# Patient Record
Sex: FEMALE | URBAN - METROPOLITAN AREA
[De-identification: names, ages, dates, MRNs, and addresses within clinical notes are randomized per-mention and may not be internally consistent; named-entity substitution may affect disease eponyms.]

---

## 2017-01-09 ENCOUNTER — HISTORICAL (OUTPATIENT)
Dept: ENDOSCOPY | Facility: HOSPITAL | Age: 62
End: 2017-01-09

## 2018-08-03 ENCOUNTER — HISTORICAL (OUTPATIENT)
Dept: CARDIOLOGY | Facility: HOSPITAL | Age: 63
End: 2018-08-03

## 2024-05-05 ENCOUNTER — HOSPITAL ENCOUNTER (INPATIENT)
Facility: HOSPITAL | Age: 69
LOS: 9 days | Discharge: HOME HEALTH | DRG: 236 | End: 2024-05-14
Attending: EMERGENCY MEDICINE | Admitting: INTERNAL MEDICINE
Payer: MEDICARE

## 2024-05-05 DIAGNOSIS — I25.10 ATHEROSCLEROSIS OF NATIVE CORONARY ARTERY OF NATIVE HEART WITHOUT ANGINA PECTORIS: Primary | ICD-10-CM

## 2024-05-05 DIAGNOSIS — I25.10 CAD (CORONARY ARTERY DISEASE): ICD-10-CM

## 2024-05-05 DIAGNOSIS — I20.0 UNSTABLE ANGINA: ICD-10-CM

## 2024-05-05 DIAGNOSIS — R07.9 CHEST PAIN: ICD-10-CM

## 2024-05-05 LAB
ALBUMIN SERPL-MCNC: 3.9 G/DL (ref 3.4–4.8)
ALBUMIN/GLOB SERPL: 1.3 RATIO (ref 1.1–2)
ALP SERPL-CCNC: 89 UNIT/L (ref 40–150)
ALT SERPL-CCNC: 22 UNIT/L (ref 0–55)
AST SERPL-CCNC: 28 UNIT/L (ref 5–34)
BASOPHILS # BLD AUTO: 0.03 X10(3)/MCL
BASOPHILS NFR BLD AUTO: 0.8 %
BILIRUB SERPL-MCNC: 0.7 MG/DL
BNP BLD-MCNC: 12.7 PG/ML
BUN SERPL-MCNC: 10.3 MG/DL (ref 9.8–20.1)
CALCIUM SERPL-MCNC: 10.1 MG/DL (ref 8.4–10.2)
CHLORIDE SERPL-SCNC: 110 MMOL/L (ref 98–107)
CHOLEST SERPL-MCNC: 120 MG/DL
CHOLEST/HDLC SERPL: 2 {RATIO} (ref 0–5)
CO2 SERPL-SCNC: 23 MMOL/L (ref 23–31)
CREAT SERPL-MCNC: 0.78 MG/DL (ref 0.55–1.02)
EOSINOPHIL # BLD AUTO: 0.07 X10(3)/MCL (ref 0–0.9)
EOSINOPHIL NFR BLD AUTO: 1.8 %
ERYTHROCYTE [DISTWIDTH] IN BLOOD BY AUTOMATED COUNT: 13 % (ref 11.5–17)
GFR SERPLBLD CREATININE-BSD FMLA CKD-EPI: >60 MLS/MIN/1.73/M2
GLOBULIN SER-MCNC: 3 GM/DL (ref 2.4–3.5)
GLUCOSE SERPL-MCNC: 93 MG/DL (ref 82–115)
HCT VFR BLD AUTO: 39.9 % (ref 37–47)
HDLC SERPL-MCNC: 49 MG/DL (ref 35–60)
HGB BLD-MCNC: 13.4 G/DL (ref 12–16)
IMM GRANULOCYTES # BLD AUTO: 0.01 X10(3)/MCL (ref 0–0.04)
IMM GRANULOCYTES NFR BLD AUTO: 0.3 %
LDLC SERPL CALC-MCNC: 60 MG/DL (ref 50–140)
LYMPHOCYTES # BLD AUTO: 1.47 X10(3)/MCL (ref 0.6–4.6)
LYMPHOCYTES NFR BLD AUTO: 37.1 %
MCH RBC QN AUTO: 29.8 PG (ref 27–31)
MCHC RBC AUTO-ENTMCNC: 33.6 G/DL (ref 33–36)
MCV RBC AUTO: 88.9 FL (ref 80–94)
MONOCYTES # BLD AUTO: 0.38 X10(3)/MCL (ref 0.1–1.3)
MONOCYTES NFR BLD AUTO: 9.6 %
NEUTROPHILS # BLD AUTO: 2 X10(3)/MCL (ref 2.1–9.2)
NEUTROPHILS NFR BLD AUTO: 50.4 %
NRBC BLD AUTO-RTO: 0 %
PLATELET # BLD AUTO: 165 X10(3)/MCL (ref 130–400)
PMV BLD AUTO: 10 FL (ref 7.4–10.4)
POTASSIUM SERPL-SCNC: 4.1 MMOL/L (ref 3.5–5.1)
PROT SERPL-MCNC: 6.9 GM/DL (ref 5.8–7.6)
RBC # BLD AUTO: 4.49 X10(6)/MCL (ref 4.2–5.4)
SODIUM SERPL-SCNC: 145 MMOL/L (ref 136–145)
TRIGL SERPL-MCNC: 54 MG/DL (ref 37–140)
TROPONIN I SERPL-MCNC: <0.01 NG/ML (ref 0–0.04)
TROPONIN I SERPL-MCNC: <0.01 NG/ML (ref 0–0.04)
VLDLC SERPL CALC-MCNC: 11 MG/DL
WBC # SPEC AUTO: 3.96 X10(3)/MCL (ref 4.5–11.5)

## 2024-05-05 PROCEDURE — P9045 ALBUMIN (HUMAN), 5%, 250 ML: HCPCS | Mod: JG

## 2024-05-05 PROCEDURE — 99285 EMERGENCY DEPT VISIT HI MDM: CPT | Mod: 25

## 2024-05-05 PROCEDURE — 11000001 HC ACUTE MED/SURG PRIVATE ROOM

## 2024-05-05 PROCEDURE — 21400001 HC TELEMETRY ROOM

## 2024-05-05 PROCEDURE — 80061 LIPID PANEL: CPT | Performed by: EMERGENCY MEDICINE

## 2024-05-05 PROCEDURE — 93010 ELECTROCARDIOGRAM REPORT: CPT | Mod: ,,, | Performed by: INTERNAL MEDICINE

## 2024-05-05 PROCEDURE — 25000003 PHARM REV CODE 250

## 2024-05-05 PROCEDURE — 85025 COMPLETE CBC W/AUTO DIFF WBC: CPT | Performed by: NURSE PRACTITIONER

## 2024-05-05 PROCEDURE — 80053 COMPREHEN METABOLIC PANEL: CPT | Performed by: NURSE PRACTITIONER

## 2024-05-05 PROCEDURE — 84484 ASSAY OF TROPONIN QUANT: CPT | Performed by: NURSE PRACTITIONER

## 2024-05-05 PROCEDURE — 93005 ELECTROCARDIOGRAM TRACING: CPT

## 2024-05-05 PROCEDURE — 84484 ASSAY OF TROPONIN QUANT: CPT | Performed by: EMERGENCY MEDICINE

## 2024-05-05 PROCEDURE — 63600175 PHARM REV CODE 636 W HCPCS: Mod: JG

## 2024-05-05 PROCEDURE — P9047 ALBUMIN (HUMAN), 25%, 50ML: HCPCS | Mod: JG

## 2024-05-05 PROCEDURE — 83880 ASSAY OF NATRIURETIC PEPTIDE: CPT | Performed by: NURSE PRACTITIONER

## 2024-05-05 RX ORDER — CLOPIDOGREL BISULFATE 75 MG/1
75 TABLET ORAL DAILY
COMMUNITY
Start: 2024-04-30

## 2024-05-05 RX ORDER — ASPIRIN 325 MG
TABLET ORAL
Status: COMPLETED
Start: 2024-05-05 | End: 2024-05-05

## 2024-05-05 RX ORDER — ROSUVASTATIN CALCIUM 40 MG/1
40 TABLET, COATED ORAL DAILY
COMMUNITY
Start: 2024-03-12

## 2024-05-05 RX ORDER — ASPIRIN 325 MG
325 TABLET, DELAYED RELEASE (ENTERIC COATED) ORAL ONCE
Status: DISCONTINUED | OUTPATIENT
Start: 2024-05-05 | End: 2024-05-10

## 2024-05-05 RX ORDER — ASPIRIN 325 MG
325 TABLET ORAL DAILY
Status: DISCONTINUED | OUTPATIENT
Start: 2024-05-05 | End: 2024-05-09

## 2024-05-05 RX ORDER — LISINOPRIL 10 MG/1
10 TABLET ORAL DAILY
Status: ON HOLD | COMMUNITY
Start: 2024-03-06 | End: 2024-05-14 | Stop reason: HOSPADM

## 2024-05-05 RX ORDER — NITROGLYCERIN 0.4 MG/1
0.4 TABLET SUBLINGUAL EVERY 5 MIN PRN
Status: DISCONTINUED | OUTPATIENT
Start: 2024-05-05 | End: 2024-05-14 | Stop reason: HOSPADM

## 2024-05-05 RX ORDER — SODIUM CHLORIDE 0.9 % (FLUSH) 0.9 %
10 SYRINGE (ML) INJECTION
Status: DISCONTINUED | OUTPATIENT
Start: 2024-05-05 | End: 2024-05-14 | Stop reason: HOSPADM

## 2024-05-05 RX ADMIN — ASPIRIN 325 MG ORAL TABLET 325 MG: 325 PILL ORAL at 01:05

## 2024-05-05 NOTE — ED PROVIDER NOTES
Encounter Date: 5/5/2024    SCRIBE #1 NOTE: I, Mio Holland, am scribing for, and in the presence of,  Dr. Davis. I have scribed the following portions of the note - the EKG reading. Other sections scribed: HPI, ROS, Physical Exam, MDM, Attending.       History     Chief Complaint   Patient presents with    Pleurisy     Pt. C/o left sided chest pain worse with standing and unable to stand up for long periods of time.. reports had echo done last week with abnormal results. Reports occasional sob      69 y/o female with history of HTN and HLD presents to ED for dull L-sided chest pain worse with exertion onset 5-7 days ago.  Pt states she had ECHO done with Dr. Doe last week and was told to return to his office after results came back abnormal.  She states she has not spoken with his office since then, but her symptoms continued, so she came here.  She also notes some SOB and fatigue with exertion and says she has to lie down after standing up for short periods of time.  She states the pain occasionally radiates down her L arm as well.  She had angiogram a few years ago and was told that she had 50% blockage.  Pt does not use EtOH or tobacco.  She denies rhinorrhea or congestion.    The history is provided by the patient.   Chest Pain  The current episode started several days ago. Chest pain occurs intermittently. The chest pain is currently at 0/10. The quality of the pain is described as dull. The pain radiates to the left arm. Chest pain is worsened by exertion. Primary symptoms include fatigue and shortness of breath.   Her past medical history is significant for hyperlipidemia and hypertension.   Procedure history is positive for echocardiogram.     Review of patient's allergies indicates:  No Known Allergies  No past medical history on file.  No past surgical history on file.  No family history on file.     Review of Systems   Constitutional:  Positive for fatigue.   HENT:  Negative for congestion and  rhinorrhea.    Respiratory:  Positive for shortness of breath.    Cardiovascular:  Positive for chest pain.       Physical Exam     Initial Vitals [05/05/24 1139]   BP Pulse Resp Temp SpO2   (!) 150/84 66 20 98.9 °F (37.2 °C) 100 %      MAP       --         Physical Exam    Nursing note and vitals reviewed.  Constitutional: No distress.   HENT:   Head: Normocephalic and atraumatic.   Neck: Trachea normal.   Cardiovascular:  Normal rate and regular rhythm.           No murmur heard.  Pulmonary/Chest: Breath sounds normal. No respiratory distress.   Abdominal: Abdomen is soft. Bowel sounds are normal. She exhibits no distension. There is no abdominal tenderness.   Musculoskeletal:         General: Normal range of motion.      Lumbar back: Normal range of motion.     Neurological: She is alert and oriented to person, place, and time. She has normal strength. No cranial nerve deficit.   Skin: Skin is warm and dry. No rash noted.   Psychiatric: She has a normal mood and affect. Judgment normal.         ED Course   Procedures  Labs Reviewed   COMPREHENSIVE METABOLIC PANEL - Abnormal; Notable for the following components:       Result Value    Chloride 110 (*)     All other components within normal limits   CBC WITH DIFFERENTIAL - Abnormal; Notable for the following components:    WBC 3.96 (*)     Neut # 2.00 (*)     All other components within normal limits   TROPONIN I - Normal   B-TYPE NATRIURETIC PEPTIDE - Normal   CBC W/ AUTO DIFFERENTIAL    Narrative:     The following orders were created for panel order CBC Auto Differential.  Procedure                               Abnormality         Status                     ---------                               -----------         ------                     CBC with Differential[[id number]]       Abnormal            Final result                 Please view results for these tests on the individual orders.   LIPID PANEL   TROPONIN I     EKG Readings: (Independently  Interpreted)   Initial Reading: No STEMI. Rhythm: Normal Sinus Rhythm. Heart Rate: 63. Ectopy: No Ectopy. Conduction: Normal. ST Segments: Normal ST Segments. T Waves: Normal. Axis: Normal.   1134       Imaging Results              X-Ray Chest PA And Lateral (In process)                      Medications   aspirin 325 MG tablet (has no administration in time range)   aspirin EC tablet 325 mg (has no administration in time range)   sodium chloride 0.9% flush 10 mL (has no administration in time range)   aspirin tablet 325 mg (has no administration in time range)   nitroGLYCERIN SL tablet 0.4 mg (has no administration in time range)     Medical Decision Making  Differential diagnoses include, but are not limited to: acute coronary syndrome, unstable angina    EKG without abnormality enzymes normal discussed case with Brandon with [facility] patient had a PET scan on Thursday which was abnormal instructing to admit and trend out    Problems Addressed:  Chest pain: complicated acute illness or injury    Amount and/or Complexity of Data Reviewed  Labs: ordered.  Radiology: ordered.  ECG/medicine tests: ordered.    Risk  OTC drugs.  Prescription drug management.  Decision regarding hospitalization.            Scribe Attestation:   Scribe #1: I performed the above scribed service and the documentation accurately describes the services I performed. I attest to the accuracy of the note.    Attending Attestation:           Physician Attestation for Scribe:  Physician Attestation Statement for Scribe #1: I, reviewed documentation, as scribed by Mio Holland in my presence, and it is both accurate and complete.             ED Course as of 05/05/24 1244   Sun May 05, 2024   1205 Paged [facility] [CY]      ED Course User Index  [CY] Mio Holland                           Clinical Impression:  Final diagnoses:  [R07.9] Chest pain  [I20.0] Unstable angina (Primary)          ED Disposition Condition    Admit Stable                Aubrey Davis  III, MD  05/05/24 1242

## 2024-05-05 NOTE — FIRST PROVIDER EVALUATION
Medical screening examination initiated.  I have conducted a focused provider triage encounter, findings are as follows:    Brief history of present illness:  67y/o F presents to the clinic with chest pain.  Recent echo done on Thursday.  Reports SOB    There were no vitals filed for this visit.    Pertinent physical exam:  AAA x 3    Brief workup plan:  Labs/EKG     Preliminary workup initiated; this workup will be continued and followed by the physician or advanced practice provider that is assigned to the patient when roomed.

## 2024-05-06 LAB
OHS QRS DURATION: 70 MS
OHS QTC CALCULATION: 360 MS

## 2024-05-06 PROCEDURE — 25500020 PHARM REV CODE 255: Performed by: STUDENT IN AN ORGANIZED HEALTH CARE EDUCATION/TRAINING PROGRAM

## 2024-05-06 PROCEDURE — 25000003 PHARM REV CODE 250: Performed by: NURSE PRACTITIONER

## 2024-05-06 PROCEDURE — 4A023N7 MEASUREMENT OF CARDIAC SAMPLING AND PRESSURE, LEFT HEART, PERCUTANEOUS APPROACH: ICD-10-PCS | Performed by: STUDENT IN AN ORGANIZED HEALTH CARE EDUCATION/TRAINING PROGRAM

## 2024-05-06 PROCEDURE — 25000003 PHARM REV CODE 250: Performed by: STUDENT IN AN ORGANIZED HEALTH CARE EDUCATION/TRAINING PROGRAM

## 2024-05-06 PROCEDURE — C1887 CATHETER, GUIDING: HCPCS | Performed by: STUDENT IN AN ORGANIZED HEALTH CARE EDUCATION/TRAINING PROGRAM

## 2024-05-06 PROCEDURE — B2111ZZ FLUOROSCOPY OF MULTIPLE CORONARY ARTERIES USING LOW OSMOLAR CONTRAST: ICD-10-PCS | Performed by: STUDENT IN AN ORGANIZED HEALTH CARE EDUCATION/TRAINING PROGRAM

## 2024-05-06 PROCEDURE — 93458 L HRT ARTERY/VENTRICLE ANGIO: CPT | Performed by: STUDENT IN AN ORGANIZED HEALTH CARE EDUCATION/TRAINING PROGRAM

## 2024-05-06 PROCEDURE — 99152 MOD SED SAME PHYS/QHP 5/>YRS: CPT | Performed by: STUDENT IN AN ORGANIZED HEALTH CARE EDUCATION/TRAINING PROGRAM

## 2024-05-06 PROCEDURE — 99153 MOD SED SAME PHYS/QHP EA: CPT | Performed by: STUDENT IN AN ORGANIZED HEALTH CARE EDUCATION/TRAINING PROGRAM

## 2024-05-06 PROCEDURE — 21400001 HC TELEMETRY ROOM

## 2024-05-06 PROCEDURE — 27201423 OPTIME MED/SURG SUP & DEVICES STERILE SUPPLY: Performed by: STUDENT IN AN ORGANIZED HEALTH CARE EDUCATION/TRAINING PROGRAM

## 2024-05-06 PROCEDURE — C1894 INTRO/SHEATH, NON-LASER: HCPCS | Performed by: STUDENT IN AN ORGANIZED HEALTH CARE EDUCATION/TRAINING PROGRAM

## 2024-05-06 PROCEDURE — 93799 UNLISTED CV SVC/PROCEDURE: CPT | Performed by: STUDENT IN AN ORGANIZED HEALTH CARE EDUCATION/TRAINING PROGRAM

## 2024-05-06 PROCEDURE — 27000221 HC OXYGEN, UP TO 24 HOURS

## 2024-05-06 PROCEDURE — 63600175 PHARM REV CODE 636 W HCPCS: Performed by: STUDENT IN AN ORGANIZED HEALTH CARE EDUCATION/TRAINING PROGRAM

## 2024-05-06 PROCEDURE — 25000003 PHARM REV CODE 250: Performed by: EMERGENCY MEDICINE

## 2024-05-06 PROCEDURE — C1769 GUIDE WIRE: HCPCS | Performed by: STUDENT IN AN ORGANIZED HEALTH CARE EDUCATION/TRAINING PROGRAM

## 2024-05-06 RX ORDER — CLOPIDOGREL BISULFATE 75 MG/1
75 TABLET ORAL DAILY
Status: DISCONTINUED | OUTPATIENT
Start: 2024-05-06 | End: 2024-05-06

## 2024-05-06 RX ORDER — FENTANYL CITRATE 50 UG/ML
INJECTION, SOLUTION INTRAMUSCULAR; INTRAVENOUS
Status: DISCONTINUED | OUTPATIENT
Start: 2024-05-06 | End: 2024-05-06 | Stop reason: HOSPADM

## 2024-05-06 RX ORDER — MIDAZOLAM HYDROCHLORIDE 1 MG/ML
INJECTION INTRAMUSCULAR; INTRAVENOUS
Status: DISCONTINUED | OUTPATIENT
Start: 2024-05-06 | End: 2024-05-06 | Stop reason: HOSPADM

## 2024-05-06 RX ORDER — SODIUM CHLORIDE 0.9 % (FLUSH) 0.9 %
10 SYRINGE (ML) INJECTION
Status: DISCONTINUED | OUTPATIENT
Start: 2024-05-06 | End: 2024-05-14 | Stop reason: HOSPADM

## 2024-05-06 RX ORDER — LIDOCAINE HYDROCHLORIDE 10 MG/ML
INJECTION INFILTRATION; PERINEURAL
Status: DISCONTINUED | OUTPATIENT
Start: 2024-05-06 | End: 2024-05-06 | Stop reason: HOSPADM

## 2024-05-06 RX ORDER — VERAPAMIL HYDROCHLORIDE 2.5 MG/ML
INJECTION, SOLUTION INTRAVENOUS
Status: DISCONTINUED | OUTPATIENT
Start: 2024-05-06 | End: 2024-05-06 | Stop reason: HOSPADM

## 2024-05-06 RX ORDER — HEPARIN SODIUM 1000 [USP'U]/ML
INJECTION, SOLUTION INTRAVENOUS; SUBCUTANEOUS
Status: DISCONTINUED | OUTPATIENT
Start: 2024-05-06 | End: 2024-05-06 | Stop reason: HOSPADM

## 2024-05-06 RX ORDER — CLOPIDOGREL BISULFATE 300 MG/1
600 TABLET, FILM COATED ORAL ONCE
Status: COMPLETED | OUTPATIENT
Start: 2024-05-06 | End: 2024-05-06

## 2024-05-06 RX ORDER — HYDROCODONE BITARTRATE AND ACETAMINOPHEN 5; 325 MG/1; MG/1
1 TABLET ORAL EVERY 4 HOURS PRN
Status: DISCONTINUED | OUTPATIENT
Start: 2024-05-06 | End: 2024-05-14 | Stop reason: HOSPADM

## 2024-05-06 RX ORDER — MORPHINE SULFATE 4 MG/ML
2 INJECTION, SOLUTION INTRAMUSCULAR; INTRAVENOUS EVERY 4 HOURS PRN
Status: DISCONTINUED | OUTPATIENT
Start: 2024-05-06 | End: 2024-05-14 | Stop reason: HOSPADM

## 2024-05-06 RX ORDER — ONDANSETRON HYDROCHLORIDE 2 MG/ML
4 INJECTION, SOLUTION INTRAVENOUS EVERY 8 HOURS PRN
Status: DISCONTINUED | OUTPATIENT
Start: 2024-05-06 | End: 2024-05-14 | Stop reason: HOSPADM

## 2024-05-06 RX ORDER — ACETAMINOPHEN 325 MG/1
650 TABLET ORAL EVERY 4 HOURS PRN
Status: DISCONTINUED | OUTPATIENT
Start: 2024-05-06 | End: 2024-05-14 | Stop reason: HOSPADM

## 2024-05-06 RX ORDER — HYDRALAZINE HYDROCHLORIDE 20 MG/ML
10 INJECTION INTRAMUSCULAR; INTRAVENOUS EVERY 4 HOURS PRN
Status: DISCONTINUED | OUTPATIENT
Start: 2024-05-06 | End: 2024-05-14 | Stop reason: HOSPADM

## 2024-05-06 RX ORDER — NITROGLYCERIN 20 MG/100ML
INJECTION INTRAVENOUS
Status: DISCONTINUED | OUTPATIENT
Start: 2024-05-06 | End: 2024-05-06 | Stop reason: HOSPADM

## 2024-05-06 RX ORDER — ATORVASTATIN CALCIUM 40 MG/1
80 TABLET, FILM COATED ORAL NIGHTLY
Status: DISCONTINUED | OUTPATIENT
Start: 2024-05-06 | End: 2024-05-14 | Stop reason: HOSPADM

## 2024-05-06 RX ADMIN — ATORVASTATIN CALCIUM 80 MG: 40 TABLET, FILM COATED ORAL at 08:05

## 2024-05-06 RX ADMIN — ASPIRIN 325 MG ORAL TABLET 325 MG: 325 PILL ORAL at 08:05

## 2024-05-06 RX ADMIN — HYDROCODONE BITARTRATE AND ACETAMINOPHEN 1 TABLET: 5; 325 TABLET ORAL at 06:05

## 2024-05-06 RX ADMIN — CLOPIDOGREL BISULFATE 600 MG: 300 TABLET, FILM COATED ORAL at 11:05

## 2024-05-06 NOTE — NURSING
Nurses Note -- 4 Eyes      5/5/2024   10:33 PM      Skin assessed during: Admit      [x] No Altered Skin Integrity Present    []Prevention Measures Documented      [] Yes- Altered Skin Integrity Present or Discovered   [] LDA Added if Not in Epic (Describe Wound)   [] New Altered Skin Integrity was Present on Admit and Documented in LDA   [] Wound Image Taken    Wound Care Consulted? No    Attending Nurse:  Audrey Vital RN/Staff Member:   Rosa Carlos RN

## 2024-05-06 NOTE — H&P
[facility] - 6th Floor Medical Telemetry    Cardiology  History and Physical     Patient Name: Tanvi Miranda  MRN: [id number]  Admission Date: 5/5/2024  Code Status: Full Code   Attending Provider: Quentin Peterson MD   Primary Care Physician: No, Primary Doctor  Principal Problem:<principal problem not specified>    Patient information was obtained from patient and ER records.     Subjective:     Chief Complaint: Chest Pain    HPI: 68-year-old female that is known to Dr. Doe with a PMHx of HLD, HTN, GERD and family Hx of early CAD. She presented to [facility] ED on 5.5.24 with complaints of left sided chest pain. Patient recently had a VAN/PET and ECHO completed by Dr. Doe and was planned to follow up 5.9.24 for results. She reports associated SOB and fatugue with exertion. She also report radiating left arm pain. Her last [facility] was back in 2018. ED Course: HR 66, /84, RR 20, Temp 98.9F SpO2 100% on RA. Lab work showed: WBC 3.98, K 4.1, BUN/Cr 10.3/0.78, Ca 10.1, AST/ALT 28/23, BNP 12.7, Trop: <0.010 X2, EKG shows no acute ST changes. [facility] will admit for chest pain.       PMH: HLD, HTN, GERD and family Hx of early CAD  PSH: Colonoscopy, tonsillectomy, Hysterectomy   Family History: Mother: CAD s/p PCI, Alzheimers, PAD, Brother & Sister: CAD  Social History: Denies Tobacco, illicit drug, or ETOH use.     Previous Cardiac Diagnostics:   Van PET (5.2.24):  The left ventricular cavity is noted to be normal on the stress studies. The stress left ventricular ejection fraction was calculated to be 63% and left ventricular global function is normal. The rest left ventricular cavity is noted to be normal. The rest left ventricular ejection fraction was calculated to be 50% and rest left ventricular global function is normal.   When compared to the resting ejection fraction (50%), the stress ejection fraction (63%) has increased.   There was a rise in myocardial blood flow between rest and stress.  Global  myocardial blood flow reserve was 2.88.  Myocardial blood flow reserve is reduced in the inferior territory which is suggestive of flow limiting stenosis in this territory.  *FINAL READ NOT DONE*    ECHO (5.2.24):  The study quality is average.   The left ventricle is normal in size. Global left ventricular systolic function is normal. The left ventricular ejection fraction is 60%.   Normal appearing valvular structures and function are within study limits.   The pulmonary artery systolic pressure is 10 mmHg. The pulmonary artery appears to be normal.    LHC ( 8.3.18):  LAD: arrises right from the aorta there is no LM. Significant calcification present in LAD. There is a 40-50% stenosis in the midpoint of the LAD. D2 is moderate in size with 20-30% stenosis   RCA: large prominent vessel. It is dominant. 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, Shows a long tubular  50% stenosis proximally with heavy calcification,  No obstructive disease was found   PLAN: Medical management       Review of patient's allergies indicates:  No Known Allergies    Current Facility-Administered Medications   Medication Dose Route Frequency Provider Last Rate Last Admin    aspirin EC tablet 325 mg  325 mg Oral Once Aubrey Davis III, MD        aspirin tablet 325 mg  325 mg Oral Daily Aubrey Davis III, MD   325 mg at 05/06/24 0812    nitroGLYCERIN SL tablet 0.4 mg  0.4 mg Sublingual Q5 Min PRN Aubrey Davis III, MD        sodium chloride 0.9% flush 10 mL  10 mL Intravenous PRN Aubrey Davis III, MD         Family History    None       Tobacco Use    Smoking status: Never    Smokeless tobacco: Never   Substance and Sexual Activity    Alcohol use: Not on file    Drug use: Not on file    Sexual activity: Not on file     Review of Systems   Constitutional: Negative.   HENT: Negative.     Eyes:  Negative.    Cardiovascular:  Positive for chest pain. Negative for palpitations and syncope.   Respiratory:  Negative for cough and shortness of breath.    Skin: Negative.    Musculoskeletal: Negative.    Gastrointestinal:  Negative for abdominal pain, nausea and vomiting.   Genitourinary: Negative.    Neurological: Negative.      Objective:     Vital Signs (Most Recent):  Temp: 98 °F (36.7 °C) (05/06/24 0450)  Pulse: 64 (05/06/24 0450)  Resp: 17 (05/06/24 0450)  BP: 138/79 (05/06/24 0450)  SpO2: 99 % (05/06/24 0450) Vital Signs (24h Range):  Temp:  [97.9 °F (36.6 °C)-98.9 °F (37.2 °C)] 98 °F (36.7 °C)  Pulse:  [53-73] 64  Resp:  [15-25] 17  SpO2:  [98 %-100 %] 99 %  BP: (124-158)/(75-91) 138/79     Weight: 78.7 kg (173 lb 8 oz)  Body mass index is 30.73 kg/m².    SpO2: 99 %       No intake or output data in the 24 hours ending 05/06/24 0844    Lines/Drains/Airways       Peripheral Intravenous Line  Duration                  Peripheral IV - Single Lumen 05/05/24 1201 20 G Anterior;Proximal;Right Forearm <1 day                    Physical Exam  Constitutional:       General: She is not in acute distress.     Appearance: Normal appearance.   HENT:      Head: Normocephalic.      Nose: Nose normal.      Mouth/Throat:      Mouth: Mucous membranes are moist.   Eyes:      Pupils: Pupils are equal, round, and reactive to light.   Cardiovascular:      Rate and Rhythm: Normal rate and regular rhythm.      Pulses: Normal pulses.      Heart sounds: Normal heart sounds. No murmur heard.  Pulmonary:      Effort: Pulmonary effort is normal. No respiratory distress.      Breath sounds: Normal breath sounds.   Abdominal:      General: Abdomen is flat. There is no distension.      Palpations: Abdomen is soft.   Skin:     General: Skin is warm.   Neurological:      General: No focal deficit present.      Mental Status: She is alert and oriented to person, place, and time.   Psychiatric:         Mood and Affect: Mood normal.          Behavior: Behavior normal.         Judgment: Judgment normal.         EKG:       Telemetry: SR    Significant Labs: BMP:   Recent Labs   Lab 05/05/24  1156      K 4.1   CO2 23   BUN 10.3   CREATININE 0.78   CALCIUM 10.1   , CMP   Recent Labs   Lab 05/05/24  1156      K 4.1   CO2 23   BUN 10.3   CREATININE 0.78   CALCIUM 10.1   ALBUMIN 3.9   BILITOT 0.7   ALKPHOS 89   AST 28   ALT 22   , CBC   Recent Labs   Lab 05/05/24  1156   WBC 3.96*   HGB 13.4   HCT 39.9      , Lipid Panel   Recent Labs   Lab 05/05/24  1256   CHOL 120   HDL 49   TRIG 54   , and Troponin   Recent Labs   Lab 05/05/24  1156 05/05/24  1256   TROPONINI <0.010 <0.010       Significant Imaging:  Results for orders placed or performed during the hospital encounter of 05/05/24   X-Ray Chest PA And Lateral    Narrative    EXAMINATION:  XR CHEST PA AND LATERAL    CLINICAL HISTORY:  Chest pain, unspecified    TECHNIQUE:  Two views of the chest    COMPARISON:  05/02/2013    FINDINGS:  LINES AND TUBES: EKG/telemetry leads overlie the chest.    MEDIASTINUM AND CUBA: The cardiac silhouette is normal.    LUNGS: No lobar consolidation. No edema.    PLEURA:No pleural effusion. No pneumothorax.    BONES: No acute osseous abnormality.      Impression    No acute cardiopulmonary abnormality.      Electronically signed by: Julia Schaefer  Date:    05/05/2024  Time:    12:59     Assessment and Plan:   Unstable Angina    - Trop: <0.010 X2   - EKG: shows no acute ST changes  CAD   -  PET (5.2.24): concern for Anterior ischemia ~ Dr. Zapata reviewed   HLD  HTN  GERD  No Hx of GI bleed  No known allergies       PLAN:  EKG reviewed  Recent ECHO reviewed   VAN/PET ~ reviewed by Dr. Zapata  Loaded with ASA  Load was Plavix 600mg    BB noted started due to bradycardia   Restart Home Plavix & Statin  Plan for [facility] today   Keep NPO until after procedure   Risk and benefits discussed and informed consent was obtained ~ placed on chart  AM Labs: CBC,  CMP, Mag        VTE Risk Mitigation (From admission, onward)           Ordered     IP VTE HIGH RISK PATIENT  Once         05/05/24 1244     Place sequential compression device  Until discontinued         05/05/24 1244                    Danuta Cheek NP  Cardiology  [facility] - 6th Floor Medical Telemetry  05/06/2024 8:44 AM    Physician addendum:  I have seen and examined this patient as a split-shared visit with the EMILY d/t complicated medical management of above problems written in assessment and high acuity requiring physician expertise in medical decision-making. I performed the substantive portion of the history and exam. Above medical decision-making is also formulated by me.  Stress test reviewed appears to have significant inferior ischemia    Cardiovascular exam:  S1, S2  Lungs:  Clear to auscultation  Extremities:  No edema, 2 + pulses    Plan:  Risks, benefits, alternatives of left heart catheterization plus or minus intervention discussed in detail with patient.  Specific risks include but are not limited to stroke, death, heart attack, need for emergent surgery, bleeding, renal failure.  Patient voiced understanding and wishes to proceed.  Patient on aspirin  Will load with plavix, suspect single vessel disease in RCA territory by recent stress test results    Daniel Zapata MD  Cardiologist

## 2024-05-07 LAB
ALBUMIN SERPL-MCNC: 3.7 G/DL (ref 3.4–4.8)
ALBUMIN/GLOB SERPL: 1.5 RATIO (ref 1.1–2)
ALP SERPL-CCNC: 82 UNIT/L (ref 40–150)
ALT SERPL-CCNC: 17 UNIT/L (ref 0–55)
AST SERPL-CCNC: 21 UNIT/L (ref 5–34)
BASOPHILS # BLD AUTO: 0.05 X10(3)/MCL
BASOPHILS NFR BLD AUTO: 1.1 %
BILIRUB SERPL-MCNC: 0.9 MG/DL
BUN SERPL-MCNC: 11.8 MG/DL (ref 9.8–20.1)
CALCIUM SERPL-MCNC: 10 MG/DL (ref 8.4–10.2)
CHLORIDE SERPL-SCNC: 109 MMOL/L (ref 98–107)
CO2 SERPL-SCNC: 26 MMOL/L (ref 23–31)
CREAT SERPL-MCNC: 0.78 MG/DL (ref 0.55–1.02)
EOSINOPHIL # BLD AUTO: 0.14 X10(3)/MCL (ref 0–0.9)
EOSINOPHIL NFR BLD AUTO: 3.1 %
ERYTHROCYTE [DISTWIDTH] IN BLOOD BY AUTOMATED COUNT: 13.1 % (ref 11.5–17)
GFR SERPLBLD CREATININE-BSD FMLA CKD-EPI: >60 MLS/MIN/1.73/M2
GLOBULIN SER-MCNC: 2.4 GM/DL (ref 2.4–3.5)
GLUCOSE SERPL-MCNC: 99 MG/DL (ref 82–115)
HCT VFR BLD AUTO: 42 % (ref 37–47)
HGB BLD-MCNC: 13.7 G/DL (ref 12–16)
IMM GRANULOCYTES # BLD AUTO: 0.01 X10(3)/MCL (ref 0–0.04)
IMM GRANULOCYTES NFR BLD AUTO: 0.2 %
LYMPHOCYTES # BLD AUTO: 1.59 X10(3)/MCL (ref 0.6–4.6)
LYMPHOCYTES NFR BLD AUTO: 35 %
MAGNESIUM SERPL-MCNC: 2.1 MG/DL (ref 1.6–2.6)
MCH RBC QN AUTO: 29.9 PG (ref 27–31)
MCHC RBC AUTO-ENTMCNC: 32.6 G/DL (ref 33–36)
MCV RBC AUTO: 91.7 FL (ref 80–94)
MONOCYTES # BLD AUTO: 0.44 X10(3)/MCL (ref 0.1–1.3)
MONOCYTES NFR BLD AUTO: 9.7 %
NEUTROPHILS # BLD AUTO: 2.31 X10(3)/MCL (ref 2.1–9.2)
NEUTROPHILS NFR BLD AUTO: 50.9 %
NRBC BLD AUTO-RTO: 0 %
PLATELET # BLD AUTO: 161 X10(3)/MCL (ref 130–400)
PMV BLD AUTO: 10.1 FL (ref 7.4–10.4)
POTASSIUM SERPL-SCNC: 3.9 MMOL/L (ref 3.5–5.1)
PROT SERPL-MCNC: 6.1 GM/DL (ref 5.8–7.6)
RBC # BLD AUTO: 4.58 X10(6)/MCL (ref 4.2–5.4)
SODIUM SERPL-SCNC: 140 MMOL/L (ref 136–145)
WBC # SPEC AUTO: 4.54 X10(3)/MCL (ref 4.5–11.5)

## 2024-05-07 PROCEDURE — 85025 COMPLETE CBC W/AUTO DIFF WBC: CPT | Performed by: NURSE PRACTITIONER

## 2024-05-07 PROCEDURE — 25000003 PHARM REV CODE 250: Performed by: STUDENT IN AN ORGANIZED HEALTH CARE EDUCATION/TRAINING PROGRAM

## 2024-05-07 PROCEDURE — 25000003 PHARM REV CODE 250: Performed by: EMERGENCY MEDICINE

## 2024-05-07 PROCEDURE — 99223 1ST HOSP IP/OBS HIGH 75: CPT | Mod: ,,,

## 2024-05-07 PROCEDURE — 83735 ASSAY OF MAGNESIUM: CPT | Performed by: NURSE PRACTITIONER

## 2024-05-07 PROCEDURE — 21400001 HC TELEMETRY ROOM

## 2024-05-07 PROCEDURE — 80053 COMPREHEN METABOLIC PANEL: CPT | Performed by: NURSE PRACTITIONER

## 2024-05-07 PROCEDURE — 25000003 PHARM REV CODE 250: Performed by: NURSE PRACTITIONER

## 2024-05-07 PROCEDURE — 36415 COLL VENOUS BLD VENIPUNCTURE: CPT | Performed by: NURSE PRACTITIONER

## 2024-05-07 RX ADMIN — ASPIRIN 325 MG ORAL TABLET 325 MG: 325 PILL ORAL at 09:05

## 2024-05-07 RX ADMIN — HYDROCODONE BITARTRATE AND ACETAMINOPHEN 1 TABLET: 5; 325 TABLET ORAL at 08:05

## 2024-05-07 RX ADMIN — ATORVASTATIN CALCIUM 80 MG: 40 TABLET, FILM COATED ORAL at 07:05

## 2024-05-07 RX ADMIN — ACETAMINOPHEN 650 MG: 325 TABLET, FILM COATED ORAL at 12:05

## 2024-05-07 NOTE — PLAN OF CARE
05/07/24 0851   Discharge Assessment   Assessment Type Discharge Planning Assessment   Confirmed/corrected address, phone number and insurance Yes   Source of Information patient   When was your last doctors appointment?   (PCP is Dr. Veto Rodriguez in [place].)   Reason For Admission Chest Pain   People in Home spouse   Do you expect to return to your current living situation? Yes   Do you have help at home or someone to help you manage your care at home? Yes   Who are your caregiver(s) and their phone number(s)? Vidhya/Sister/[phone]   Current cognitive status: Alert/Oriented   Walking or Climbing Stairs Difficulty no   Dressing/Bathing Difficulty no   Home Accessibility stairs to enter home   Number of Stairs, Main Entrance five   Home Layout Able to live on 1st floor   Equipment Currently Used at Home none   Readmission within 30 days? No   Do you currently have service(s) that help you manage your care at home? No   Do you take prescription medications? Yes   Do you have prescription coverage? Yes   Do you have any problems affording any of your prescribed medications? No   Who is going to help you get home at discharge? Sister   How do you get to doctors appointments? car, drives self   Are you on dialysis? No   Do you take coumadin? No   Discharge Plan A Home with family   Discharge Plan B Home Health   Discharge Plan discussed with: Patient   Housing Stability   In the last 12 months, was there a time when you were not able to pay the mortgage or rent on time? N   At any time in the past 12 months, were you homeless or living in a shelter (including now)? N   Transportation Needs   Has the lack of transportation kept you from medical appointments, meetings, work or from getting things needed for daily living? No   Food Insecurity   Within the past 12 months, you worried that your food would run out before you got the money to buy more. Never true   Within the past 12 months, the food you bought just  didn't last and you didn't have money to get more. Never true

## 2024-05-07 NOTE — PROGRESS NOTES
"[facility] - 6th Floor Medical Telemetry    Cardiology  Progress Note    Patient Name: Tanvi Miranda  MRN: [id number]  Admission Date: 5/5/2024  Hospital Length of Stay: 2 days  Code Status: Full Code   Attending Physician: Quentin Peterson MD   Primary Care Physician: Rupinder, Primary Doctor  Expected Discharge Date:   Principal Problem:<principal problem not specified>    Subjective:     Brief HPI/Hospital Course: 68-year-old female that is known to Dr. Doe with a PMHx of HLD, HTN, GERD and family Hx of early CAD. She presented to [facility] ED on 5.5.24 with complaints of left sided chest pain. Patient recently had a VAN/PET and ECHO completed by Dr. Doe and was planned to follow up 5.9.24 for results. She reports associated SOB and fatugue with exertion. She also report radiating left arm pain. Her last [facility] was back in 2018. ED Course: HR 66, /84, RR 20, Temp 98.9F SpO2 100% on RA. Lab work showed: WBC 3.98, K 4.1, BUN/Cr 10.3/0.78, Ca 10.1, AST/ALT 28/23, BNP 12.7, Trop: <0.010 X2, EKG shows no acute ST changes. [facility] will admit for chest pain.      5.7.24:  NAD. VSS. No complaints of CP/SOB/Palps. "I feel okay"    PMH: HLD, HTN, GERD and family Hx of early CAD  PSH: Colonoscopy, tonsillectomy, Hysterectomy   Family History: Mother: CAD s/p PCI, Alzheimers, PAD, Brother & Sister: CAD  Social History: Denies Tobacco, illicit drug, or ETOH use.      Previous Cardiac Diagnostics:   [facility] (5.6.24):  There is significant coronary artery disease. Mid Left Anterior Descending has a 60-70% stenosis. Instantaneous wave-free ratio (iFR) was performed on the lesion, and found to be non-hemodynamically significant at 0.96. 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 Ostial Left Circumflex has a 95% stenosis. The distal vessel is supplied via left-to-right collaterals from the LAD (septal perforating branches). Prox Right Coronary Artery has a calcified 90% stenosis. The vessel is moderately " tortuous. The distal vessel is supplied via left-to-right collaterals from the LAD (septal perforating branches). LVEDP is normal at 13 mmHg.    [facility] PET (5.2.24):  The left ventricular cavity is noted to be normal on the stress studies. The stress left ventricular ejection fraction was calculated to be 63% and left ventricular global function is normal. The rest left ventricular cavity is noted to be normal. The rest left ventricular ejection fraction was calculated to be 50% and rest left ventricular global function is normal.   When compared to the resting ejection fraction (50%), the stress ejection fraction (63%) has increased.   There was a rise in myocardial blood flow between rest and stress.  Global myocardial blood flow reserve was 2.88.  Myocardial blood flow reserve is reduced in the inferior territory which is suggestive of flow limiting stenosis in this territory.  *FINAL READ NOT DONE*     ECHO (5.2.24):  The study quality is average.   The left ventricle is normal in size. Global left ventricular systolic function is normal. The left ventricular ejection fraction is 60%.   Normal appearing valvular structures and function are within study limits.   The pulmonary artery systolic pressure is 10 mmHg. The pulmonary artery appears to be normal.     LHC ( 8.3.18):  LAD: arrises right from the aorta there is no LM. Significant calcification present in LAD. There is a 40-50% stenosis in the midpoint of the LAD. D2 is moderate in size with 20-30% stenosis   RCA: large prominent vessel. It is dominant. 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, Shows a long tubular  50% stenosis proximally with heavy calcification,  No obstructive disease was found   PLAN: Medical management     Review of Systems   Cardiovascular:  Negative for chest pain, dyspnea on exertion, leg swelling and  palpitations.   Respiratory:  Negative for shortness of breath.    All other systems reviewed and are negative.    Objective:     Vital Signs (Most Recent):  Temp: 98.5 °F (36.9 °C) (05/07/24 0738)  Pulse: 70 (05/07/24 0738)  Resp: 15 (05/07/24 0738)  BP: 135/78 (05/07/24 0738)  SpO2: 99 % (05/07/24 0738) Vital Signs (24h Range):  Temp:  [97.6 °F (36.4 °C)-98.5 °F (36.9 °C)] 98.5 °F (36.9 °C)  Pulse:  [54-70] 70  Resp:  [15-18] 15  SpO2:  [95 %-100 %] 99 %  BP: (121-151)/(62-81) 135/78   Weight: 78.7 kg (173 lb 8 oz)  Body mass index is 30.73 kg/m².  SpO2: 99 %       Intake/Output Summary (Last 24 hours) at 5/7/2024 0901  Last data filed at 5/6/2024 2158  Gross per 24 hour   Intake 702 ml   Output --   Net 702 ml     Lines/Drains/Airways       Peripheral Intravenous Line  Duration                  Peripheral IV - Single Lumen 05/05/24 1201 20 G Anterior;Proximal;Right Forearm 1 day                  Significant Labs:   Recent Results (from the past 72 hour(s))   EKG 12-lead    Collection Time: 05/05/24 11:34 AM   Result Value Ref Range    QRS Duration 70 ms    [facility] QTC Calculation 360 ms   Comprehensive metabolic panel    Collection Time: 05/05/24 11:56 AM   Result Value Ref Range    Sodium Level 145 136 - 145 mmol/L    Potassium Level 4.1 3.5 - 5.1 mmol/L    Chloride 110 (H) 98 - 107 mmol/L    Carbon Dioxide 23 23 - 31 mmol/L    Glucose Level 93 82 - 115 mg/dL    Blood Urea Nitrogen 10.3 9.8 - 20.1 mg/dL    Creatinine 0.78 0.55 - 1.02 mg/dL    Calcium Level Total 10.1 8.4 - 10.2 mg/dL    Protein Total 6.9 5.8 - 7.6 gm/dL    Albumin Level 3.9 3.4 - 4.8 g/dL    Globulin 3.0 2.4 - 3.5 gm/dL    Albumin/Globulin Ratio 1.3 1.1 - 2.0 ratio    Bilirubin Total 0.7 <=1.5 mg/dL    Alkaline Phosphatase 89 40 - 150 unit/L    Alanine Aminotransferase 22 0 - 55 unit/L    Aspartate Aminotransferase 28 5 - 34 unit/L    eGFR >60 mls/min/1.73/m2   Troponin I    Collection Time: 05/05/24 11:56 AM   Result Value Ref Range    Troponin-I  <0.010 0.000 - 0.045 ng/mL   Brain natriuretic peptide    Collection Time: 05/05/24 11:56 AM   Result Value Ref Range    Natriuretic Peptide 12.7 <=100.0 pg/mL   CBC with Differential    Collection Time: 05/05/24 11:56 AM   Result Value Ref Range    WBC 3.96 (L) 4.50 - 11.50 x10(3)/mcL    RBC 4.49 4.20 - 5.40 x10(6)/mcL    Hgb 13.4 12.0 - 16.0 g/dL    Hct 39.9 37.0 - 47.0 %    MCV 88.9 80.0 - 94.0 fL    MCH 29.8 27.0 - 31.0 pg    MCHC 33.6 33.0 - 36.0 g/dL    RDW 13.0 11.5 - 17.0 %    Platelet 165 130 - 400 x10(3)/mcL    MPV 10.0 7.4 - 10.4 fL    Neut % 50.4 %    Lymph % 37.1 %    Mono % 9.6 %    Eos % 1.8 %    Basophil % 0.8 %    Lymph # 1.47 0.6 - 4.6 x10(3)/mcL    Neut # 2.00 (L) 2.1 - 9.2 x10(3)/mcL    Mono # 0.38 0.1 - 1.3 x10(3)/mcL    Eos # 0.07 0 - 0.9 x10(3)/mcL    Baso # 0.03 <=0.2 x10(3)/mcL    IG# 0.01 0 - 0.04 x10(3)/mcL    IG% 0.3 %    NRBC% 0.0 %   Lipid Panel    Collection Time: 05/05/24 12:56 PM   Result Value Ref Range    Cholesterol Total 120 <=200 mg/dL    HDL Cholesterol 49 35 - 60 mg/dL    Triglyceride 54 37 - 140 mg/dL    Cholesterol/HDL Ratio 2 0 - 5    Very Low Density Lipoprotein 11     LDL Cholesterol 60.00 50.00 - 140.00 mg/dL   Troponin I    Collection Time: 05/05/24 12:56 PM   Result Value Ref Range    Troponin-I <0.010 0.000 - 0.045 ng/mL   Magnesium    Collection Time: 05/07/24  5:45 AM   Result Value Ref Range    Magnesium Level 2.10 1.60 - 2.60 mg/dL   Comprehensive Metabolic Panel    Collection Time: 05/07/24  5:45 AM   Result Value Ref Range    Sodium Level 140 136 - 145 mmol/L    Potassium Level 3.9 3.5 - 5.1 mmol/L    Chloride 109 (H) 98 - 107 mmol/L    Carbon Dioxide 26 23 - 31 mmol/L    Glucose Level 99 82 - 115 mg/dL    Blood Urea Nitrogen 11.8 9.8 - 20.1 mg/dL    Creatinine 0.78 0.55 - 1.02 mg/dL    Calcium Level Total 10.0 8.4 - 10.2 mg/dL    Protein Total 6.1 5.8 - 7.6 gm/dL    Albumin Level 3.7 3.4 - 4.8 g/dL    Globulin 2.4 2.4 - 3.5 gm/dL    Albumin/Globulin Ratio 1.5  1.1 - 2.0 ratio    Bilirubin Total 0.9 <=1.5 mg/dL    Alkaline Phosphatase 82 40 - 150 unit/L    Alanine Aminotransferase 17 0 - 55 unit/L    Aspartate Aminotransferase 21 5 - 34 unit/L    eGFR >60 mls/min/1.73/m2   CBC with Differential    Collection Time: 05/07/24  5:45 AM   Result Value Ref Range    WBC 4.54 4.50 - 11.50 x10(3)/mcL    RBC 4.58 4.20 - 5.40 x10(6)/mcL    Hgb 13.7 12.0 - 16.0 g/dL    Hct 42.0 37.0 - 47.0 %    MCV 91.7 80.0 - 94.0 fL    MCH 29.9 27.0 - 31.0 pg    MCHC 32.6 (L) 33.0 - 36.0 g/dL    RDW 13.1 11.5 - 17.0 %    Platelet 161 130 - 400 x10(3)/mcL    MPV 10.1 7.4 - 10.4 fL    Neut % 50.9 %    Lymph % 35.0 %    Mono % 9.7 %    Eos % 3.1 %    Basophil % 1.1 %    Lymph # 1.59 0.6 - 4.6 x10(3)/mcL    Neut # 2.31 2.1 - 9.2 x10(3)/mcL    Mono # 0.44 0.1 - 1.3 x10(3)/mcL    Eos # 0.14 0 - 0.9 x10(3)/mcL    Baso # 0.05 <=0.2 x10(3)/mcL    IG# 0.01 0 - 0.04 x10(3)/mcL    IG% 0.2 %    NRBC% 0.0 %     Telemetry:  SR    Physical Exam  Vitals and nursing note reviewed.   HENT:      Head: Normocephalic.      Nose: Nose normal.      Mouth/Throat:      Mouth: Mucous membranes are moist.   Eyes:      Pupils: Pupils are equal, round, and reactive to light.   Cardiovascular:      Rate and Rhythm: Normal rate and regular rhythm.      Pulses: Normal pulses.   Pulmonary:      Effort: Pulmonary effort is normal.      Breath sounds: Normal breath sounds.   Abdominal:      General: Bowel sounds are normal.      Palpations: Abdomen is soft.   Musculoskeletal:      Cervical back: Normal range of motion.   Skin:     General: Skin is warm.      Comments: R Wrist Soft/Flat, Non-Tender, No Sign of Bleed/Infection. +2 BLE Palpable Radial Pulses     Neurological:      Mental Status: She is alert and oriented to person, place, and time.   Psychiatric:         Mood and Affect: Mood normal.         Behavior: Behavior normal.       Current Inpatient Medications:  Current Facility-Administered Medications:     acetaminophen  tablet 650 mg, 650 mg, Oral, Q4H PRN, Celso Watts Jr., MD    aspirin EC tablet 325 mg, 325 mg, Oral, Once, Aubrey Davis III, MD    aspirin tablet 325 mg, 325 mg, Oral, Daily, Aubrey Davis III, MD, 325 mg at 05/06/24 0812    atorvastatin tablet 80 mg, 80 mg, Oral, QHS, Danuta Cheek NP, 80 mg at 05/06/24 2005    hydrALAZINE injection 10 mg, 10 mg, Intravenous, Q4H PRN, Celso Watts Jr., MD    HYDROcodone-acetaminophen 5-325 mg per tablet 1 tablet, 1 tablet, Oral, Q4H PRN, Celso Watts Jr., MD, 1 tablet at 05/06/24 1817    morphine injection 2 mg, 2 mg, Intravenous, Q4H PRN, Celso Watts Jr., MD    nitroGLYCERIN SL tablet 0.4 mg, 0.4 mg, Sublingual, Q5 Min PRN, Aubrey Davis III, MD    ondansetron injection 4 mg, 4 mg, Intravenous, Q8H PRN, Celso Watts Jr., MD    sodium chloride 0.9% flush 10 mL, 10 mL, Intravenous, PRN, Aubrey Davis III, MD    sodium chloride 0.9% flush 10 mL, 10 mL, Intravenous, PRN, Danuta Cheek NP  VTE Risk Mitigation (From admission, onward)           Ordered     IP VTE HIGH RISK PATIENT  Once         05/05/24 1244     Place sequential compression device  Until discontinued         05/05/24 1244                  Assessment:   MV CAD    - LHC (5.6.24): Severe CAD; Mid LAD 60-70% Stenosis IFR 0.96, Ostial Left Circumflex 95%, Prox RCA 90%    - PET (5.2.24): concern for Anterior ischemia ~ Dr. Zapata reviewed   Unstable Angina - Resolved   HLD  HTN  GERD  No Hx of GI bleed      Plan:   Await CV Surgery Recommendations   Hold Plavix; Last Dose of Plavix 5.6.24  Continue ASA and Statin   No BB Secondary to baseline Bradycardia  Labs in AM: CBC, BMP and Mag     TYLER Beatty  Cardiology  [facility] - 6th Floor Medical Telemetry  05/07/2024    Physician addendum:  I have seen and examined this patient as a split-shared visit with the EMILY d/t complicated medical management of above problems written in assessment and high acuity requiring  physician expertise in medical decision-making. I performed the substantive portion of the history and exam. Above medical decision-making is also formulated by me.    Cardiovascular exam:  S1, S2, RRR  Lungs:  Clear  Extremities:  no edema, right radial access site normal    Plan:  Await CTS recs  Patient willing to proceed with surgery  Hold plavix  Continue aspirin      Daniel Zapata MD  Cardiologist

## 2024-05-07 NOTE — CONSULTS
[facility]   Consult Note  Cardiothoracic Surgery    SUBJECTIVE:   History of Present Illness:  Patient is a 68 y.o. female with a PMH of HLD, HTN, and family history of early CAD.  She presented to [facility] ER on 5/5/24 complaining of left sided chest pain.  She also reports associated SOB and fatigue with activity that had been present for several weeks.  Patient recently had a PET with Dr. Doe with plans to follow up for results later this week.   Work up in the ED was essentially negative, but she was admitted under cardiology services.    5/6/24 LHC done revealing multivessel coronary artery disease.  CT surgery was consulted for CABG evaluation.    5/7/24: patient is resting comfortably in bed.  She denies any chest pain, shortness of breath, or palpitations.     Family History of Heart Disease: Significant for CAD (mother, brother, and sister)    No current facility-administered medications on file prior to encounter.     Current Outpatient Medications on File Prior to Encounter   Medication Sig Dispense Refill    clopidogreL (PLAVIX) 75 mg tablet Take 75 mg by mouth once daily.      lisinopriL 10 MG tablet Take 10 mg by mouth once daily.      rosuvastatin (CRESTOR) 40 MG Tab Take 40 mg by mouth once daily.          Review of patient's allergies indicates:  No Known Allergies     History reviewed. No pertinent past medical history.     Past Surgical History:   Procedure Laterality Date    LEFT HEART CATHETERIZATION Left 5/6/2024    Procedure: Left heart cath;  Surgeon: Celso Watts Jr., MD;  Location: [facility] CATH LAB;  Service: Cardiology;  Laterality: Left;           Review of Systems:  Review of Systems   Constitutional:  Negative for chills, fever and malaise/fatigue.   Respiratory:  Negative for cough, shortness of breath and wheezing.    Cardiovascular:  Negative for chest pain and palpitations.   Gastrointestinal:  Negative for nausea and vomiting.        OBJECTIVE:        Physical  Exam:  Physical Exam  Constitutional:       General: She is not in acute distress.     Appearance: She is obese. She is not ill-appearing.   HENT:      Head: Normocephalic and atraumatic.      Mouth/Throat:      Mouth: Mucous membranes are moist.      Pharynx: Oropharynx is clear.   Cardiovascular:      Rate and Rhythm: Normal rate and regular rhythm.      Pulses: Normal pulses.      Heart sounds: Normal heart sounds. No murmur heard.     No friction rub. No gallop.   Pulmonary:      Effort: Pulmonary effort is normal. No respiratory distress.      Breath sounds: Normal breath sounds. No wheezing, rhonchi or rales.   Abdominal:      General: There is no distension.      Palpations: Abdomen is soft.   Musculoskeletal:      Cervical back: Normal range of motion and neck supple.      Right lower leg: No edema.      Left lower leg: No edema.   Skin:     General: Skin is warm and dry.      Capillary Refill: Capillary refill takes less than 2 seconds.   Neurological:      General: No focal deficit present.      Mental Status: She is alert and oriented to person, place, and time.   Psychiatric:         Mood and Affect: Mood normal.         Behavior: Behavior normal.          Laboratory:  I have reviewed all pertinent lab results within the past 24 hours.  CBC:   Recent Labs   Lab 05/07/24  0545   WBC 4.54   RBC 4.58   HGB 13.7   HCT 42.0      MCV 91.7   MCH 29.9   MCHC 32.6*     CMP:   Recent Labs   Lab 05/07/24  0545   CALCIUM 10.0   ALBUMIN 3.7      K 3.9   CO2 26   BUN 11.8   CREATININE 0.78   ALKPHOS 82   ALT 17   AST 21   BILITOT 0.9     LFTs:   Recent Labs   Lab 05/07/24  0545   ALT 17   AST 21   ALKPHOS 82   BILITOT 0.9   ALBUMIN 3.7       Diagnostic Results:    [facility] 5/6/24  Summary         There is significant coronary artery disease.    Mid Left Anterior Descending has a 60-70% stenosis. Instantaneous wave-free ratio (iFR) was performed on the lesion, and found to be non-hemodynamically significant at  0.96.    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 Ostial Left Circumflex has a 95% stenosis. The distal vessel is supplied via left-to-right collaterals from the LAD (septal perforating branches).    Prox Right Coronary Artery has a calcified 90% stenosis. The vessel is moderately tortuous. The distal vessel is supplied via left-to-right collaterals from the LAD (septal perforating branches).    LVEDP is normal at 13 mmHg.    ASSESSMENT/PLAN:     Multivessel coronary artery disease  HTN  HLD  Significant family history of early CAD    -Continue current treatment plan  -CABG Thursday, 5/9/24 with Dr. Talamantes.  Risks and benefits of the surgery were discussed in detail with the patient.  She agrees to proceed.    Thank you for your consultation.  We will follow up with the patient.      TYLER Powell      The patient was seen and examined.  I agree of the all the above.  Plan surgery Thursday.  She understands the risks of bleeding infection myocardial infarction stroke renal failure and death.  She elected to proceed

## 2024-05-08 ENCOUNTER — ANESTHESIA EVENT (OUTPATIENT)
Dept: SURGERY | Facility: HOSPITAL | Age: 69
DRG: 236 | End: 2024-05-08
Payer: MEDICARE

## 2024-05-08 LAB
ABORH RETYPE: NORMAL
ANION GAP SERPL CALC-SCNC: 5 MEQ/L
APPEARANCE UR: CLEAR
APTT PPP: 67.6 SECONDS (ref 23.2–33.7)
BACTERIA #/AREA URNS AUTO: ABNORMAL /HPF
BASOPHILS # BLD AUTO: 0.04 X10(3)/MCL
BASOPHILS NFR BLD AUTO: 0.9 %
BILIRUB UR QL STRIP.AUTO: NEGATIVE
BUN SERPL-MCNC: 11.3 MG/DL (ref 9.8–20.1)
CALCIUM SERPL-MCNC: 10.2 MG/DL (ref 8.4–10.2)
CHLORIDE SERPL-SCNC: 110 MMOL/L (ref 98–107)
CO2 SERPL-SCNC: 24 MMOL/L (ref 23–31)
COLOR UR AUTO: ABNORMAL
CREAT SERPL-MCNC: 0.82 MG/DL (ref 0.55–1.02)
CREAT/UREA NIT SERPL: 14
EOSINOPHIL # BLD AUTO: 0.18 X10(3)/MCL (ref 0–0.9)
EOSINOPHIL NFR BLD AUTO: 4.1 %
ERYTHROCYTE [DISTWIDTH] IN BLOOD BY AUTOMATED COUNT: 12.9 % (ref 11.5–17)
GFR SERPLBLD CREATININE-BSD FMLA CKD-EPI: >60 ML/MIN/1.73/M2
GLUCOSE SERPL-MCNC: 102 MG/DL (ref 82–115)
GLUCOSE UR QL STRIP.AUTO: NORMAL
GROUP & RH: NORMAL
HCT VFR BLD AUTO: 42.4 % (ref 37–47)
HGB BLD-MCNC: 14 G/DL (ref 12–16)
IMM GRANULOCYTES # BLD AUTO: 0.01 X10(3)/MCL (ref 0–0.04)
IMM GRANULOCYTES NFR BLD AUTO: 0.2 %
INDIRECT COOMBS: NORMAL
INR PPP: 1
KETONES UR QL STRIP.AUTO: NEGATIVE
LEUKOCYTE ESTERASE UR QL STRIP.AUTO: NEGATIVE
LYMPHOCYTES # BLD AUTO: 1.96 X10(3)/MCL (ref 0.6–4.6)
LYMPHOCYTES NFR BLD AUTO: 44.2 %
MAGNESIUM SERPL-MCNC: 2.1 MG/DL (ref 1.6–2.6)
MCH RBC QN AUTO: 29.4 PG (ref 27–31)
MCHC RBC AUTO-ENTMCNC: 33 G/DL (ref 33–36)
MCV RBC AUTO: 88.9 FL (ref 80–94)
MONOCYTES # BLD AUTO: 0.45 X10(3)/MCL (ref 0.1–1.3)
MONOCYTES NFR BLD AUTO: 10.2 %
MRSA PCR SCRN (OHS): NOT DETECTED
MUCOUS THREADS URNS QL MICRO: ABNORMAL /LPF
NEUTROPHILS # BLD AUTO: 1.79 X10(3)/MCL (ref 2.1–9.2)
NEUTROPHILS NFR BLD AUTO: 40.4 %
NITRITE UR QL STRIP.AUTO: NEGATIVE
NRBC BLD AUTO-RTO: 0 %
PH UR STRIP.AUTO: 5.5 [PH]
PLATELET # BLD AUTO: 176 X10(3)/MCL (ref 130–400)
PMV BLD AUTO: 10 FL (ref 7.4–10.4)
POTASSIUM SERPL-SCNC: 4.4 MMOL/L (ref 3.5–5.1)
PROT UR QL STRIP.AUTO: NEGATIVE
PROTHROMBIN TIME: 13.1 SECONDS (ref 12.5–14.5)
RBC # BLD AUTO: 4.77 X10(6)/MCL (ref 4.2–5.4)
RBC #/AREA URNS AUTO: ABNORMAL /HPF
RBC UR QL AUTO: NEGATIVE
SODIUM SERPL-SCNC: 139 MMOL/L (ref 136–145)
SP GR UR STRIP.AUTO: 1.02 (ref 1–1.03)
SPECIMEN OUTDATE: NORMAL
SQUAMOUS #/AREA URNS LPF: ABNORMAL /HPF
UROBILINOGEN UR STRIP-ACNC: NORMAL
WBC # SPEC AUTO: 4.43 X10(3)/MCL (ref 4.5–11.5)
WBC #/AREA URNS AUTO: ABNORMAL /HPF

## 2024-05-08 PROCEDURE — 36415 COLL VENOUS BLD VENIPUNCTURE: CPT

## 2024-05-08 PROCEDURE — 85610 PROTHROMBIN TIME: CPT | Performed by: THORACIC SURGERY (CARDIOTHORACIC VASCULAR SURGERY)

## 2024-05-08 PROCEDURE — 86901 BLOOD TYPING SEROLOGIC RH(D): CPT | Performed by: THORACIC SURGERY (CARDIOTHORACIC VASCULAR SURGERY)

## 2024-05-08 PROCEDURE — 99499 UNLISTED E&M SERVICE: CPT | Mod: ,,,

## 2024-05-08 PROCEDURE — 86900 BLOOD TYPING SEROLOGIC ABO: CPT | Performed by: THORACIC SURGERY (CARDIOTHORACIC VASCULAR SURGERY)

## 2024-05-08 PROCEDURE — 85025 COMPLETE CBC W/AUTO DIFF WBC: CPT

## 2024-05-08 PROCEDURE — 85730 THROMBOPLASTIN TIME PARTIAL: CPT | Performed by: THORACIC SURGERY (CARDIOTHORACIC VASCULAR SURGERY)

## 2024-05-08 PROCEDURE — 25000003 PHARM REV CODE 250: Performed by: EMERGENCY MEDICINE

## 2024-05-08 PROCEDURE — 83735 ASSAY OF MAGNESIUM: CPT

## 2024-05-08 PROCEDURE — 36415 COLL VENOUS BLD VENIPUNCTURE: CPT | Performed by: THORACIC SURGERY (CARDIOTHORACIC VASCULAR SURGERY)

## 2024-05-08 PROCEDURE — 21400001 HC TELEMETRY ROOM

## 2024-05-08 PROCEDURE — 81001 URINALYSIS AUTO W/SCOPE: CPT | Performed by: THORACIC SURGERY (CARDIOTHORACIC VASCULAR SURGERY)

## 2024-05-08 PROCEDURE — 86850 RBC ANTIBODY SCREEN: CPT | Performed by: THORACIC SURGERY (CARDIOTHORACIC VASCULAR SURGERY)

## 2024-05-08 PROCEDURE — 86923 COMPATIBILITY TEST ELECTRIC: CPT | Mod: 91

## 2024-05-08 PROCEDURE — 25000003 PHARM REV CODE 250: Performed by: NURSE PRACTITIONER

## 2024-05-08 PROCEDURE — 87641 MR-STAPH DNA AMP PROBE: CPT | Performed by: THORACIC SURGERY (CARDIOTHORACIC VASCULAR SURGERY)

## 2024-05-08 PROCEDURE — 80048 BASIC METABOLIC PNL TOTAL CA: CPT

## 2024-05-08 RX ORDER — MUPIROCIN 20 MG/G
OINTMENT TOPICAL
Status: DISCONTINUED | OUTPATIENT
Start: 2024-05-08 | End: 2024-05-14 | Stop reason: HOSPADM

## 2024-05-08 RX ADMIN — ASPIRIN 325 MG ORAL TABLET 325 MG: 325 PILL ORAL at 08:05

## 2024-05-08 RX ADMIN — ATORVASTATIN CALCIUM 80 MG: 40 TABLET, FILM COATED ORAL at 07:05

## 2024-05-08 NOTE — PROGRESS NOTES
"[facility] - 6th Floor Medical Telemetry    Cardiology  Progress Note    Patient Name: Tavni Miranda  MRN: [id number]  Admission Date: 5/5/2024  Hospital Length of Stay: 3 days  Code Status: Full Code   Attending Physician: Quentin Peterson MD   Primary Care Physician: Rupinder, Primary Doctor  Expected Discharge Date:   Principal Problem:<principal problem not specified>    Subjective:     Brief HPI/Hospital Course: 68-year-old female that is known to Dr. Doe with a PMHx of HLD, HTN, GERD and family Hx of early CAD. She presented to [facility] ED on 5.5.24 with complaints of left sided chest pain. Patient recently had a VAN/PET and ECHO completed by Dr. Doe and was planned to follow up 5.9.24 for results. She reports associated SOB and fatugue with exertion. She also report radiating left arm pain. Her last [facility] was back in 2018. ED Course: HR 66, /84, RR 20, Temp 98.9F SpO2 100% on RA. Lab work showed: WBC 3.98, K 4.1, BUN/Cr 10.3/0.78, Ca 10.1, AST/ALT 28/23, BNP 12.7, Trop: <0.010 X2, EKG shows no acute ST changes. [facility] will admit for chest pain.      5.7.24:  NAD. VSS. No complaints of CP/SOB/Palps. "I feel okay"  5.8.24: NAD. VSS. No complaints of CP/SOB/Palps. "I feel okay" CV surgery planning CABG for tomorrow. Resting comfortably in bed.    PMH: HLD, HTN, GERD and family Hx of early CAD  PSH: Colonoscopy, tonsillectomy, Hysterectomy   Family History: Mother: CAD s/p PCI, Alzheimers, PAD, Brother & Sister: CAD  Social History: Denies Tobacco, illicit drug, or ETOH use.      Previous Cardiac Diagnostics:   [facility] (5.6.24):  There is significant coronary artery disease. Mid Left Anterior Descending has a 60-70% stenosis. Instantaneous wave-free ratio (iFR) was performed on the lesion, and found to be non-hemodynamically significant at 0.96. 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 Ostial Left Circumflex has a 95% stenosis. The distal vessel is supplied via left-to-right collaterals " from the LAD (septal perforating branches). Prox Right Coronary Artery has a calcified 90% stenosis. The vessel is moderately tortuous. The distal vessel is supplied via left-to-right collaterals from the LAD (septal perforating branches). LVEDP is normal at 13 mmHg.    [facility] PET (5.2.24):  The left ventricular cavity is noted to be normal on the stress studies. The stress left ventricular ejection fraction was calculated to be 63% and left ventricular global function is normal. The rest left ventricular cavity is noted to be normal. The rest left ventricular ejection fraction was calculated to be 50% and rest left ventricular global function is normal.   When compared to the resting ejection fraction (50%), the stress ejection fraction (63%) has increased.   There was a rise in myocardial blood flow between rest and stress.  Global myocardial blood flow reserve was 2.88.  Myocardial blood flow reserve is reduced in the inferior territory which is suggestive of flow limiting stenosis in this territory.  *FINAL READ NOT DONE*     ECHO (5.2.24):  The study quality is average.   The left ventricle is normal in size. Global left ventricular systolic function is normal. The left ventricular ejection fraction is 60%.   Normal appearing valvular structures and function are within study limits.   The pulmonary artery systolic pressure is 10 mmHg. The pulmonary artery appears to be normal.     LHC ( 8.3.18):  LAD: arrises right from the aorta there is no LM. Significant calcification present in LAD. There is a 40-50% stenosis in the midpoint of the LAD. D2 is moderate in size with 20-30% stenosis   RCA: large prominent vessel. It is dominant. 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, Shows a long tubular  50% stenosis proximally with heavy calcification,  No obstructive disease was found    PLAN: Medical management     Review of Systems   Cardiovascular:  Negative for chest pain, dyspnea on exertion, leg swelling and palpitations.   Respiratory:  Negative for shortness of breath.    All other systems reviewed and are negative.    Objective:     Vital Signs (Most Recent):  Temp: 97.5 °F (36.4 °C) (05/08/24 0749)  Pulse: 68 (05/08/24 0749)  Resp: 17 (05/08/24 0749)  BP: (!) 145/85 (05/08/24 0749)  SpO2: 100 % (05/08/24 0749) Vital Signs (24h Range):  Temp:  [97.5 °F (36.4 °C)-98.3 °F (36.8 °C)] 97.5 °F (36.4 °C)  Pulse:  [57-68] 68  Resp:  [16-18] 17  SpO2:  [97 %-100 %] 100 %  BP: (132-145)/(72-87) 145/85   Weight: 78.7 kg (173 lb 8 oz)  Body mass index is 30.73 kg/m².  SpO2: 100 %       Intake/Output Summary (Last 24 hours) at 5/8/2024 0826  Last data filed at 5/7/2024 2211  Gross per 24 hour   Intake 1328 ml   Output 1800 ml   Net -472 ml     Lines/Drains/Airways       Peripheral Intravenous Line  Duration                  Peripheral IV - Single Lumen 05/05/24 1201 20 G Anterior;Proximal;Right Forearm 2 days                  Significant Labs:   Recent Results (from the past 72 hour(s))   EKG 12-lead    Collection Time: 05/05/24 11:34 AM   Result Value Ref Range    QRS Duration 70 ms    [facility] QTC Calculation 360 ms   Comprehensive metabolic panel    Collection Time: 05/05/24 11:56 AM   Result Value Ref Range    Sodium Level 145 136 - 145 mmol/L    Potassium Level 4.1 3.5 - 5.1 mmol/L    Chloride 110 (H) 98 - 107 mmol/L    Carbon Dioxide 23 23 - 31 mmol/L    Glucose Level 93 82 - 115 mg/dL    Blood Urea Nitrogen 10.3 9.8 - 20.1 mg/dL    Creatinine 0.78 0.55 - 1.02 mg/dL    Calcium Level Total 10.1 8.4 - 10.2 mg/dL    Protein Total 6.9 5.8 - 7.6 gm/dL    Albumin Level 3.9 3.4 - 4.8 g/dL    Globulin 3.0 2.4 - 3.5 gm/dL    Albumin/Globulin Ratio 1.3 1.1 - 2.0 ratio    Bilirubin Total 0.7 <=1.5 mg/dL    Alkaline Phosphatase 89 40 - 150 unit/L    Alanine Aminotransferase 22 0 - 55 unit/L    Aspartate  Aminotransferase 28 5 - 34 unit/L    eGFR >60 mls/min/1.73/m2   Troponin I    Collection Time: 05/05/24 11:56 AM   Result Value Ref Range    Troponin-I <0.010 0.000 - 0.045 ng/mL   Brain natriuretic peptide    Collection Time: 05/05/24 11:56 AM   Result Value Ref Range    Natriuretic Peptide 12.7 <=100.0 pg/mL   CBC with Differential    Collection Time: 05/05/24 11:56 AM   Result Value Ref Range    WBC 3.96 (L) 4.50 - 11.50 x10(3)/mcL    RBC 4.49 4.20 - 5.40 x10(6)/mcL    Hgb 13.4 12.0 - 16.0 g/dL    Hct 39.9 37.0 - 47.0 %    MCV 88.9 80.0 - 94.0 fL    MCH 29.8 27.0 - 31.0 pg    MCHC 33.6 33.0 - 36.0 g/dL    RDW 13.0 11.5 - 17.0 %    Platelet 165 130 - 400 x10(3)/mcL    MPV 10.0 7.4 - 10.4 fL    Neut % 50.4 %    Lymph % 37.1 %    Mono % 9.6 %    Eos % 1.8 %    Basophil % 0.8 %    Lymph # 1.47 0.6 - 4.6 x10(3)/mcL    Neut # 2.00 (L) 2.1 - 9.2 x10(3)/mcL    Mono # 0.38 0.1 - 1.3 x10(3)/mcL    Eos # 0.07 0 - 0.9 x10(3)/mcL    Baso # 0.03 <=0.2 x10(3)/mcL    IG# 0.01 0 - 0.04 x10(3)/mcL    IG% 0.3 %    NRBC% 0.0 %   Lipid Panel    Collection Time: 05/05/24 12:56 PM   Result Value Ref Range    Cholesterol Total 120 <=200 mg/dL    HDL Cholesterol 49 35 - 60 mg/dL    Triglyceride 54 37 - 140 mg/dL    Cholesterol/HDL Ratio 2 0 - 5    Very Low Density Lipoprotein 11     LDL Cholesterol 60.00 50.00 - 140.00 mg/dL   Troponin I    Collection Time: 05/05/24 12:56 PM   Result Value Ref Range    Troponin-I <0.010 0.000 - 0.045 ng/mL   Magnesium    Collection Time: 05/07/24  5:45 AM   Result Value Ref Range    Magnesium Level 2.10 1.60 - 2.60 mg/dL   Comprehensive Metabolic Panel    Collection Time: 05/07/24  5:45 AM   Result Value Ref Range    Sodium Level 140 136 - 145 mmol/L    Potassium Level 3.9 3.5 - 5.1 mmol/L    Chloride 109 (H) 98 - 107 mmol/L    Carbon Dioxide 26 23 - 31 mmol/L    Glucose Level 99 82 - 115 mg/dL    Blood Urea Nitrogen 11.8 9.8 - 20.1 mg/dL    Creatinine 0.78 0.55 - 1.02 mg/dL    Calcium Level Total 10.0  8.4 - 10.2 mg/dL    Protein Total 6.1 5.8 - 7.6 gm/dL    Albumin Level 3.7 3.4 - 4.8 g/dL    Globulin 2.4 2.4 - 3.5 gm/dL    Albumin/Globulin Ratio 1.5 1.1 - 2.0 ratio    Bilirubin Total 0.9 <=1.5 mg/dL    Alkaline Phosphatase 82 40 - 150 unit/L    Alanine Aminotransferase 17 0 - 55 unit/L    Aspartate Aminotransferase 21 5 - 34 unit/L    eGFR >60 mls/min/1.73/m2   CBC with Differential    Collection Time: 05/07/24  5:45 AM   Result Value Ref Range    WBC 4.54 4.50 - 11.50 x10(3)/mcL    RBC 4.58 4.20 - 5.40 x10(6)/mcL    Hgb 13.7 12.0 - 16.0 g/dL    Hct 42.0 37.0 - 47.0 %    MCV 91.7 80.0 - 94.0 fL    MCH 29.9 27.0 - 31.0 pg    MCHC 32.6 (L) 33.0 - 36.0 g/dL    RDW 13.1 11.5 - 17.0 %    Platelet 161 130 - 400 x10(3)/mcL    MPV 10.1 7.4 - 10.4 fL    Neut % 50.9 %    Lymph % 35.0 %    Mono % 9.7 %    Eos % 3.1 %    Basophil % 1.1 %    Lymph # 1.59 0.6 - 4.6 x10(3)/mcL    Neut # 2.31 2.1 - 9.2 x10(3)/mcL    Mono # 0.44 0.1 - 1.3 x10(3)/mcL    Eos # 0.14 0 - 0.9 x10(3)/mcL    Baso # 0.05 <=0.2 x10(3)/mcL    IG# 0.01 0 - 0.04 x10(3)/mcL    IG% 0.2 %    NRBC% 0.0 %   CV Ultrasound Bilateral Doppler Carotid    Collection Time: 05/07/24 12:02 PM   Result Value Ref Range    Right CCA prox sys 80 cm/s    Right CCA prox shaver 9 cm/s    Right CCA dist sys 71 cm/s    Right CCA dist shaver 15 cm/s    Right ICA prox sys 58 cm/s    Right ICA prox shaver 14 cm/s    Right ICA mid sys 49 cm/s    Right ICA mid shaver 13 cm/s    Right ECA sys 86 cm/s    Right ECA shaver 11 cm/s    Right ICA/CCA ratio 0.82     Right Highest ICA 58.00     Right Highest EDV 14.00     Right Highest CCA 80     Left CCA prox sys 85 cm/s    Left CCA prox shaver 20 cm/s    Left CCA dist sys 71 cm/s    Left CCA dist shaver 18 cm/s    Left ICA prox sys 72 cm/s    Left ICA prox shaver 18 cm/s    Left ICA mid sys 70 cm/s    Left ICA mid shaver 18 cm/s    Left ICA dist sys 82 cm/s    Left ICA dist shaver 25 cm/s    Left ECA sys 56 cm/s    Left ECA shaver 7 cm/s    Left vertebral sys 44  cm/s    Left vertebral shaver 0 cm/s    Left ICA/CCA ratio 1.15     Left Highest ICA 82.00     LT Highest EDV 25.00     Left Highest CCA 85     Right vertebral sys 40.20 cm/s    Right vertebral shaver 9.94 cm/s   Basic Metabolic Panel    Collection Time: 05/08/24  4:21 AM   Result Value Ref Range    Sodium Level 139 136 - 145 mmol/L    Potassium Level 4.4 3.5 - 5.1 mmol/L    Chloride 110 (H) 98 - 107 mmol/L    Carbon Dioxide 24 23 - 31 mmol/L    Glucose Level 102 82 - 115 mg/dL    Blood Urea Nitrogen 11.3 9.8 - 20.1 mg/dL    Creatinine 0.82 0.55 - 1.02 mg/dL    BUN/Creatinine Ratio 14     Calcium Level Total 10.2 8.4 - 10.2 mg/dL    Anion Gap 5.0 mEq/L    eGFR >60 mL/min/1.73/m2   Magnesium    Collection Time: 05/08/24  4:21 AM   Result Value Ref Range    Magnesium Level 2.10 1.60 - 2.60 mg/dL   CBC with Differential    Collection Time: 05/08/24  4:21 AM   Result Value Ref Range    WBC 4.43 (L) 4.50 - 11.50 x10(3)/mcL    RBC 4.77 4.20 - 5.40 x10(6)/mcL    Hgb 14.0 12.0 - 16.0 g/dL    Hct 42.4 37.0 - 47.0 %    MCV 88.9 80.0 - 94.0 fL    MCH 29.4 27.0 - 31.0 pg    MCHC 33.0 33.0 - 36.0 g/dL    RDW 12.9 11.5 - 17.0 %    Platelet 176 130 - 400 x10(3)/mcL    MPV 10.0 7.4 - 10.4 fL    Neut % 40.4 %    Lymph % 44.2 %    Mono % 10.2 %    Eos % 4.1 %    Basophil % 0.9 %    Lymph # 1.96 0.6 - 4.6 x10(3)/mcL    Neut # 1.79 (L) 2.1 - 9.2 x10(3)/mcL    Mono # 0.45 0.1 - 1.3 x10(3)/mcL    Eos # 0.18 0 - 0.9 x10(3)/mcL    Baso # 0.04 <=0.2 x10(3)/mcL    IG# 0.01 0 - 0.04 x10(3)/mcL    IG% 0.2 %    NRBC% 0.0 %   Protime-INR    Collection Time: 05/08/24  4:33 AM   Result Value Ref Range    PT 13.1 12.5 - 14.5 seconds    INR 1.0 <=1.3   APTT    Collection Time: 05/08/24  4:33 AM   Result Value Ref Range    PTT 67.6 (H) 23.2 - 33.7 seconds   Type & Screen    Collection Time: 05/08/24  4:33 AM   Result Value Ref Range    Group & Rh O NEG     Indirect Brenna GEL NEG     Specimen Outdate 05/11/2024 23:59    Urinalysis    Collection Time:  05/08/24  4:39 AM   Result Value Ref Range    Color, UA Light-Yellow Yellow, Light-Yellow, Colorless, Straw, Dark-Yellow    Appearance, UA Clear Clear    Specific Gravity, UA 1.020 1.005 - 1.030    pH, UA 5.5 5.0 - 8.5    Protein, UA Negative Negative    Glucose, UA Normal Negative, Normal    Ketones, UA Negative Negative    Blood, UA Negative Negative    Bilirubin, UA Negative Negative    Urobilinogen, UA Normal 0.2, 1.0, Normal    Nitrites, UA Negative Negative    Leukocyte Esterase, UA Negative Negative    WBC, UA 0-5 None Seen, 0-2, 3-5, 0-5 /HPF    Bacteria, UA None Seen None Seen, Trace /HPF    Squamous Epithelial Cells, UA Trace None Seen /HPF    Mucous, UA Trace (A) None Seen /LPF    RBC, UA 0-5 None Seen, 0-2, 3-5, 0-5 /HPF   MRSA PCR    Collection Time: 05/08/24  4:39 AM   Result Value Ref Range    MRSA PCR SCRN ([facility]) Not Detected Not Detected   ABORH RETYPE    Collection Time: 05/08/24  5:40 AM   Result Value Ref Range    ABORH Retype O NEG      Telemetry:  SR    Physical Exam  Vitals and nursing note reviewed.   HENT:      Head: Normocephalic.      Nose: Nose normal.      Mouth/Throat:      Mouth: Mucous membranes are moist.   Eyes:      Pupils: Pupils are equal, round, and reactive to light.   Cardiovascular:      Rate and Rhythm: Normal rate and regular rhythm.      Pulses: Normal pulses.   Pulmonary:      Effort: Pulmonary effort is normal.      Breath sounds: Normal breath sounds.   Abdominal:      General: Bowel sounds are normal.      Palpations: Abdomen is soft.   Musculoskeletal:      Cervical back: Normal range of motion.   Skin:     General: Skin is warm.      Comments: R Wrist Soft/Flat, Non-Tender, No Sign of Bleed/Infection. +2 BLE Palpable Radial Pulses     Neurological:      Mental Status: She is alert and oriented to person, place, and time.   Psychiatric:         Mood and Affect: Mood normal.         Behavior: Behavior normal.       Current Inpatient Medications:  Current  Facility-Administered Medications:     acetaminophen tablet 650 mg, 650 mg, Oral, Q4H PRN, Celso Watts Jr., MD, 650 mg at 05/07/24 1230    aspirin EC tablet 325 mg, 325 mg, Oral, Once, Aubrey Davis III, MD    aspirin tablet 325 mg, 325 mg, Oral, Daily, Aubrey Davis III, MD, 325 mg at 05/07/24 0934    atorvastatin tablet 80 mg, 80 mg, Oral, QHS, Danuta Cheek NP, 80 mg at 05/07/24 1959    hydrALAZINE injection 10 mg, 10 mg, Intravenous, Q4H PRN, Celso Watts Jr., MD    HYDROcodone-acetaminophen 5-325 mg per tablet 1 tablet, 1 tablet, Oral, Q4H PRN, Celso Watts Jr., MD, 1 tablet at 05/07/24 2001    morphine injection 2 mg, 2 mg, Intravenous, Q4H PRN, Celso Watts Jr., MD    mupirocin 2 % ointment, , Nasal, On Call Procedure, Nehemias Talamantes MD    nitroGLYCERIN SL tablet 0.4 mg, 0.4 mg, Sublingual, Q5 Min PRN, Aubrey Davis III, MD    ondansetron injection 4 mg, 4 mg, Intravenous, Q8H PRN, Celso Watts Jr., MD    sodium chloride 0.9% flush 10 mL, 10 mL, Intravenous, PRN, Aubrey Davis III, MD    sodium chloride 0.9% flush 10 mL, 10 mL, Intravenous, PRN, Danuta Cheek NP  VTE Risk Mitigation (From admission, onward)           Ordered     IP VTE HIGH RISK PATIENT  Once         05/05/24 1244     Place sequential compression device  Until discontinued         05/05/24 1244                  Assessment:   MV CAD    - LHC (5.6.24): Severe CAD; Mid LAD 60-70% Stenosis IFR 0.96, Ostial Left Circumflex 95%, Prox RCA 90%    - PET (5.2.24): concern for Anterior ischemia ~ Dr. Zapata reviewed   Unstable Angina - Resolved   HLD  HTN  GERD  No Hx of GI bleed    Plan:   CV Surgery planning CABG on 4.9.24  NPO after MN  Hold Plavix; Last Dose of Plavix 5.6.24  Continue ASA and Statin   No BB Secondary to baseline Bradycardia  Labs in AM: CBC, CMP and Mag     TYLER Beatty  Cardiology  [facility] - 6th Floor Medical Telemetry  05/08/2024    I agree with the findings of  the complexity of problems addressed and take responsibility for the plan's risks and complications. I approved the plan documented by Osiris Julian NP.

## 2024-05-08 NOTE — PROGRESS NOTES
CT SURGERY PROGRESS NOTE  Tanvi Miranda  68 y.o.  1955    Patients Procedure: Procedure(s) (LRB):  Left heart cath (Left)    Subjective  Interval History: Patient is resting comfortably in bed, no distress.  No acute events reported overnight.  She continues to deny any chest pain, shortness of breath, or palpitations.      Review of Systems   Constitutional:  Negative for chills, fever and malaise/fatigue.   Respiratory:  Negative for cough, shortness of breath and wheezing.    Cardiovascular:  Negative for chest pain, palpitations and leg swelling.   Gastrointestinal:  Negative for nausea and vomiting.       Medication List  Infusions    Scheduled   aspirin  325 mg Oral Once    aspirin  325 mg Oral Daily    atorvastatin  80 mg Oral QHS       Objective:  Recent Vitals:  Temp:  [97.5 °F (36.4 °C)-98.3 °F (36.8 °C)] 97.5 °F (36.4 °C)  Pulse:  [57-68] 68  Resp:  [16-18] 17  SpO2:  [97 %-100 %] 100 %  BP: (132-145)/(72-87) 145/85    Physical Exam  Constitutional:       General: She is not in acute distress.     Appearance: She is obese. She is not ill-appearing.   HENT:      Head: Normocephalic and atraumatic.      Mouth/Throat:      Mouth: Mucous membranes are moist.      Pharynx: Oropharynx is clear.   Cardiovascular:      Rate and Rhythm: Normal rate and regular rhythm.      Pulses: Normal pulses.      Heart sounds: Normal heart sounds. No murmur heard.     No friction rub. No gallop.   Pulmonary:      Effort: Pulmonary effort is normal. No respiratory distress.      Breath sounds: Normal breath sounds. No wheezing, rhonchi or rales.   Abdominal:      General: There is no distension.      Palpations: Abdomen is soft.   Musculoskeletal:      Cervical back: Normal range of motion and neck supple.      Right lower leg: No edema.      Left lower leg: No edema.   Skin:     General: Skin is warm and dry.      Capillary Refill: Capillary refill takes less than 2 seconds.   Neurological:      General: No focal  deficit present.      Mental Status: She is alert and oriented to person, place, and time.   Psychiatric:         Mood and Affect: Mood normal.         Behavior: Behavior normal.     I/O last 24 hrs:  Intake/Output - Last 3 Shifts         05/06 0700 05/07 0659 05/07 0700 05/08 0659 05/08 0700 05/09 0659    P.O. 702 1328     Total Intake(mL/kg) 702 (8.9) 1328 (16.9)     Urine (mL/kg/hr)  1800 (1)     Total Output  1800     Net +702 -472            Urine Occurrence 3 x 5 x     Stool Occurrence 0 x 0 x             Labs  CBC:   Recent Labs   Lab 05/08/24  0421   WBC 4.43*   RBC 4.77   HGB 14.0   HCT 42.4      MCV 88.9   MCH 29.4   MCHC 33.0     CMP:   Recent Labs   Lab 05/07/24  0545 05/08/24  0421   CALCIUM 10.0 10.2   ALBUMIN 3.7  --     139   K 3.9 4.4   CO2 26 24   BUN 11.8 11.3   CREATININE 0.78 0.82   ALKPHOS 82  --    ALT 17  --    AST 21  --    BILITOT 0.9  --      LFTs:   Recent Labs   Lab 05/07/24  0545   ALT 17   AST 21   ALKPHOS 82   BILITOT 0.9   ALBUMIN 3.7     All pertinent labs from the last 24 hours have been reviewed.      ASSESSMENT/PLAN:    Multivessel coronary artery disease  HTN  HLD  Significant family history of early CAD     -Continue current treatment plan  -CABG tomorrow, 5/9/24  -NPO after midnight tonight    Case and plan of care discussed with Dr. Albin Lonodn, GHANSHYAMP

## 2024-05-09 ENCOUNTER — ANESTHESIA (OUTPATIENT)
Dept: SURGERY | Facility: HOSPITAL | Age: 69
DRG: 236 | End: 2024-05-09
Payer: MEDICARE

## 2024-05-09 PROBLEM — I25.10 ATHEROSCLEROSIS OF NATIVE CORONARY ARTERY OF NATIVE HEART WITHOUT ANGINA PECTORIS: Status: ACTIVE | Noted: 2024-05-09

## 2024-05-09 LAB
ABO + RH BLD: NORMAL
ALBUMIN SERPL-MCNC: 3.6 G/DL (ref 3.4–4.8)
ALBUMIN/GLOB SERPL: 1.3 RATIO (ref 1.1–2)
ALLENS TEST BLOOD GAS (OHS): ABNORMAL
ALP SERPL-CCNC: 81 UNIT/L (ref 40–150)
ALT SERPL-CCNC: 13 UNIT/L (ref 0–55)
ANION GAP SERPL CALC-SCNC: 0 MEQ/L
APTT PPP: 43.8 SECONDS (ref 23.2–33.7)
AST SERPL-CCNC: 18 UNIT/L (ref 5–34)
BASE EXCESS BLD CALC-SCNC: -3.7 MMOL/L (ref -2–2)
BASE EXCESS BLD CALC-SCNC: -4.5 MMOL/L (ref -2–2)
BASE EXCESS BLD CALC-SCNC: 1 MMOL/L (ref -2–2)
BASOPHILS # BLD AUTO: 0.02 X10(3)/MCL
BASOPHILS # BLD AUTO: 0.04 X10(3)/MCL
BASOPHILS NFR BLD AUTO: 0.3 %
BASOPHILS NFR BLD AUTO: 0.7 %
BILIRUB SERPL-MCNC: 0.7 MG/DL
BLD PROD TYP BPU: NORMAL
BLOOD GAS SAMPLE TYPE (OHS): ABNORMAL
BLOOD UNIT EXPIRATION DATE: NORMAL
BLOOD UNIT TYPE CODE: 9500
BUN SERPL-MCNC: 11.9 MG/DL (ref 9.8–20.1)
BUN SERPL-MCNC: 13.7 MG/DL (ref 9.8–20.1)
CA-I BLD-SCNC: 1.12 MMOL/L (ref 1.12–1.23)
CA-I BLD-SCNC: 1.26 MMOL/L (ref 1.12–1.23)
CA-I BLD-SCNC: 1.3 MMOL/L (ref 1.12–1.23)
CALCIUM SERPL-MCNC: 10.2 MG/DL (ref 8.4–10.2)
CALCIUM SERPL-MCNC: 9.8 MG/DL (ref 8.4–10.2)
CHLORIDE SERPL-SCNC: 111 MMOL/L (ref 98–107)
CHLORIDE SERPL-SCNC: 117 MMOL/L (ref 98–107)
CO2 BLDA-SCNC: 21.3 MMOL/L
CO2 BLDA-SCNC: 23.5 MMOL/L
CO2 BLDA-SCNC: 28.6 MMOL/L
CO2 SERPL-SCNC: 23 MMOL/L (ref 23–31)
CO2 SERPL-SCNC: 24 MMOL/L (ref 23–31)
COHGB MFR BLDA: 1.3 % (ref 0.5–1.5)
COHGB MFR BLDA: 1.4 % (ref 0.5–1.5)
COHGB MFR BLDA: 2.5 % (ref 0.5–1.5)
CPAP BLOOD GAS (OHS): 5 CM H2O
CREAT SERPL-MCNC: 0.68 MG/DL (ref 0.55–1.02)
CREAT SERPL-MCNC: 0.77 MG/DL (ref 0.55–1.02)
CREAT/UREA NIT SERPL: 18
CROSSMATCH INTERPRETATION: NORMAL
DISPENSE STATUS: NORMAL
DRAWN BY BLOOD GAS (OHS): ABNORMAL
EOSINOPHIL # BLD AUTO: 0.12 X10(3)/MCL (ref 0–0.9)
EOSINOPHIL # BLD AUTO: 0.17 X10(3)/MCL (ref 0–0.9)
EOSINOPHIL NFR BLD AUTO: 2 %
EOSINOPHIL NFR BLD AUTO: 3.1 %
ERYTHROCYTE [DISTWIDTH] IN BLOOD BY AUTOMATED COUNT: 12.8 % (ref 11.5–17)
ERYTHROCYTE [DISTWIDTH] IN BLOOD BY AUTOMATED COUNT: 13.1 % (ref 11.5–17)
FIO2: 100
FIO2: 60
FIO2: 60
FIO2: 80
GFR SERPLBLD CREATININE-BSD FMLA CKD-EPI: >60 ML/MIN/1.73/M2
GFR SERPLBLD CREATININE-BSD FMLA CKD-EPI: >60 ML/MIN/1.73/M2
GLOBULIN SER-MCNC: 2.7 GM/DL (ref 2.4–3.5)
GLUCOSE SERPL-MCNC: 102 MG/DL (ref 70–110)
GLUCOSE SERPL-MCNC: 106 MG/DL (ref 70–110)
GLUCOSE SERPL-MCNC: 106 MG/DL (ref 82–115)
GLUCOSE SERPL-MCNC: 115 MG/DL (ref 70–110)
GLUCOSE SERPL-MCNC: 90 MG/DL (ref 70–110)
GLUCOSE SERPL-MCNC: 97 MG/DL (ref 70–110)
GLUCOSE SERPL-MCNC: 97 MG/DL (ref 70–110)
GLUCOSE SERPL-MCNC: 98 MG/DL (ref 82–115)
HCO3 BLDA-SCNC: 20.2 MMOL/L (ref 22–26)
HCO3 BLDA-SCNC: 22.2 MMOL/L (ref 22–26)
HCO3 BLDA-SCNC: 27.1 MMOL/L (ref 22–26)
HCO3 UR-SCNC: 21.5 MMOL/L (ref 24–28)
HCO3 UR-SCNC: 22.1 MMOL/L (ref 24–28)
HCO3 UR-SCNC: 22.5 MMOL/L (ref 24–28)
HCO3 UR-SCNC: 23.2 MMOL/L (ref 24–28)
HCO3 UR-SCNC: 23.6 MMOL/L (ref 24–28)
HCO3 UR-SCNC: 27.7 MMOL/L (ref 24–28)
HCT VFR BLD AUTO: 25.5 % (ref 37–47)
HCT VFR BLD AUTO: 28 % (ref 37–47)
HCT VFR BLD AUTO: 40.5 % (ref 37–47)
HCT VFR BLD CALC: 21 %PCV (ref 36–54)
HCT VFR BLD CALC: 22 %PCV (ref 36–54)
HCT VFR BLD CALC: 22 %PCV (ref 36–54)
HCT VFR BLD CALC: 26 %PCV (ref 36–54)
HCT VFR BLD CALC: 32 %PCV (ref 36–54)
HCT VFR BLD CALC: 39 %PCV (ref 36–54)
HGB BLD-MCNC: 11 G/DL
HGB BLD-MCNC: 13 G/DL
HGB BLD-MCNC: 13.5 G/DL (ref 12–16)
HGB BLD-MCNC: 7 G/DL
HGB BLD-MCNC: 8 G/DL
HGB BLD-MCNC: 8 G/DL
HGB BLD-MCNC: 8.6 G/DL (ref 12–16)
HGB BLD-MCNC: 9 G/DL
HGB BLD-MCNC: 9.1 G/DL (ref 12–16)
IMM GRANULOCYTES # BLD AUTO: 0.01 X10(3)/MCL (ref 0–0.04)
IMM GRANULOCYTES # BLD AUTO: 0.03 X10(3)/MCL (ref 0–0.04)
IMM GRANULOCYTES NFR BLD AUTO: 0.2 %
IMM GRANULOCYTES NFR BLD AUTO: 0.5 %
INHALED O2 CONCENTRATION: 30 %
INHALED O2 CONCENTRATION: 40 %
INR PPP: 1.7
LEFT CCA DIST DIAS: 18 CM/S
LEFT CCA DIST SYS: 71 CM/S
LEFT CCA PROX DIAS: 20 CM/S
LEFT CCA PROX SYS: 85 CM/S
LEFT ECA DIAS: 7 CM/S
LEFT ECA SYS: 56 CM/S
LEFT ICA DIST DIAS: 25 CM/S
LEFT ICA DIST SYS: 82 CM/S
LEFT ICA MID DIAS: 18 CM/S
LEFT ICA MID SYS: 70 CM/S
LEFT ICA PROX DIAS: 18 CM/S
LEFT ICA PROX SYS: 72 CM/S
LEFT VERTEBRAL DIAS: 0 CM/S
LEFT VERTEBRAL SYS: 44 CM/S
LPM (OHS): 2
LYMPHOCYTES # BLD AUTO: 1.36 X10(3)/MCL (ref 0.6–4.6)
LYMPHOCYTES # BLD AUTO: 1.55 X10(3)/MCL (ref 0.6–4.6)
LYMPHOCYTES NFR BLD AUTO: 23 %
LYMPHOCYTES NFR BLD AUTO: 27.8 %
MAGNESIUM SERPL-MCNC: 2.1 MG/DL (ref 1.6–2.6)
MAGNESIUM SERPL-MCNC: 2.1 MG/DL (ref 1.6–2.6)
MCH RBC QN AUTO: 29.6 PG (ref 27–31)
MCH RBC QN AUTO: 29.9 PG (ref 27–31)
MCHC RBC AUTO-ENTMCNC: 32.5 G/DL (ref 33–36)
MCHC RBC AUTO-ENTMCNC: 33.3 G/DL (ref 33–36)
MCV RBC AUTO: 89.6 FL (ref 80–94)
MCV RBC AUTO: 91.2 FL (ref 80–94)
MECH RR (OHS): 20 B/MIN
METHGB MFR BLDA: 0.2 % (ref 0.4–1.5)
METHGB MFR BLDA: 0.8 % (ref 0.4–1.5)
METHGB MFR BLDA: 1 % (ref 0.4–1.5)
MODE (OHS): ABNORMAL
MODE (OHS): ABNORMAL
MONOCYTES # BLD AUTO: 0.17 X10(3)/MCL (ref 0.1–1.3)
MONOCYTES # BLD AUTO: 0.55 X10(3)/MCL (ref 0.1–1.3)
MONOCYTES NFR BLD AUTO: 2.9 %
MONOCYTES NFR BLD AUTO: 9.9 %
MRSA PCR SCRN (OHS): NOT DETECTED
NEUTROPHILS # BLD AUTO: 3.25 X10(3)/MCL (ref 2.1–9.2)
NEUTROPHILS # BLD AUTO: 4.21 X10(3)/MCL (ref 2.1–9.2)
NEUTROPHILS NFR BLD AUTO: 58.3 %
NEUTROPHILS NFR BLD AUTO: 71.3 %
NRBC BLD AUTO-RTO: 0 %
NRBC BLD AUTO-RTO: 0 %
O2 HB BLOOD GAS (OHS): 96.6 % (ref 94–97)
O2 HB BLOOD GAS (OHS): 96.9 % (ref 94–97)
O2 HB BLOOD GAS (OHS): 98.2 % (ref 94–97)
OHS CV CAROTID RIGHT ICA EDV HIGHEST: 14
OHS CV CAROTID ULTRASOUND LEFT ICA/CCA RATIO: 1.15
OHS CV CAROTID ULTRASOUND RIGHT ICA/CCA RATIO: 0.82
OHS CV PV CAROTID LEFT HIGHEST CCA: 85
OHS CV PV CAROTID LEFT HIGHEST ICA: 82
OHS CV PV CAROTID RIGHT HIGHEST CCA: 80
OHS CV PV CAROTID RIGHT HIGHEST ICA: 58
OHS CV US CAROTID LEFT HIGHEST EDV: 25
OXYGEN DEVICE BLOOD GAS (OHS): ABNORMAL
OXYHGB MFR BLDA: 13.7 G/DL (ref 12–16)
OXYHGB MFR BLDA: 8.9 G/DL (ref 12–16)
OXYHGB MFR BLDA: 9.5 G/DL (ref 12–16)
PCO2 BLDA: 35 MMHG (ref 35–45)
PCO2 BLDA: 36 MMHG (ref 35–45)
PCO2 BLDA: 38.3 MMHG (ref 35–45)
PCO2 BLDA: 39.1 MMHG (ref 35–45)
PCO2 BLDA: 40.6 MMHG (ref 35–45)
PCO2 BLDA: 43 MMHG (ref 35–45)
PCO2 BLDA: 48 MMHG (ref 35–45)
PCO2 BLDA: 49.3 MMHG (ref 35–45)
PCO2 BLDA: 52.2 MMHG (ref 35–45)
PEEP RESPIRATORY: 5 CMH2O
PH BLDA: 7.32 [PH] (ref 7.35–7.45)
PH BLDA: 7.36 [PH] (ref 7.35–7.45)
PH BLDA: 7.37 [PH] (ref 7.35–7.45)
PH SMN: 7.28 [PH] (ref 7.35–7.45)
PH SMN: 7.33 [PH] (ref 7.35–7.45)
PH SMN: 7.33 [PH] (ref 7.35–7.45)
PH SMN: 7.38 [PH] (ref 7.35–7.45)
PH SMN: 7.39 [PH] (ref 7.35–7.45)
PH SMN: 7.4 [PH] (ref 7.35–7.45)
PLATELET # BLD AUTO: 103 X10(3)/MCL (ref 130–400)
PLATELET # BLD AUTO: 166 X10(3)/MCL (ref 130–400)
PLATELETS.RETICULATED NFR BLD AUTO: 1.9 % (ref 0.9–11.2)
PMV BLD AUTO: 10 FL (ref 7.4–10.4)
PMV BLD AUTO: 9.9 FL (ref 7.4–10.4)
PO2 BLDA: 110 MMHG (ref 80–100)
PO2 BLDA: 111 MMHG (ref 80–100)
PO2 BLDA: 121 MMHG (ref 80–100)
PO2 BLDA: 314 MMHG (ref 80–100)
PO2 BLDA: 320 MMHG (ref 80–100)
PO2 BLDA: 381 MMHG (ref 80–100)
PO2 BLDA: 41 MMHG (ref 40–60)
PO2 BLDA: 52 MMHG (ref 80–100)
PO2 BLDA: 52 MMHG (ref 80–100)
POC BE: -1 MMOL/L
POC BE: -3 MMOL/L
POC BE: -3 MMOL/L
POC BE: -4 MMOL/L
POC BE: -4 MMOL/L
POC BE: 2 MMOL/L
POC IONIZED CALCIUM: 0.94 MMOL/L (ref 1.06–1.42)
POC IONIZED CALCIUM: 1.09 MMOL/L (ref 1.06–1.42)
POC IONIZED CALCIUM: 1.2 MMOL/L (ref 1.06–1.42)
POC IONIZED CALCIUM: 1.33 MMOL/L (ref 1.06–1.42)
POC IONIZED CALCIUM: 1.38 MMOL/L (ref 1.06–1.42)
POC IONIZED CALCIUM: 1.6 MMOL/L (ref 1.06–1.42)
POC SATURATED O2: 100 % (ref 95–100)
POC SATURATED O2: 75 % (ref 95–100)
POC SATURATED O2: 82 % (ref 95–100)
POC SATURATED O2: 84 % (ref 95–100)
POC TCO2: 23 MMOL/L (ref 23–27)
POC TCO2: 23 MMOL/L (ref 23–27)
POC TCO2: 24 MMOL/L (ref 23–27)
POC TCO2: 25 MMOL/L (ref 23–27)
POC TCO2: 25 MMOL/L (ref 24–29)
POC TCO2: 29 MMOL/L (ref 23–27)
POCT GLUCOSE: 124 MG/DL (ref 70–110)
POCT GLUCOSE: 138 MG/DL (ref 70–110)
POCT GLUCOSE: 157 MG/DL (ref 70–110)
POCT GLUCOSE: 159 MG/DL (ref 70–110)
POCT GLUCOSE: 180 MG/DL (ref 70–110)
POCT GLUCOSE: 191 MG/DL (ref 70–110)
POCT GLUCOSE: 199 MG/DL (ref 70–110)
POCT GLUCOSE: 221 MG/DL (ref 70–110)
POCT GLUCOSE: 94 MG/DL (ref 70–110)
POTASSIUM BLD-SCNC: 3.7 MMOL/L (ref 3.5–5.1)
POTASSIUM BLD-SCNC: 4 MMOL/L (ref 3.5–5.1)
POTASSIUM BLD-SCNC: 4.2 MMOL/L (ref 3.5–5.1)
POTASSIUM BLD-SCNC: 4.7 MMOL/L (ref 3.5–5.1)
POTASSIUM BLD-SCNC: 4.9 MMOL/L (ref 3.5–5.1)
POTASSIUM BLD-SCNC: 6 MMOL/L (ref 3.5–5.1)
POTASSIUM BLOOD GAS (OHS): 3.4 MMOL/L (ref 3.5–5)
POTASSIUM BLOOD GAS (OHS): 3.4 MMOL/L (ref 3.5–5)
POTASSIUM BLOOD GAS (OHS): 3.8 MMOL/L (ref 3.5–5)
POTASSIUM SERPL-SCNC: 3.4 MMOL/L (ref 3.5–5.1)
POTASSIUM SERPL-SCNC: 4 MMOL/L (ref 3.5–5.1)
POTASSIUM SERPL-SCNC: 4.1 MMOL/L (ref 3.5–5.1)
PROT SERPL-MCNC: 6.3 GM/DL (ref 5.8–7.6)
PROTHROMBIN TIME: 19.5 SECONDS (ref 12.5–14.5)
PS (OHS): 10 CMH2O
PS (OHS): 10 CMH2O
RBC # BLD AUTO: 3.07 X10(6)/MCL (ref 4.2–5.4)
RBC # BLD AUTO: 4.52 X10(6)/MCL (ref 4.2–5.4)
RIGHT CCA DIST DIAS: 15 CM/S
RIGHT CCA DIST SYS: 71 CM/S
RIGHT CCA PROX DIAS: 9 CM/S
RIGHT CCA PROX SYS: 80 CM/S
RIGHT ECA DIAS: 11 CM/S
RIGHT ECA SYS: 86 CM/S
RIGHT ICA MID DIAS: 13 CM/S
RIGHT ICA MID SYS: 49 CM/S
RIGHT ICA PROX DIAS: 14 CM/S
RIGHT ICA PROX SYS: 58 CM/S
RIGHT VERTEBRAL DIAS: 9.94 CM/S
RIGHT VERTEBRAL SYS: 40.2 CM/S
SAMPLE SITE BLOOD GAS (OHS): ABNORMAL
SAMPLE: ABNORMAL
SAO2 % BLDA: 97.9 %
SAO2 % BLDA: 98.2 %
SAO2 % BLDA: 98.6 %
SODIUM BLD-SCNC: 143 MMOL/L (ref 136–145)
SODIUM BLD-SCNC: 143 MMOL/L (ref 136–145)
SODIUM BLD-SCNC: 144 MMOL/L (ref 136–145)
SODIUM BLD-SCNC: 145 MMOL/L (ref 136–145)
SODIUM BLD-SCNC: 145 MMOL/L (ref 136–145)
SODIUM BLD-SCNC: 146 MMOL/L (ref 136–145)
SODIUM BLOOD GAS (OHS): 136 MMOL/L (ref 137–145)
SODIUM BLOOD GAS (OHS): 137 MMOL/L (ref 137–145)
SODIUM BLOOD GAS (OHS): 138 MMOL/L (ref 137–145)
SODIUM SERPL-SCNC: 141 MMOL/L (ref 136–145)
SODIUM SERPL-SCNC: 141 MMOL/L (ref 136–145)
SPONT+MECH VT ON VENT: 500 ML
UNIT NUMBER: NORMAL
WBC # SPEC AUTO: 5.57 X10(3)/MCL (ref 4.5–11.5)
WBC # SPEC AUTO: 5.91 X10(3)/MCL (ref 4.5–11.5)

## 2024-05-09 PROCEDURE — 33508 ENDOSCOPIC VEIN HARVEST: CPT | Mod: AS,59,, | Performed by: PHYSICIAN ASSISTANT

## 2024-05-09 PROCEDURE — 99900035 HC TECH TIME PER 15 MIN (STAT)

## 2024-05-09 PROCEDURE — C1894 INTRO/SHEATH, NON-LASER: HCPCS | Performed by: THORACIC SURGERY (CARDIOTHORACIC VASCULAR SURGERY)

## 2024-05-09 PROCEDURE — 99024 POSTOP FOLLOW-UP VISIT: CPT | Mod: ,,, | Performed by: PHYSICIAN ASSISTANT

## 2024-05-09 PROCEDURE — 36415 COLL VENOUS BLD VENIPUNCTURE: CPT

## 2024-05-09 PROCEDURE — 33517 CABG ARTERY-VEIN SINGLE: CPT | Mod: ,,, | Performed by: THORACIC SURGERY (CARDIOTHORACIC VASCULAR SURGERY)

## 2024-05-09 PROCEDURE — 94002 VENT MGMT INPAT INIT DAY: CPT

## 2024-05-09 PROCEDURE — 37000009 HC ANESTHESIA EA ADD 15 MINS: Performed by: THORACIC SURGERY (CARDIOTHORACIC VASCULAR SURGERY)

## 2024-05-09 PROCEDURE — 37000008 HC ANESTHESIA 1ST 15 MINUTES: Performed by: THORACIC SURGERY (CARDIOTHORACIC VASCULAR SURGERY)

## 2024-05-09 PROCEDURE — 63600175 PHARM REV CODE 636 W HCPCS: Performed by: PHYSICIAN ASSISTANT

## 2024-05-09 PROCEDURE — 83735 ASSAY OF MAGNESIUM: CPT

## 2024-05-09 PROCEDURE — 87641 MR-STAPH DNA AMP PROBE: CPT | Performed by: THORACIC SURGERY (CARDIOTHORACIC VASCULAR SURGERY)

## 2024-05-09 PROCEDURE — 63600175 PHARM REV CODE 636 W HCPCS: Performed by: INTERNAL MEDICINE

## 2024-05-09 PROCEDURE — P9016 RBC LEUKOCYTES REDUCED: HCPCS

## 2024-05-09 PROCEDURE — 021009W BYPASS CORONARY ARTERY, ONE ARTERY FROM AORTA WITH AUTOLOGOUS VENOUS TISSUE, OPEN APPROACH: ICD-10-PCS | Performed by: THORACIC SURGERY (CARDIOTHORACIC VASCULAR SURGERY)

## 2024-05-09 PROCEDURE — 25000003 PHARM REV CODE 250: Performed by: NURSE PRACTITIONER

## 2024-05-09 PROCEDURE — 27800903 OPTIME MED/SURG SUP & DEVICES OTHER IMPLANTS: Performed by: THORACIC SURGERY (CARDIOTHORACIC VASCULAR SURGERY)

## 2024-05-09 PROCEDURE — 82803 BLOOD GASES ANY COMBINATION: CPT

## 2024-05-09 PROCEDURE — 63600175 PHARM REV CODE 636 W HCPCS

## 2024-05-09 PROCEDURE — C1887 CATHETER, GUIDING: HCPCS | Performed by: THORACIC SURGERY (CARDIOTHORACIC VASCULAR SURGERY)

## 2024-05-09 PROCEDURE — 33533 CABG ARTERIAL SINGLE: CPT | Mod: ,,, | Performed by: THORACIC SURGERY (CARDIOTHORACIC VASCULAR SURGERY)

## 2024-05-09 PROCEDURE — 80053 COMPREHEN METABOLIC PANEL: CPT

## 2024-05-09 PROCEDURE — 33533 CABG ARTERIAL SINGLE: CPT | Mod: AS,,, | Performed by: PHYSICIAN ASSISTANT

## 2024-05-09 PROCEDURE — S5010 5% DEXTROSE AND 0.45% SALINE: HCPCS | Performed by: PHYSICIAN ASSISTANT

## 2024-05-09 PROCEDURE — 25000003 PHARM REV CODE 250: Performed by: PHYSICIAN ASSISTANT

## 2024-05-09 PROCEDURE — 36000713 HC OR TIME LEV V EA ADD 15 MIN: Performed by: THORACIC SURGERY (CARDIOTHORACIC VASCULAR SURGERY)

## 2024-05-09 PROCEDURE — 99900031 HC PATIENT EDUCATION (STAT)

## 2024-05-09 PROCEDURE — 36600 WITHDRAWAL OF ARTERIAL BLOOD: CPT | Performed by: THORACIC SURGERY (CARDIOTHORACIC VASCULAR SURGERY)

## 2024-05-09 PROCEDURE — 36620 INSERTION CATHETER ARTERY: CPT

## 2024-05-09 PROCEDURE — 85610 PROTHROMBIN TIME: CPT | Performed by: THORACIC SURGERY (CARDIOTHORACIC VASCULAR SURGERY)

## 2024-05-09 PROCEDURE — 37799 UNLISTED PX VASCULAR SURGERY: CPT

## 2024-05-09 PROCEDURE — 85018 HEMOGLOBIN: CPT | Performed by: THORACIC SURGERY (CARDIOTHORACIC VASCULAR SURGERY)

## 2024-05-09 PROCEDURE — 36000712 HC OR TIME LEV V 1ST 15 MIN: Performed by: THORACIC SURGERY (CARDIOTHORACIC VASCULAR SURGERY)

## 2024-05-09 PROCEDURE — 27201423 OPTIME MED/SURG SUP & DEVICES STERILE SUPPLY: Performed by: THORACIC SURGERY (CARDIOTHORACIC VASCULAR SURGERY)

## 2024-05-09 PROCEDURE — 06BQ4ZZ EXCISION OF LEFT SAPHENOUS VEIN, PERCUTANEOUS ENDOSCOPIC APPROACH: ICD-10-PCS | Performed by: THORACIC SURGERY (CARDIOTHORACIC VASCULAR SURGERY)

## 2024-05-09 PROCEDURE — 33508 ENDOSCOPIC VEIN HARVEST: CPT | Mod: 59,,, | Performed by: THORACIC SURGERY (CARDIOTHORACIC VASCULAR SURGERY)

## 2024-05-09 PROCEDURE — 94760 N-INVAS EAR/PLS OXIMETRY 1: CPT | Mod: XB

## 2024-05-09 PROCEDURE — 27100171 HC OXYGEN HIGH FLOW UP TO 24 HOURS

## 2024-05-09 PROCEDURE — 85025 COMPLETE CBC W/AUTO DIFF WBC: CPT | Performed by: THORACIC SURGERY (CARDIOTHORACIC VASCULAR SURGERY)

## 2024-05-09 PROCEDURE — 27200966 HC CLOSED SUCTION SYSTEM

## 2024-05-09 PROCEDURE — 85025 COMPLETE CBC W/AUTO DIFF WBC: CPT

## 2024-05-09 PROCEDURE — C1729 CATH, DRAINAGE: HCPCS | Performed by: THORACIC SURGERY (CARDIOTHORACIC VASCULAR SURGERY)

## 2024-05-09 PROCEDURE — 85730 THROMBOPLASTIN TIME PARTIAL: CPT | Performed by: THORACIC SURGERY (CARDIOTHORACIC VASCULAR SURGERY)

## 2024-05-09 PROCEDURE — P9045 ALBUMIN (HUMAN), 5%, 250 ML: HCPCS | Mod: JZ,JG | Performed by: PHYSICIAN ASSISTANT

## 2024-05-09 PROCEDURE — 02100Z9 BYPASS CORONARY ARTERY, ONE ARTERY FROM LEFT INTERNAL MAMMARY, OPEN APPROACH: ICD-10-PCS | Performed by: THORACIC SURGERY (CARDIOTHORACIC VASCULAR SURGERY)

## 2024-05-09 PROCEDURE — 25000003 PHARM REV CODE 250: Performed by: INTERNAL MEDICINE

## 2024-05-09 PROCEDURE — 85014 HEMATOCRIT: CPT | Performed by: THORACIC SURGERY (CARDIOTHORACIC VASCULAR SURGERY)

## 2024-05-09 PROCEDURE — 33517 CABG ARTERY-VEIN SINGLE: CPT | Mod: AS,,, | Performed by: PHYSICIAN ASSISTANT

## 2024-05-09 PROCEDURE — 20000000 HC ICU ROOM

## 2024-05-09 PROCEDURE — 84132 ASSAY OF SERUM POTASSIUM: CPT | Performed by: THORACIC SURGERY (CARDIOTHORACIC VASCULAR SURGERY)

## 2024-05-09 PROCEDURE — 25000003 PHARM REV CODE 250

## 2024-05-09 PROCEDURE — 83735 ASSAY OF MAGNESIUM: CPT | Performed by: THORACIC SURGERY (CARDIOTHORACIC VASCULAR SURGERY)

## 2024-05-09 PROCEDURE — D9220A PRA ANESTHESIA: Mod: ,,,

## 2024-05-09 PROCEDURE — 36620 INSERTION CATHETER ARTERY: CPT | Mod: 59,,, | Performed by: ANESTHESIOLOGY

## 2024-05-09 PROCEDURE — 5A1221Z PERFORMANCE OF CARDIAC OUTPUT, CONTINUOUS: ICD-10-PCS | Performed by: THORACIC SURGERY (CARDIOTHORACIC VASCULAR SURGERY)

## 2024-05-09 PROCEDURE — 02L70CK OCCLUSION OF LEFT ATRIAL APPENDAGE WITH EXTRALUMINAL DEVICE, OPEN APPROACH: ICD-10-PCS | Performed by: THORACIC SURGERY (CARDIOTHORACIC VASCULAR SURGERY)

## 2024-05-09 PROCEDURE — 63600175 PHARM REV CODE 636 W HCPCS: Performed by: THORACIC SURGERY (CARDIOTHORACIC VASCULAR SURGERY)

## 2024-05-09 DEVICE — IMPLANTABLE DEVICE: Type: IMPLANTABLE DEVICE | Site: HEART | Status: FUNCTIONAL

## 2024-05-09 RX ORDER — EPHEDRINE SULFATE 50 MG/ML
INJECTION, SOLUTION INTRAVENOUS
Status: DISCONTINUED | OUTPATIENT
Start: 2024-05-09 | End: 2024-05-09

## 2024-05-09 RX ORDER — POTASSIUM CHLORIDE 14.9 MG/ML
60 INJECTION INTRAVENOUS
Status: DISCONTINUED | OUTPATIENT
Start: 2024-05-09 | End: 2024-05-12

## 2024-05-09 RX ORDER — FAMOTIDINE 10 MG/ML
20 INJECTION INTRAVENOUS DAILY
Status: DISCONTINUED | OUTPATIENT
Start: 2024-05-09 | End: 2024-05-11

## 2024-05-09 RX ORDER — DOCUSATE SODIUM 100 MG/1
100 CAPSULE, LIQUID FILLED ORAL 2 TIMES DAILY
Status: DISCONTINUED | OUTPATIENT
Start: 2024-05-10 | End: 2024-05-14 | Stop reason: HOSPADM

## 2024-05-09 RX ORDER — MORPHINE SULFATE 4 MG/ML
4 INJECTION, SOLUTION INTRAMUSCULAR; INTRAVENOUS EVERY 4 HOURS PRN
Status: DISCONTINUED | OUTPATIENT
Start: 2024-05-09 | End: 2024-05-14 | Stop reason: HOSPADM

## 2024-05-09 RX ORDER — POTASSIUM CHLORIDE 14.9 MG/ML
20 INJECTION INTRAVENOUS
Status: DISCONTINUED | OUTPATIENT
Start: 2024-05-09 | End: 2024-05-12

## 2024-05-09 RX ORDER — CALCIUM CHLORIDE INJECTION 100 MG/ML
INJECTION, SOLUTION INTRAVENOUS
Status: DISCONTINUED | OUTPATIENT
Start: 2024-05-09 | End: 2024-05-09 | Stop reason: HOSPADM

## 2024-05-09 RX ORDER — HEPARIN SODIUM 1000 [USP'U]/ML
INJECTION, SOLUTION INTRAVENOUS; SUBCUTANEOUS
Status: DISCONTINUED | OUTPATIENT
Start: 2024-05-09 | End: 2024-05-09

## 2024-05-09 RX ORDER — PROPOFOL 10 MG/ML
VIAL (ML) INTRAVENOUS
Status: DISCONTINUED | OUTPATIENT
Start: 2024-05-09 | End: 2024-05-09

## 2024-05-09 RX ORDER — HEPARIN 100 UNIT/ML
5 SYRINGE INTRAVENOUS
Status: DISCONTINUED | OUTPATIENT
Start: 2024-05-09 | End: 2024-05-09 | Stop reason: HOSPADM

## 2024-05-09 RX ORDER — ALBUMIN HUMAN 50 G/1000ML
12.5 SOLUTION INTRAVENOUS
Status: DISCONTINUED | OUTPATIENT
Start: 2024-05-09 | End: 2024-05-14 | Stop reason: HOSPADM

## 2024-05-09 RX ORDER — MIDAZOLAM HYDROCHLORIDE 1 MG/ML
INJECTION INTRAMUSCULAR; INTRAVENOUS
Status: DISCONTINUED | OUTPATIENT
Start: 2024-05-09 | End: 2024-05-09

## 2024-05-09 RX ORDER — OXYCODONE HYDROCHLORIDE 10 MG/1
10 TABLET ORAL EVERY 4 HOURS PRN
Status: DISCONTINUED | OUTPATIENT
Start: 2024-05-09 | End: 2024-05-14 | Stop reason: HOSPADM

## 2024-05-09 RX ORDER — ROCURONIUM BROMIDE 10 MG/ML
INJECTION, SOLUTION INTRAVENOUS
Status: DISCONTINUED | OUTPATIENT
Start: 2024-05-09 | End: 2024-05-09

## 2024-05-09 RX ORDER — ETOMIDATE 2 MG/ML
INJECTION INTRAVENOUS
Status: DISCONTINUED | OUTPATIENT
Start: 2024-05-09 | End: 2024-05-09

## 2024-05-09 RX ORDER — CALCIUM GLUCONATE 20 MG/ML
1 INJECTION, SOLUTION INTRAVENOUS
Status: DISCONTINUED | OUTPATIENT
Start: 2024-05-09 | End: 2024-05-14 | Stop reason: HOSPADM

## 2024-05-09 RX ORDER — PROTAMINE SULFATE 10 MG/ML
INJECTION, SOLUTION INTRAVENOUS
Status: DISCONTINUED | OUTPATIENT
Start: 2024-05-09 | End: 2024-05-09

## 2024-05-09 RX ORDER — METOCLOPRAMIDE HYDROCHLORIDE 5 MG/ML
5 INJECTION INTRAMUSCULAR; INTRAVENOUS EVERY 6 HOURS PRN
Status: DISCONTINUED | OUTPATIENT
Start: 2024-05-09 | End: 2024-05-14 | Stop reason: HOSPADM

## 2024-05-09 RX ORDER — DEXMEDETOMIDINE HYDROCHLORIDE 4 UG/ML
0-1.4 INJECTION, SOLUTION INTRAVENOUS CONTINUOUS
Status: DISCONTINUED | OUTPATIENT
Start: 2024-05-09 | End: 2024-05-12

## 2024-05-09 RX ORDER — ONDANSETRON HYDROCHLORIDE 2 MG/ML
4 INJECTION, SOLUTION INTRAVENOUS EVERY 4 HOURS PRN
Status: DISCONTINUED | OUTPATIENT
Start: 2024-05-09 | End: 2024-05-14 | Stop reason: HOSPADM

## 2024-05-09 RX ORDER — CALCIUM GLUCONATE 20 MG/ML
2 INJECTION, SOLUTION INTRAVENOUS
Status: DISCONTINUED | OUTPATIENT
Start: 2024-05-09 | End: 2024-05-14 | Stop reason: HOSPADM

## 2024-05-09 RX ORDER — MAGNESIUM SULFATE HEPTAHYDRATE 40 MG/ML
4 INJECTION, SOLUTION INTRAVENOUS
Status: DISCONTINUED | OUTPATIENT
Start: 2024-05-09 | End: 2024-05-14 | Stop reason: HOSPADM

## 2024-05-09 RX ORDER — PHENYLEPHRINE HYDROCHLORIDE 10 MG/ML
INJECTION INTRAVENOUS
Status: DISCONTINUED | OUTPATIENT
Start: 2024-05-09 | End: 2024-05-09

## 2024-05-09 RX ORDER — LIDOCAINE HYDROCHLORIDE 20 MG/ML
INJECTION, SOLUTION EPIDURAL; INFILTRATION; INTRACAUDAL; PERINEURAL
Status: DISCONTINUED | OUTPATIENT
Start: 2024-05-09 | End: 2024-05-09

## 2024-05-09 RX ORDER — FENTANYL CITRATE 50 UG/ML
INJECTION, SOLUTION INTRAMUSCULAR; INTRAVENOUS
Status: DISCONTINUED | OUTPATIENT
Start: 2024-05-09 | End: 2024-05-09

## 2024-05-09 RX ORDER — FOLIC ACID 1 MG/1
1 TABLET ORAL DAILY
Status: DISCONTINUED | OUTPATIENT
Start: 2024-05-10 | End: 2024-05-14 | Stop reason: HOSPADM

## 2024-05-09 RX ORDER — MAGNESIUM SULFATE HEPTAHYDRATE 40 MG/ML
2 INJECTION, SOLUTION INTRAVENOUS
Status: DISCONTINUED | OUTPATIENT
Start: 2024-05-09 | End: 2024-05-14 | Stop reason: HOSPADM

## 2024-05-09 RX ORDER — HEPARIN SODIUM 5000 [USP'U]/ML
INJECTION, SOLUTION INTRAVENOUS; SUBCUTANEOUS
Status: DISCONTINUED | OUTPATIENT
Start: 2024-05-09 | End: 2024-05-09 | Stop reason: HOSPADM

## 2024-05-09 RX ORDER — ACETAMINOPHEN 650 MG/20.3ML
650 LIQUID ORAL EVERY 6 HOURS PRN
Status: DISCONTINUED | OUTPATIENT
Start: 2024-05-09 | End: 2024-05-09

## 2024-05-09 RX ORDER — CEFAZOLIN SODIUM 2 G/50ML
2 SOLUTION INTRAVENOUS
Status: DISCONTINUED | OUTPATIENT
Start: 2024-05-09 | End: 2024-05-09

## 2024-05-09 RX ORDER — METOPROLOL TARTRATE 1 MG/ML
INJECTION, SOLUTION INTRAVENOUS
Status: DISCONTINUED | OUTPATIENT
Start: 2024-05-09 | End: 2024-05-09

## 2024-05-09 RX ORDER — SUCRALFATE 1 G/1
1 TABLET ORAL
Status: DISCONTINUED | OUTPATIENT
Start: 2024-05-10 | End: 2024-05-14 | Stop reason: HOSPADM

## 2024-05-09 RX ORDER — LACTULOSE 10 G/15ML
20 SOLUTION ORAL EVERY 6 HOURS PRN
Status: DISCONTINUED | OUTPATIENT
Start: 2024-05-09 | End: 2024-05-14 | Stop reason: HOSPADM

## 2024-05-09 RX ORDER — CEFAZOLIN SODIUM 2 G/50ML
2 SOLUTION INTRAVENOUS
Qty: 100 ML | Refills: 0 | Status: COMPLETED | OUTPATIENT
Start: 2024-05-09 | End: 2024-05-10

## 2024-05-09 RX ORDER — METOPROLOL TARTRATE 25 MG/1
12.5 TABLET ORAL 2 TIMES DAILY
Status: DISCONTINUED | OUTPATIENT
Start: 2024-05-10 | End: 2024-05-12

## 2024-05-09 RX ORDER — ASPIRIN 81 MG/1
81 TABLET ORAL DAILY
Status: DISCONTINUED | OUTPATIENT
Start: 2024-05-10 | End: 2024-05-14 | Stop reason: HOSPADM

## 2024-05-09 RX ORDER — MUPIROCIN 20 MG/G
OINTMENT TOPICAL 2 TIMES DAILY
Status: COMPLETED | OUTPATIENT
Start: 2024-05-09 | End: 2024-05-11

## 2024-05-09 RX ORDER — CALCIUM GLUCONATE 20 MG/ML
3 INJECTION, SOLUTION INTRAVENOUS
Status: DISCONTINUED | OUTPATIENT
Start: 2024-05-09 | End: 2024-05-14 | Stop reason: HOSPADM

## 2024-05-09 RX ORDER — SUCCINYLCHOLINE CHLORIDE 20 MG/ML
INJECTION INTRAMUSCULAR; INTRAVENOUS
Status: DISCONTINUED | OUTPATIENT
Start: 2024-05-09 | End: 2024-05-09

## 2024-05-09 RX ORDER — DEXTROSE MONOHYDRATE AND SODIUM CHLORIDE 5; .45 G/100ML; G/100ML
INJECTION, SOLUTION INTRAVENOUS CONTINUOUS
Status: DISCONTINUED | OUTPATIENT
Start: 2024-05-09 | End: 2024-05-12

## 2024-05-09 RX ORDER — PAPAVERINE HYDROCHLORIDE 30 MG/ML
INJECTION INTRAMUSCULAR; INTRAVENOUS
Status: DISCONTINUED | OUTPATIENT
Start: 2024-05-09 | End: 2024-05-09 | Stop reason: HOSPADM

## 2024-05-09 RX ORDER — TRANEXAMIC ACID 100 MG/ML
INJECTION, SOLUTION INTRAVENOUS
Status: DISCONTINUED | OUTPATIENT
Start: 2024-05-09 | End: 2024-05-09

## 2024-05-09 RX ORDER — HYDROCODONE BITARTRATE AND ACETAMINOPHEN 5; 325 MG/1; MG/1
1 TABLET ORAL EVERY 4 HOURS PRN
Status: DISCONTINUED | OUTPATIENT
Start: 2024-05-09 | End: 2024-05-14 | Stop reason: HOSPADM

## 2024-05-09 RX ORDER — ONDANSETRON 2 MG/ML
INJECTION INTRAMUSCULAR; INTRAVENOUS
Status: DISCONTINUED | OUTPATIENT
Start: 2024-05-09 | End: 2024-05-09

## 2024-05-09 RX ORDER — POTASSIUM CHLORIDE 14.9 MG/ML
40 INJECTION INTRAVENOUS
Status: DISCONTINUED | OUTPATIENT
Start: 2024-05-09 | End: 2024-05-12

## 2024-05-09 RX ORDER — LOPERAMIDE HYDROCHLORIDE 2 MG/1
2 CAPSULE ORAL CONTINUOUS PRN
Status: DISCONTINUED | OUTPATIENT
Start: 2024-05-09 | End: 2024-05-14 | Stop reason: HOSPADM

## 2024-05-09 RX ORDER — ACETAMINOPHEN 10 MG/ML
1000 INJECTION, SOLUTION INTRAVENOUS EVERY 8 HOURS
Status: DISPENSED | OUTPATIENT
Start: 2024-05-09 | End: 2024-05-10

## 2024-05-09 RX ORDER — HYDROCODONE BITARTRATE AND ACETAMINOPHEN 500; 5 MG/1; MG/1
TABLET ORAL
Status: DISCONTINUED | OUTPATIENT
Start: 2024-05-09 | End: 2024-05-14 | Stop reason: HOSPADM

## 2024-05-09 RX ADMIN — ONDANSETRON 250 MG: 2 INJECTION INTRAMUSCULAR; INTRAVENOUS at 01:05

## 2024-05-09 RX ADMIN — DEXTROSE MONOHYDRATE 1.5 G: 5 INJECTION INTRAVENOUS at 11:05

## 2024-05-09 RX ADMIN — ONDANSETRON 4 MG: 2 INJECTION INTRAMUSCULAR; INTRAVENOUS at 10:05

## 2024-05-09 RX ADMIN — TRANEXAMIC ACID 780 MG: 100 INJECTION, SOLUTION INTRAVENOUS at 01:05

## 2024-05-09 RX ADMIN — FENTANYL CITRATE 100 MCG: 50 INJECTION, SOLUTION INTRAMUSCULAR; INTRAVENOUS at 01:05

## 2024-05-09 RX ADMIN — ROCURONIUM BROMIDE 25 MG: 10 SOLUTION INTRAVENOUS at 11:05

## 2024-05-09 RX ADMIN — ROCURONIUM BROMIDE 25 MG: 10 SOLUTION INTRAVENOUS at 01:05

## 2024-05-09 RX ADMIN — FENTANYL CITRATE 100 MCG: 50 INJECTION, SOLUTION INTRAMUSCULAR; INTRAVENOUS at 12:05

## 2024-05-09 RX ADMIN — MIDAZOLAM HYDROCHLORIDE 2 MG: 1 INJECTION, SOLUTION INTRAMUSCULAR; INTRAVENOUS at 11:05

## 2024-05-09 RX ADMIN — MORPHINE SULFATE 4 MG: 4 INJECTION INTRAVENOUS at 06:05

## 2024-05-09 RX ADMIN — ROCURONIUM BROMIDE 5 MG: 10 SOLUTION INTRAVENOUS at 11:05

## 2024-05-09 RX ADMIN — PHENYLEPHRINE HYDROCHLORIDE 200 MCG: 10 INJECTION INTRAVENOUS at 12:05

## 2024-05-09 RX ADMIN — LIDOCAINE HYDROCHLORIDE 80 MG: 20 INJECTION, SOLUTION INTRAVENOUS at 11:05

## 2024-05-09 RX ADMIN — ATORVASTATIN CALCIUM 80 MG: 40 TABLET, FILM COATED ORAL at 10:05

## 2024-05-09 RX ADMIN — FENTANYL CITRATE 200 MCG: 50 INJECTION, SOLUTION INTRAMUSCULAR; INTRAVENOUS at 11:05

## 2024-05-09 RX ADMIN — DEXMEDETOMIDINE HYDROCHLORIDE 0.6 MCG/KG/HR: 400 INJECTION INTRAVENOUS at 01:05

## 2024-05-09 RX ADMIN — MIDAZOLAM HYDROCHLORIDE 1.5 MG: 1 INJECTION, SOLUTION INTRAMUSCULAR; INTRAVENOUS at 01:05

## 2024-05-09 RX ADMIN — EPINEPHRINE 0.02 MCG/KG/MIN: 1 INJECTION INTRAMUSCULAR; INTRAVENOUS; SUBCUTANEOUS at 01:05

## 2024-05-09 RX ADMIN — METOPROLOL TARTRATE 1 MG: 1 INJECTION, SOLUTION INTRAVENOUS at 11:05

## 2024-05-09 RX ADMIN — ETOMIDATE 16 MG: 2 INJECTION INTRAVENOUS at 11:05

## 2024-05-09 RX ADMIN — ALBUMIN (HUMAN) 12.5 G: 12.5 INJECTION, SOLUTION INTRAVENOUS at 04:05

## 2024-05-09 RX ADMIN — DEXTROSE AND SODIUM CHLORIDE: 5; 450 INJECTION, SOLUTION INTRAVENOUS at 11:05

## 2024-05-09 RX ADMIN — SUGAMMADEX 200 MG: 100 INJECTION, SOLUTION INTRAVENOUS at 02:05

## 2024-05-09 RX ADMIN — MUPIROCIN: 20 OINTMENT TOPICAL at 08:05

## 2024-05-09 RX ADMIN — ROCURONIUM BROMIDE 50 MG: 10 SOLUTION INTRAVENOUS at 12:05

## 2024-05-09 RX ADMIN — DEXTROSE AND SODIUM CHLORIDE: 5; 450 INJECTION, SOLUTION INTRAVENOUS at 03:05

## 2024-05-09 RX ADMIN — TRANEXAMIC ACID 780 MG: 100 INJECTION, SOLUTION INTRAVENOUS at 11:05

## 2024-05-09 RX ADMIN — EPHEDRINE SULFATE 5 MG: 50 INJECTION INTRAVENOUS at 12:05

## 2024-05-09 RX ADMIN — SODIUM CHLORIDE: 9 INJECTION, SOLUTION INTRAVENOUS at 11:05

## 2024-05-09 RX ADMIN — PROTAMINE SULFATE 350 MG: 10 INJECTION, SOLUTION INTRAVENOUS at 01:05

## 2024-05-09 RX ADMIN — PHENYLEPHRINE HYDROCHLORIDE 100 MCG: 10 INJECTION INTRAVENOUS at 02:05

## 2024-05-09 RX ADMIN — ROCURONIUM BROMIDE 25 MG: 10 SOLUTION INTRAVENOUS at 12:05

## 2024-05-09 RX ADMIN — POTASSIUM CHLORIDE 40 MEQ: 14.9 INJECTION, SOLUTION INTRAVENOUS at 08:05

## 2024-05-09 RX ADMIN — CEFAZOLIN SODIUM 2 G: 2 SOLUTION INTRAVENOUS at 11:05

## 2024-05-09 RX ADMIN — ACETAMINOPHEN 1000 MG: 10 INJECTION, SOLUTION INTRAVENOUS at 09:05

## 2024-05-09 RX ADMIN — INSULIN HUMAN 2 UNITS/HR: 1 INJECTION, SOLUTION INTRAVENOUS at 03:05

## 2024-05-09 RX ADMIN — PROPOFOL 30 MG: 10 INJECTION, EMULSION INTRAVENOUS at 11:05

## 2024-05-09 RX ADMIN — HEPARIN SODIUM 25000 UNITS: 1000 INJECTION, SOLUTION INTRAVENOUS; SUBCUTANEOUS at 12:05

## 2024-05-09 RX ADMIN — MIDAZOLAM HYDROCHLORIDE 1.5 MG: 1 INJECTION, SOLUTION INTRAMUSCULAR; INTRAVENOUS at 12:05

## 2024-05-09 RX ADMIN — SUCCINYLCHOLINE CHLORIDE 160 MG: 20 INJECTION, SOLUTION INTRAMUSCULAR; INTRAVENOUS at 11:05

## 2024-05-09 NOTE — ANESTHESIA PROCEDURE NOTES
KASHIF    Diagnosis: Coronary artery disease  Patient location during procedure: OR  Timeout: 5/9/2024 11:50 AM  Procedure end time: 5/9/2024 11:59 AM  Surgery related to: CABG  Exam type: Baseline    Staffing  Performed: anesthesiologist     Anesthesiologist: Marcio Flowers MD        Anesthesiologist Present  Yes      Setup & Induction  Patient preparation: bite block inserted  Probe Insertion: easy  Exam: limitedDoppler Echo: 2D, color flow mapping and pulse wave Doppler.  Exam     Left Heart  Left Atruim: normal and normal (men 3.0-4.0; women 2.7-3.8)    Left Ventricle: cm, normal (men 4.2-5.9; women 3.8-5.2)  LV Wall Thickness (posterior wall):cm, normal (men 0.6-1.0 cm; women 0.6-0.9 cm)    LVAD  Estimated Ejection Fraction: > 55% normal  Regional Wall Abnormalities: no RWMA            Right Heart  Right Ventricle: normal  Right Ventricle Function: normal  Right Atria:  normal    Intra Atrial Septum  PFO: no shunt by color flow doppler          Right Ventricle  Size: normal, Free Wall Thickness: normal    Aortic Valve:  Stenosis: none.  Morphology: trileaflet    Regurgitation: no aortic valve regurgitation      Mitral Valve:   Morphology:normal  Jet Description: mild and centrally-directed    Tricuspid Valve:  Morphology: normal  Regurgitation: none    Pulmonic Valve:  Morphology:normal  Regurgitation(color flow): none    Great Vessels  Ascending Aorta Atherosclerosis: 1=nl-min dz

## 2024-05-09 NOTE — H&P
[facility] - [facility] ICU  Pulmonary Critical Care Note    Patient Name: Tanvi Miranda  MRN: [id number]  Admission Date: 5/5/2024  Hospital Length of Stay: 4 days  Code Status: Full Code  Attending Provider: Nehemias Talamantes MD  Primary Care Provider: Rupinder, Primary Doctor     Subjective:     HPI:   Ms. Miranda is a 68 y.o. female with PMH  HLD, HTN, and family history of early CAD who presented to [facility] ER on 5/5/24 complaining of left sided chest pain associated with fatigue and CEBALLOS for several weeks. She recently had a PET with Dr. Doe, had unremarkable ED workup and underwent LHC on 5/6/24 which showed multivessel CAD. On 5/9/24 patient underwent CABG x2 LIMA to LAD and saphenous graft to RCA Streaker with ligation of KISHORE which she tolerated well. EF estimated to be 55%.    Upon arrival to ICU, patient is NSR HR 72, RR 20, sats 100%, /67 on art line correlating to cuff, on Epi 0.04 mcg/kg/min, insulin gtt, and IVF. Mediastinal and L pleural CT to suction, scant bloody drainage in L and 20 cc bloody drainage in mediastinal. CXR shows grossly normal lungs with appropriate CVC placement in SVC and chest tubes and ETT appropriately placed.     Pump time: 44 min  Cross clamp time: 36 min  EBL: 800  mL  Cell saver: 2277 mL  Autologous blood: 387 mL  Blood products: none    Hospital Course/Significant events:  5/9/24: CABG x2 and ligation of KISHORE    24 Hour Interval History:  pending    History reviewed. No pertinent past medical history.    Past Surgical History:   Procedure Laterality Date    LEFT HEART CATHETERIZATION Left 5/6/2024    Procedure: Left heart cath;  Surgeon: Celso Watts Jr., MD;  Location: [facility] CATH LAB;  Service: Cardiology;  Laterality: Left;       Social History     Socioeconomic History    Marital status:    Tobacco Use    Smoking status: Never    Smokeless tobacco: Never     Social Determinants of Health     Financial Resource Strain: Patient Declined (5/6/2024)     Overall Financial Resource Strain (CARDIA)     Difficulty of Paying Living Expenses: Patient declined   Food Insecurity: No Food Insecurity (5/7/2024)    Hunger Vital Sign     Worried About Running Out of Food in the Last Year: Never true     Ran Out of Food in the Last Year: Never true   Transportation Needs: No Transportation Needs (5/7/2024)    TRANSPORTATION NEEDS     Transportation : No   Stress: Patient Declined (5/6/2024)    [employer] [place] [employer] - Occupational Stress Questionnaire     Feeling of Stress : Patient declined   Housing Stability: Low Risk  (5/7/2024)    Housing Stability Vital Sign     Unable to Pay for Housing in the Last Year: No     Homeless in the Last Year: No           Current Outpatient Medications   Medication Instructions    clopidogreL (PLAVIX) 75 mg, Oral, Daily    lisinopriL 10 mg, Oral, Daily    rosuvastatin (CRESTOR) 40 mg, Oral, Daily       Current Inpatient Medications   aspirin  325 mg Oral Once    [START ON 5/10/2024] aspirin  81 mg Oral Daily    atorvastatin  80 mg Oral QHS    ceFAZolin (Ancef) IV (PEDS and ADULTS)  2 g Intravenous Q12H    [START ON 5/10/2024] docusate sodium  100 mg Oral BID    famotidine (PF)  20 mg Intravenous Daily    [START ON 5/10/2024] folic acid  1 mg Oral Daily    [START ON 5/10/2024] metoprolol tartrate  12.5 mg Oral BID    mupirocin   Nasal BID    [START ON 5/10/2024] sucralfate  1 g Oral QID (AC & HS)       Current Intravenous Infusions   dexmedeTOMIDine (Precedex) infusion (titrating)  0-1.4 mcg/kg/hr Intravenous Continuous 11.805 mL/hr at 05/09/24 1325 0.6 mcg/kg/hr at 05/09/24 1325    dextrose 5 % and 0.45 % NaCl   Intravenous Continuous        EPINEPHrine  0-2 mcg/kg/min (Dosing Weight) Intravenous Continuous        insulin regular 1 units/mL infusion orderable (CTS POST-OP)  0-52 Units/hr Intravenous Continuous        loperamide  2 mg Oral Continuous PRN             Review of Systems   Unable to perform ROS: Intubated           Objective:       Intake/Output Summary (Last 24 hours) at 5/9/2024 1509  Last data filed at 5/9/2024 1500  Gross per 24 hour   Intake 2200 ml   Output 475 ml   Net 1725 ml         Vital Signs (Most Recent):  Temp: 97.9 °F (36.6 °C) (05/09/24 0745)  Pulse: 68 (05/09/24 0848)  Resp: 18 (05/09/24 0745)  BP: (!) 144/67 (05/09/24 0848)  SpO2: 100 % (05/09/24 0848)  Body mass index is 30.72 kg/m².  Weight: 78.7 kg (173 lb 6.4 oz) Vital Signs (24h Range):  Temp:  [97.6 °F (36.4 °C)-98.4 °F (36.9 °C)] 97.9 °F (36.6 °C)  Pulse:  [61-78] 68  Resp:  [16-18] 18  SpO2:  [96 %-100 %] 100 %  BP: (121-144)/(67-83) 144/67     Physical Exam  Vitals reviewed.   Constitutional:       General: She is not in acute distress.     Appearance: She is normal weight. She is not ill-appearing, toxic-appearing or diaphoretic.   HENT:      Head: Normocephalic and atraumatic.      Right Ear: External ear normal.      Left Ear: External ear normal.      Nose: Nose normal.      Mouth/Throat:      Mouth: Mucous membranes are moist.   Eyes:      Pupils: Pupils are equal, round, and reactive to light.   Cardiovascular:      Rate and Rhythm: Normal rate and regular rhythm.      Pulses: Normal pulses.      Comments: Distant heart tones  Mediastinal CT and left pleural CT to suction with bloody small volume drainage noted  Pulmonary:      Effort: Pulmonary effort is normal. No respiratory distress.      Breath sounds: Normal breath sounds. No wheezing, rhonchi or rales.      Comments: On mechanical vent  Abdominal:      General: Abdomen is flat. There is no distension.      Palpations: Abdomen is soft.      Tenderness: There is no abdominal tenderness.      Comments: Hypoactive bowel sounds   Musculoskeletal:      Right lower leg: No edema.      Left lower leg: No edema.   Skin:     General: Skin is warm and dry.      Comments: Sternal wound vac dressing in place   Neurological:      Comments: Intubated and sedated  Limited neuro exam            Lines/Drains/Airways       Central Venous Catheter Line  Duration             Percutaneous Central Line - Double Lumen  05/09/24 0830 <1 day              Drain  Duration                  Chest Tube 05/09/24 1405 Mediastinal 28 Fr. <1 day         Chest Tube 05/09/24 Left 28 Fr. <1 day         Urethral Catheter 05/09/24 <1 day              Airway  Duration                  Airway - Non-Surgical 05/09/24 1126 <1 day              Arterial Line  Duration             Arterial Line 05/09/24 1134 Left Radial <1 day              Peripheral Intravenous Line  Duration                  Peripheral IV - Single Lumen 05/05/24 1201 20 G Anterior;Proximal;Right Forearm 4 days                    Significant Labs:    Lab Results   Component Value Date    WBC 5.91 05/09/2024    HGB 9.1 (L) 05/09/2024    HCT 26 (L) 05/09/2024    MCV 91.2 05/09/2024     (L) 05/09/2024           BMP  Lab Results   Component Value Date     05/09/2024    K 4.1 05/09/2024    CHLORIDE 117 (H) 05/09/2024    CO2 24 05/09/2024    BUN 11.9 05/09/2024    CREATININE 0.68 05/09/2024    CALCIUM 10.2 05/09/2024    AGAP 0.0 05/09/2024         ABG  Recent Labs   Lab 05/09/24  0952 05/09/24  1142 05/09/24  1411   PH 7.360   < > 7.378   PO2 121.0*   < > 314*   PCO2 48.0*   < > 38.3   HCO3 27.1*   < > 22.5*   POCBASEDEF 1.00  --   --     < > = values in this interval not displayed.       Mechanical Ventilation Support:         Significant Imaging:  I have reviewed the pertinent imaging within the past 24 hours.        Assessment/Plan:     Assessment  CAD s/p CABG x2 LIMA to LAD and saphenous graft to RCA Streaker with ligation of KISHORE on 5/9/24  HTN  HLD      Plan  Admit to ICU  Continue post-CABG protocol  Wean MV per protocol, extubate when appropriate  CV surgery following for management of post-op care and chest tubes  Resume home PO meds when appropriate    DVT Prophylaxis: SCDs  GI Prophylaxis: Pepcid     45 minutes of critical care was time  spent personally by me on the following activities: development of treatment plan with patient or surrogate and bedside caregivers, discussions with consultants, evaluation of patient's response to treatment, examination of patient, ordering and performing treatments and interventions, ordering and review of laboratory studies, ordering and review of radiographic studies, pulse oximetry, re-evaluation of patient's condition.  This critical care time did not overlap with that of any other provider or involve time for any procedures.     Roque Taylor MD  [employer] IM PGY III  Pulmonary Critical Care Medicine  [facility] - [facility] ICU  DOS: 05/09/2024

## 2024-05-09 NOTE — TRANSFER OF CARE
"Anesthesia Transfer of Care Note    Patient: Tanvi Miranda    Procedure(s) Performed: Procedure(s) (LRB):  CORONARY ARTERY BYPASS GRAFT (CABG) (N/A)  SURGICAL PROCUREMENT, VEIN, ENDOSCOPIC (N/A)  Left atrial appendage closure device (N/A)    Patient location: ICU    Anesthesia Type: general    Transport from OR: Transported from OR intubated on 100% O2  with adequate ventilation controlled by transport ventilator. Continuous SpO2 monitoring in transport. Continuous ECG monitoring in transport. Continuos invasive BP monitoring in transport. Upon arrival to PACU/ICU, patient attached to ventilator and auscultated to confirm bilateral breath sounds and adequate TV    Post pain: adequate analgesia    Post assessment: no apparent anesthetic complications    Post vital signs: stable    Level of consciousness: sedated    Nausea/Vomiting: no nausea/vomiting    Complications: none    Transfer of care protocol was followed      Last vitals: Visit Vitals  BP (!) 144/67 (BP Location: Left arm, Patient Position: Lying)   Pulse 68   Temp 36.6 °C (97.9 °F) (Oral)   Resp 18   Ht 5' 3" (1.6 m)   Wt 78.7 kg (173 lb 6.4 oz)   LMP  (LMP Unknown)   SpO2 100%   Breastfeeding No   BMI 30.72 kg/m²     "

## 2024-05-09 NOTE — ANESTHESIA PROCEDURE NOTES
Arterial    Diagnosis: CABG    Patient location during procedure: pre-op    Staffing  Authorizing Provider: Marcio Flowers MD  Performing Provider: Harriet Estevez CRNA    Staffing  Performed by: Harriet Estevez CRNA  Authorized by: Marcio Flowers MD    Anesthesiologist was present at the time of the procedure.    Preanesthetic Checklist  Completed: patient identified, IV checked, site marked, risks and benefits discussed, surgical consent, monitors and equipment checked, pre-op evaluation, timeout performed and anesthesia consent givenArterial  Skin Prep: chlorhexidine gluconate  Orientation: left  Location: radial    Catheter Size: 20 G  Catheter placement by Ultrasound guidance. Heme positive aspiration all ports.   Vessel Caliber: medium, patent  Needle advanced into vessel with real time Ultrasound guidance.Insertion Attempts: 1  Assessment  Dressing: secured with tape and tegaderm

## 2024-05-09 NOTE — ANESTHESIA PROCEDURE NOTES
Intubation    Date/Time: 5/9/2024 11:26 AM    Performed by: Harriet Estevez CRNA  Authorized by: Marcio Flowers MD    Intubation:     Induction:  Intravenous    Intubated:  Postinduction    Mask Ventilation:  Easy mask    Attempts:  1    Attempted By:  Student    Method of Intubation:  Direct    Blade:  Guillory 2    Laryngeal View Grade: Grade IIA - cords partially seen      Difficult Airway Encountered?: No      Complications:  None    Airway Device:  Oral endotracheal tube    Airway Device Size:  8.0    Style/Cuff Inflation:  Cuffed    Inflation Amount (mL):  6    Tube secured:  22    Secured at:  The lips    Placement Verified By:  Capnometry    Complicating Factors:  None    Findings Post-Intubation:  BS equal bilateral and atraumatic/condition of teeth unchanged

## 2024-05-09 NOTE — PROGRESS NOTES
"[facility] - 6th Floor Medical Telemetry    Cardiology  Progress Note    Patient Name: Tanvi Miranda  MRN: [id number]  Admission Date: 5/5/2024  Hospital Length of Stay: 4 days  Code Status: Full Code   Attending Physician: Quentin Peterson MD   Primary Care Physician: Rupinder, Primary Doctor  Expected Discharge Date: 5/9/2024  Principal Problem:<principal problem not specified>    Subjective:     Brief HPI/Hospital Course: 68-year-old female that is known to Dr. Doe with a PMHx of HLD, HTN, GERD and family Hx of early CAD. She presented to [facility] ED on 5.5.24 with complaints of left sided chest pain. Patient recently had a VAN/PET and ECHO completed by Dr. Doe and was planned to follow up 5.9.24 for results. She reports associated SOB and fatugue with exertion. She also report radiating left arm pain. Her last [facility] was back in 2018. ED Course: HR 66, /84, RR 20, Temp 98.9F SpO2 100% on RA. Lab work showed: WBC 3.98, K 4.1, BUN/Cr 10.3/0.78, Ca 10.1, AST/ALT 28/23, BNP 12.7, Trop: <0.010 X2, EKG shows no acute ST changes. [facility] will admit for chest pain.      5.7.24:  NAD. VSS. No complaints of CP/SOB/Palps. "I feel okay"  5.8.24: NAD. VSS. No complaints of CP/SOB/Palps. "I feel okay" CV surgery planning CABG for tomorrow. Resting comfortably in bed.  5.9.24: NAD. Intubated/Sedated. VSS. On Epi and Precedex     PMH: HLD, HTN, GERD and family Hx of early CAD  PSH: Colonoscopy, tonsillectomy, Hysterectomy   Family History: Mother: CAD s/p PCI, Alzheimers, PAD, Brother & Sister: CAD  Social History: Denies Tobacco, illicit drug, or ETOH use.      Previous Cardiac Diagnostics:   [facility] (5.6.24):  There is significant coronary artery disease. Mid Left Anterior Descending has a 60-70% stenosis. Instantaneous wave-free ratio (iFR) was performed on the lesion, and found to be non-hemodynamically significant at 0.96. 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 Ostial Left Circumflex has a 95% " stenosis. The distal vessel is supplied via left-to-right collaterals from the LAD (septal perforating branches). Prox Right Coronary Artery has a calcified 90% stenosis. The vessel is moderately tortuous. The distal vessel is supplied via left-to-right collaterals from the LAD (septal perforating branches). LVEDP is normal at 13 mmHg.    [facility] PET (5.2.24):  The left ventricular cavity is noted to be normal on the stress studies. The stress left ventricular ejection fraction was calculated to be 63% and left ventricular global function is normal. The rest left ventricular cavity is noted to be normal. The rest left ventricular ejection fraction was calculated to be 50% and rest left ventricular global function is normal.   When compared to the resting ejection fraction (50%), the stress ejection fraction (63%) has increased.   There was a rise in myocardial blood flow between rest and stress.  Global myocardial blood flow reserve was 2.88.  Myocardial blood flow reserve is reduced in the inferior territory which is suggestive of flow limiting stenosis in this territory.  *FINAL READ NOT DONE*     ECHO (5.2.24):  The study quality is average.   The left ventricle is normal in size. Global left ventricular systolic function is normal. The left ventricular ejection fraction is 60%.   Normal appearing valvular structures and function are within study limits.   The pulmonary artery systolic pressure is 10 mmHg. The pulmonary artery appears to be normal.     [facility] ( 8.3.18):  LAD: arrises right from the aorta there is no LM. Significant calcification present in LAD. There is a 40-50% stenosis in the midpoint of the LAD. D2 is moderate in size with 20-30% stenosis   RCA: large prominent vessel. It is dominant. 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, Shows a long tubular  50% stenosis  proximally with heavy calcification,  No obstructive disease was found   PLAN: Medical management     Review of Systems   Unable to perform ROS: Intubated     Objective:     Vital Signs (Most Recent):  Temp: 97.9 °F (36.6 °C) (05/09/24 0745)  Pulse: 68 (05/09/24 0848)  Resp: 18 (05/09/24 0745)  BP: (!) 144/67 (05/09/24 0848)  SpO2: 100 % (05/09/24 0848) Vital Signs (24h Range):  Temp:  [97.6 °F (36.4 °C)-98.4 °F (36.9 °C)] 97.9 °F (36.6 °C)  Pulse:  [61-78] 68  Resp:  [16-18] 18  SpO2:  [96 %-100 %] 100 %  BP: (121-144)/(67-83) 144/67   Weight: 78.7 kg (173 lb 6.4 oz)  Body mass index is 30.72 kg/m².  SpO2: 100 %       Intake/Output Summary (Last 24 hours) at 5/9/2024 1423  Last data filed at 5/9/2024 1255  Gross per 24 hour   Intake 580 ml   Output 125 ml   Net 455 ml     Lines/Drains/Airways       Central Venous Catheter Line  Duration             Percutaneous Central Line - Double Lumen  05/09/24 0830 <1 day              Drain  Duration                  Chest Tube 05/09/24 1405 Mediastinal 28 Fr. <1 day         Chest Tube 05/09/24 Left 28 Fr. <1 day         Urethral Catheter 05/09/24 <1 day              Airway  Duration                  Airway - Non-Surgical 05/09/24 1126 <1 day              Arterial Line  Duration             Arterial Line 05/09/24 1134 Left Radial <1 day              Peripheral Intravenous Line  Duration                  Peripheral IV - Single Lumen 05/05/24 1201 20 G Anterior;Proximal;Right Forearm 4 days                  Significant Labs:   Recent Results (from the past 72 hour(s))   Magnesium    Collection Time: 05/07/24  5:45 AM   Result Value Ref Range    Magnesium Level 2.10 1.60 - 2.60 mg/dL   Comprehensive Metabolic Panel    Collection Time: 05/07/24  5:45 AM   Result Value Ref Range    Sodium 140 136 - 145 mmol/L    Potassium 3.9 3.5 - 5.1 mmol/L    Chloride 109 (H) 98 - 107 mmol/L    CO2 26 23 - 31 mmol/L    Glucose 99 82 - 115 mg/dL    Blood Urea Nitrogen 11.8 9.8 - 20.1 mg/dL     Creatinine 0.78 0.55 - 1.02 mg/dL    Calcium 10.0 8.4 - 10.2 mg/dL    Protein Total 6.1 5.8 - 7.6 gm/dL    Albumin 3.7 3.4 - 4.8 g/dL    Globulin 2.4 2.4 - 3.5 gm/dL    Albumin/Globulin Ratio 1.5 1.1 - 2.0 ratio    Bilirubin Total 0.9 <=1.5 mg/dL    ALP 82 40 - 150 unit/L    ALT 17 0 - 55 unit/L    AST 21 5 - 34 unit/L    eGFR >60 mls/min/1.73/m2   CBC with Differential    Collection Time: 05/07/24  5:45 AM   Result Value Ref Range    WBC 4.54 4.50 - 11.50 x10(3)/mcL    RBC 4.58 4.20 - 5.40 x10(6)/mcL    Hgb 13.7 12.0 - 16.0 g/dL    Hct 42.0 37.0 - 47.0 %    MCV 91.7 80.0 - 94.0 fL    MCH 29.9 27.0 - 31.0 pg    MCHC 32.6 (L) 33.0 - 36.0 g/dL    RDW 13.1 11.5 - 17.0 %    Platelet 161 130 - 400 x10(3)/mcL    MPV 10.1 7.4 - 10.4 fL    Neut % 50.9 %    Lymph % 35.0 %    Mono % 9.7 %    Eos % 3.1 %    Basophil % 1.1 %    Lymph # 1.59 0.6 - 4.6 x10(3)/mcL    Neut # 2.31 2.1 - 9.2 x10(3)/mcL    Mono # 0.44 0.1 - 1.3 x10(3)/mcL    Eos # 0.14 0 - 0.9 x10(3)/mcL    Baso # 0.05 <=0.2 x10(3)/mcL    IG# 0.01 0 - 0.04 x10(3)/mcL    IG% 0.2 %    NRBC% 0.0 %   CV Ultrasound Bilateral Doppler Carotid    Collection Time: 05/07/24 12:02 PM   Result Value Ref Range    Right CCA prox sys 80 cm/s    Right CCA prox shaver 9 cm/s    Right CCA dist sys 71 cm/s    Right CCA dist shaver 15 cm/s    Right ICA prox sys 58 cm/s    Right ICA prox shaver 14 cm/s    Right ICA mid sys 49 cm/s    Right ICA mid shaver 13 cm/s    Right ECA sys 86 cm/s    Right ECA shaver 11 cm/s    Right ICA/CCA ratio 0.82     Right Highest ICA 58.00     Right Highest EDV 14.00     Right Highest CCA 80     Left CCA prox sys 85 cm/s    Left CCA prox shaver 20 cm/s    Left CCA dist sys 71 cm/s    Left CCA dist shaver 18 cm/s    Left ICA prox sys 72 cm/s    Left ICA prox shaver 18 cm/s    Left ICA mid sys 70 cm/s    Left ICA mid shaver 18 cm/s    Left ICA dist sys 82 cm/s    Left ICA dist shaver 25 cm/s    Left ECA sys 56 cm/s    Left ECA shaver 7 cm/s    Left vertebral sys 44 cm/s    Left  vertebral shaver 0 cm/s    Left ICA/CCA ratio 1.15     Left Highest ICA 82.00     LT Highest EDV 25.00     Left Highest CCA 85     Right vertebral sys 40.20 cm/s    Right vertebral shaver 9.94 cm/s   Basic Metabolic Panel    Collection Time: 05/08/24  4:21 AM   Result Value Ref Range    Sodium 139 136 - 145 mmol/L    Potassium 4.4 3.5 - 5.1 mmol/L    Chloride 110 (H) 98 - 107 mmol/L    CO2 24 23 - 31 mmol/L    Glucose 102 82 - 115 mg/dL    Blood Urea Nitrogen 11.3 9.8 - 20.1 mg/dL    Creatinine 0.82 0.55 - 1.02 mg/dL    BUN/Creatinine Ratio 14     Calcium 10.2 8.4 - 10.2 mg/dL    Anion Gap 5.0 mEq/L    eGFR >60 mL/min/1.73/m2   Magnesium    Collection Time: 05/08/24  4:21 AM   Result Value Ref Range    Magnesium Level 2.10 1.60 - 2.60 mg/dL   CBC with Differential    Collection Time: 05/08/24  4:21 AM   Result Value Ref Range    WBC 4.43 (L) 4.50 - 11.50 x10(3)/mcL    RBC 4.77 4.20 - 5.40 x10(6)/mcL    Hgb 14.0 12.0 - 16.0 g/dL    Hct 42.4 37.0 - 47.0 %    MCV 88.9 80.0 - 94.0 fL    MCH 29.4 27.0 - 31.0 pg    MCHC 33.0 33.0 - 36.0 g/dL    RDW 12.9 11.5 - 17.0 %    Platelet 176 130 - 400 x10(3)/mcL    MPV 10.0 7.4 - 10.4 fL    Neut % 40.4 %    Lymph % 44.2 %    Mono % 10.2 %    Eos % 4.1 %    Basophil % 0.9 %    Lymph # 1.96 0.6 - 4.6 x10(3)/mcL    Neut # 1.79 (L) 2.1 - 9.2 x10(3)/mcL    Mono # 0.45 0.1 - 1.3 x10(3)/mcL    Eos # 0.18 0 - 0.9 x10(3)/mcL    Baso # 0.04 <=0.2 x10(3)/mcL    IG# 0.01 0 - 0.04 x10(3)/mcL    IG% 0.2 %    NRBC% 0.0 %   Protime-INR    Collection Time: 05/08/24  4:33 AM   Result Value Ref Range    PT 13.1 12.5 - 14.5 seconds    INR 1.0 <=1.3   APTT    Collection Time: 05/08/24  4:33 AM   Result Value Ref Range    PTT 67.6 (H) 23.2 - 33.7 seconds   Type & Screen    Collection Time: 05/08/24  4:33 AM   Result Value Ref Range    Group & Rh O NEG     Indirect Brenna GEL NEG     Specimen Outdate 05/11/2024 23:59    Prepare RBC 4 Units; ON CALL TO OR    Collection Time: 05/08/24  4:33 AM   Result Value  Ref Range    UNIT NUMBER [id number]     UNIT ABO/RH O NEG     DISPENSE STATUS Issued     Unit Expiration [id number]     Product Code S2639F89     Unit Blood Type Code 9500     CROSSMATCH INTERPRETATION Compatible     UNIT NUMBER [id number]     UNIT ABO/RH O NEG     DISPENSE STATUS Issued     Unit Expiration [id number]     Product Code A2172B77     Unit Blood Type Code 9500     CROSSMATCH INTERPRETATION Compatible     UNIT NUMBER [id number]     UNIT ABO/RH O NEG     DISPENSE STATUS Issued     Unit Expiration [id number]     Product Code V8369W14     Unit Blood Type Code 9500     CROSSMATCH INTERPRETATION Compatible     UNIT NUMBER [id number]     UNIT ABO/RH O NEG     DISPENSE STATUS Issued     Unit Expiration [id number]     Product Code R9087K91     Unit Blood Type Code 9500     CROSSMATCH INTERPRETATION Compatible    Urinalysis    Collection Time: 05/08/24  4:39 AM   Result Value Ref Range    Color, UA Light-Yellow Yellow, Light-Yellow, Colorless, Straw, Dark-Yellow    Appearance, UA Clear Clear    Specific Gravity, UA 1.020 1.005 - 1.030    pH, UA 5.5 5.0 - 8.5    Protein, UA Negative Negative    Glucose, UA Normal Negative, Normal    Ketones, UA Negative Negative    Blood, UA Negative Negative    Bilirubin, UA Negative Negative    Urobilinogen, UA Normal 0.2, 1.0, Normal    Nitrites, UA Negative Negative    Leukocyte Esterase, UA Negative Negative    WBC, UA 0-5 None Seen, 0-2, 3-5, 0-5 /HPF    Bacteria, UA None Seen None Seen, Trace /HPF    Squamous Epithelial Cells, UA Trace None Seen /HPF    Mucous, UA Trace (A) None Seen /LPF    RBC, UA 0-5 None Seen, 0-2, 3-5, 0-5 /HPF   MRSA PCR    Collection Time: 05/08/24  4:39 AM   Result Value Ref Range    MRSA PCR SCRN ([facility]) Not Detected Not Detected   ABORH RETYPE    Collection Time: 05/08/24  5:40 AM   Result Value Ref Range    ABORH Retype O NEG    Magnesium    Collection Time: 05/09/24  4:03 AM   Result Value Ref Range    Magnesium Level 2.10 1.60  - 2.60 mg/dL   Comprehensive Metabolic Panel    Collection Time: 05/09/24  4:03 AM   Result Value Ref Range    Sodium 141 136 - 145 mmol/L    Potassium 4.0 3.5 - 5.1 mmol/L    Chloride 111 (H) 98 - 107 mmol/L    CO2 23 23 - 31 mmol/L    Glucose 106 82 - 115 mg/dL    Blood Urea Nitrogen 13.7 9.8 - 20.1 mg/dL    Creatinine 0.77 0.55 - 1.02 mg/dL    Calcium 9.8 8.4 - 10.2 mg/dL    Protein Total 6.3 5.8 - 7.6 gm/dL    Albumin 3.6 3.4 - 4.8 g/dL    Globulin 2.7 2.4 - 3.5 gm/dL    Albumin/Globulin Ratio 1.3 1.1 - 2.0 ratio    Bilirubin Total 0.7 <=1.5 mg/dL    ALP 81 40 - 150 unit/L    ALT 13 0 - 55 unit/L    AST 18 5 - 34 unit/L    eGFR >60 mL/min/1.73/m2   CBC with Differential    Collection Time: 05/09/24  4:03 AM   Result Value Ref Range    WBC 5.57 4.50 - 11.50 x10(3)/mcL    RBC 4.52 4.20 - 5.40 x10(6)/mcL    Hgb 13.5 12.0 - 16.0 g/dL    Hct 40.5 37.0 - 47.0 %    MCV 89.6 80.0 - 94.0 fL    MCH 29.9 27.0 - 31.0 pg    MCHC 33.3 33.0 - 36.0 g/dL    RDW 12.8 11.5 - 17.0 %    Platelet 166 130 - 400 x10(3)/mcL    MPV 9.9 7.4 - 10.4 fL    Neut % 58.3 %    Lymph % 27.8 %    Mono % 9.9 %    Eos % 3.1 %    Basophil % 0.7 %    Lymph # 1.55 0.6 - 4.6 x10(3)/mcL    Neut # 3.25 2.1 - 9.2 x10(3)/mcL    Mono # 0.55 0.1 - 1.3 x10(3)/mcL    Eos # 0.17 0 - 0.9 x10(3)/mcL    Baso # 0.04 <=0.2 x10(3)/mcL    IG# 0.01 0 - 0.04 x10(3)/mcL    IG% 0.2 %    NRBC% 0.0 %   POCT glucose    Collection Time: 05/09/24  6:38 AM   Result Value Ref Range    POCT Glucose 94 70 - 110 mg/dL   RT Blood Gas    Collection Time: 05/09/24  9:52 AM   Result Value Ref Range    Sample Type Arterial Blood     Sample site Arterial Line     Drawn by SAMINA /raúl     pH, Blood gas 7.360 7.350 - 7.450    pCO2, Blood gas 48.0 (H) 35.0 - 45.0 mmHg    pO2, Blood gas 121.0 (H) 80.0 - 100.0 mmHg    Sodium, Blood Gas 137 137 - 145 mmol/L    Potassium, Blood Gas 3.8 3.5 - 5.0 mmol/L    Calcium Level Ionized 1.30 (H) 1.12 - 1.23 mmol/L    TOC2, Blood gas 28.6 mmol/L    Base  Excess, Blood gas 1.00 -2.00 - 2.00 mmol/L    sO2, Blood gas 98.6 %    HCO3, Blood gas 27.1 (H) 22.0 - 26.0 mmol/L    THb, Blood gas 13.7 12 - 16 g/dL    O2 Hb, Blood Gas 96.6 94.0 - 97.0 %    CO Hgb 1.4 0.5 - 1.5 %    Met Hgb 1.0 0.4 - 1.5 %    Allens Test N/A     Oxygen Device, Blood gas Cannula     LPM 2    ISTAT PROCEDURE    Collection Time: 05/09/24 11:41 AM   Result Value Ref Range    POC PH 7.332 (L) 7.35 - 7.45    POC PCO2 40.6 35 - 45 mmHg    POC PO2 52 (LL) 80 - 100 mmHg    POC HCO3 21.5 (L) 24 - 28 mmol/L    POC BE -4 (L) -2 to 2 mmol/L    POC SATURATED O2 84 95 - 100 %    POC Glucose 90 70 - 110 mg/dL    POC Sodium 146 (H) 136 - 145 mmol/L    POC Potassium 4.0 3.5 - 5.1 mmol/L    POC TCO2 23 23 - 27 mmol/L    POC Ionized Calcium 1.38 1.06 - 1.42 mmol/L    POC Hematocrit 39 36 - 54 %PCV    POC HEMOGLOBIN 13 g/dL    Sample ARTERIAL    ISTAT PROCEDURE    Collection Time: 05/09/24 12:43 PM   Result Value Ref Range    POC PH 7.281 (LL) 7.35 - 7.45    POC PCO2 49.3 (H) 35 - 45 mmHg    POC PO2 52 (LL) 80 - 100 mmHg    POC HCO3 23.2 (L) 24 - 28 mmol/L    POC BE -4 (L) -2 to 2 mmol/L    POC SATURATED O2 82 95 - 100 %    POC Glucose 102 70 - 110 mg/dL    POC Sodium 143 136 - 145 mmol/L    POC Potassium 3.7 3.5 - 5.1 mmol/L    POC TCO2 25 23 - 27 mmol/L    POC Ionized Calcium 1.33 1.06 - 1.42 mmol/L    POC Hematocrit 32 (L) 36 - 54 %PCV    POC HEMOGLOBIN 11 g/dL    Sample ARTERIAL    ISTAT PROCEDURE    Collection Time: 05/09/24  1:04 PM   Result Value Ref Range    POC PH 7.398 7.35 - 7.45    POC PCO2 36.0 35 - 45 mmHg    POC PO2 320 (H) 80 - 100 mmHg    POC HCO3 22.1 (L) 24 - 28 mmol/L    POC BE -3 (L) -2 to 2 mmol/L    POC SATURATED O2 100 95 - 100 %    POC Glucose 97 70 - 110 mg/dL    POC Sodium 144 136 - 145 mmol/L    POC Potassium 6.0 (H) 3.5 - 5.1 mmol/L    POC TCO2 23 23 - 27 mmol/L    POC Ionized Calcium 0.94 (L) 1.06 - 1.42 mmol/L    POC Hematocrit 21 (L) 36 - 54 %PCV    POC HEMOGLOBIN 7 g/dL    Sample  ARTERIAL     FiO2 60    ISTAT PROCEDURE    Collection Time: 05/09/24  1:11 PM   Result Value Ref Range    POC PH 7.388 7.35 - 7.45    POC PCO2 39.1 35 - 45 mmHg    POC PO2 41 40 - 60 mmHg    POC HCO3 23.6 (L) 24 - 28 mmol/L    POC BE -1 -2 to 2 mmol/L    POC SATURATED O2 75 95 - 100 %    POC Glucose 106 70 - 110 mg/dL    POC Sodium 145 136 - 145 mmol/L    POC Potassium 4.7 3.5 - 5.1 mmol/L    POC TCO2 25 24 - 29 mmol/L    POC Ionized Calcium 1.09 1.06 - 1.42 mmol/L    POC Hematocrit 22 (L) 36 - 54 %PCV    POC HEMOGLOBIN 8 g/dL    Sample VENOUS     FiO2 60    ISTAT PROCEDURE    Collection Time: 05/09/24  1:29 PM   Result Value Ref Range    POC PH 7.333 (L) 7.35 - 7.45    POC PCO2 52.2 (H) 35 - 45 mmHg    POC PO2 381 (H) 80 - 100 mmHg    POC HCO3 27.7 24 - 28 mmol/L    POC BE 2 -2 to 2 mmol/L    POC SATURATED O2 100 95 - 100 %    POC Glucose 115 (H) 70 - 110 mg/dL    POC Sodium 145 136 - 145 mmol/L    POC Potassium 4.9 3.5 - 5.1 mmol/L    POC TCO2 29 (H) 23 - 27 mmol/L    POC Ionized Calcium 1.20 1.06 - 1.42 mmol/L    POC Hematocrit 22 (L) 36 - 54 %PCV    POC HEMOGLOBIN 8 g/dL    Sample ARTERIAL     FiO2 80    ISTAT PROCEDURE    Collection Time: 05/09/24  2:11 PM   Result Value Ref Range    POC PH 7.378 7.35 - 7.45    POC PCO2 38.3 35 - 45 mmHg    POC PO2 314 (H) 80 - 100 mmHg    POC HCO3 22.5 (L) 24 - 28 mmol/L    POC BE -3 (L) -2 to 2 mmol/L    POC SATURATED O2 100 95 - 100 %    POC Glucose 97 70 - 110 mg/dL    POC Sodium 143 136 - 145 mmol/L    POC Potassium 4.2 3.5 - 5.1 mmol/L    POC TCO2 24 23 - 27 mmol/L    POC Ionized Calcium 1.60 (H) 1.06 - 1.42 mmol/L    POC Hematocrit 26 (L) 36 - 54 %PCV    POC HEMOGLOBIN 9 g/dL    Sample ARTERIAL     FiO2 100      Telemetry:  SR    Physical Exam  Vitals and nursing note reviewed.   HENT:      Head: Normocephalic.      Nose: Nose normal.      Mouth/Throat:      Mouth: Mucous membranes are moist.   Eyes:      Pupils: Pupils are equal, round, and reactive to light.    Cardiovascular:      Rate and Rhythm: Normal rate and regular rhythm.      Pulses: Normal pulses.      Heart sounds: Normal heart sounds.   Pulmonary:      Effort: Pulmonary effort is normal.      Comments: Ventilator Associated Breath Sounds        Abdominal:      General: Bowel sounds are normal.      Palpations: Abdomen is soft.   Genitourinary:     Comments: Serrano   Musculoskeletal:      Comments: CT x2   Skin:     General: Skin is warm.      Comments: R Wrist Soft/Flat, Non-Tender, No Sign of Bleed/Infection. +2 BLE Palpable Radial Pulses     Neurological:      Comments: Intubated/Sedated    Psychiatric:         Mood and Affect: Mood normal.         Behavior: Behavior normal.       Current Inpatient Medications:  Current Facility-Administered Medications:     acetaminophen tablet 650 mg, 650 mg, Oral, Q4H PRN, Celso Watts Jr., MD, 650 mg at 05/07/24 1230    aspirin EC tablet 325 mg, 325 mg, Oral, Once, Aubrey Davis III, MD    aspirin tablet 325 mg, 325 mg, Oral, Daily, Aubrey Davis III, MD, 325 mg at 05/08/24 0841    atorvastatin tablet 80 mg, 80 mg, Oral, QHS, Danuta Cheek NP, 80 mg at 05/08/24 1940    calcium chloride 100 mg/mL (10 %) injection, , , PRN, Nehemias Talamantes MD    calcium gluconate 1 g in NS IVPB (premixed), 1 g, Intravenous, PRN, Anna Mitchell PA    calcium gluconate 1 g in NS IVPB (premixed), 2 g, Intravenous, PRN, Anna Mitchell PA    calcium gluconate 1 g in NS IVPB (premixed), 3 g, Intravenous, PRN, Anna Mitchell PA    cefUROXime sodium 1.5 g in dextrose 5 % in water (D5W) 100 mL IVPB (MB+), 1.5 g, Intravenous, On Call Procedure, Quentin Peterson MD, 1.5 g at 05/09/24 1142    dexmedetomidine (PRECEDEX) 400mcg/100mL 0.9% NaCL infusion, 0-1.4 mcg/kg/hr, Intravenous, Continuous, Anna Mitchell PA, Last Rate: 11.805 mL/hr at 05/09/24 1325, 0.6 mcg/kg/hr at 05/09/24 1325    dextrose 10% bolus 125 mL 125 mL, 12.5 g, Intravenous, PRN, Block, Anan GALLARDO PA     dextrose 10% bolus 250 mL 250 mL, 25 g, Intravenous, PRN, Anna Mitchell PA    heparin (porcine) injection, , , PRN, Nehemias Talamantes MD, 5,000 Units at 05/09/24 1020    heparin, porcine (PF) 100 unit/mL injection flush 500 Units, 5 mL, Intravenous, On Call Procedure, Nehemias Talamantes MD    hydrALAZINE injection 10 mg, 10 mg, Intravenous, Q4H PRN, Celso Watts Jr., MD    HYDROcodone-acetaminophen 5-325 mg per tablet 1 tablet, 1 tablet, Oral, Q4H PRN, Celso Watts Jr., MD, 1 tablet at 05/07/24 2001    magnesium sulfate 2g in water 50mL IVPB (premix), 2 g, Intravenous, PRN, Anna Mitchell PA    magnesium sulfate 2g in water 50mL IVPB (premix), 4 g, Intravenous, PRN, Anna Mitchell PA    morphine injection 2 mg, 2 mg, Intravenous, Q4H PRN, Celso Watts Jr., MD    mupirocin 2 % ointment, , Nasal, On Call Procedure, Nehemias Talamantes MD    nitroGLYCERIN SL tablet 0.4 mg, 0.4 mg, Sublingual, Q5 Min PRN, Aubrey Davis III, MD    ondansetron injection 4 mg, 4 mg, Intravenous, Q8H PRN, Celso Watts Jr., MD    papaverine injection, , , PRN, Nehemias Talamantes MD, 60 mg at 05/09/24 1020    potassium chloride 20 mEq in 100 mL IVPB (FOR CENTRAL LINE ADMINISTRATION ONLY), 20 mEq, Intravenous, PRN, Anna Mitchell PA    potassium chloride 20 mEq in 100 mL IVPB (FOR CENTRAL LINE ADMINISTRATION ONLY), 40 mEq, Intravenous, PRN, Anna Mitchell PA    potassium chloride 20 mEq in 100 mL IVPB (FOR CENTRAL LINE ADMINISTRATION ONLY), 60 mEq, Intravenous, PRN, Anna Mitchell PA    sodium chloride 0.9% flush 10 mL, 10 mL, Intravenous, PRN, Aubrey Davis III, MD    sodium chloride 0.9% flush 10 mL, 10 mL, Intravenous, PRN, Danuta Cheek NP    thrombin (bovine) solution, , , PRN, Nehemias Talamantes MD    Facility-Administered Medications Ordered in Other Encounters:     calcium chloride 100 mg/mL (10 %) injection, , Intravenous, PRN, Harriet Estevez CRNA, 250 mg at 05/09/24 1398    ePHEDrine  sulfate, , Intravenous, PRN, Daggett, Harriet R, CRNA, 5 mg at 05/09/24 1211    EPINEPHrine (ADRENALIN) 5 mg infusion, , Intravenous, Continuous PRN, Daggett, Harriet R, CRNA, Last Rate: 0 mL/hr at 05/09/24 1341, 0.02 mcg/kg/min at 05/09/24 1401    etomidate injection, , Intravenous, PRN, Daggett, Harriet R, CRNA, 16 mg at 05/09/24 1124    fentaNYL injection, , Intravenous, PRN, Herb, Harriet R, CRNA, 100 mcg at 05/09/24 1344    heparin (porcine) injection, , Intravenous, PRN, Daggett, Harriet R, CRNA, 25,000 Units at 05/09/24 1236    LIDOcaine (PF) 20 mg/mL (2%) injection, , Intravenous, PRN, Daggett, Harriet R, CRNA, 80 mg at 05/09/24 1124    metoprolol injection, , Intravenous, PRN, Daggett, Harriet R, CRNA, 1 mg at 05/09/24 1143    midazolam injection, , Intravenous, PRN, Herb, Harriet R, CRNA, 1.5 mg at 05/09/24 1339    PHENYLephrine injection, , Intravenous, PRN, Daggett, Harriet R, CRNA, 100 mcg at 05/09/24 1401    propofol (DIPRIVAN) 10 mg/mL infusion, , Intravenous, PRN, Daggett, Harriet R, CRNA, 30 mg at 05/09/24 1124    protamine injection, , Intravenous, PRN, Daggett, Harriet R, CRNA, 350 mg at 05/09/24 1341    rocuronium injection, , Intravenous, PRN, Herb, Harriet R, CRNA, 25 mg at 05/09/24 1339    sodium chloride 0.9% infusion, , Intravenous, Continuous PRN, Daggett, Harriet R, CRNA, New Bag at 05/09/24 1109    sodium chloride 0.9% infusion, , Intravenous, Continuous PRN, Herb, Harriet R, CRNA, New Bag at 05/09/24 1131    succinylcholine injection, , Intravenous, PRN, Herb, Harriet R, CRNA, 160 mg at 05/09/24 1125    tranexamic acid injection Soln, , Intravenous, PRN, Harriet Estevez, CRNA, 780 mg at 05/09/24 1341  VTE Risk Mitigation (From admission, onward)           Ordered     heparin (porcine) injection  As needed (PRN)         05/09/24 1019     heparin, porcine (PF) 100 unit/mL injection flush 500 Units  On Call Procedure         05/09/24 0832     IP VTE HIGH RISK PATIENT  Once         05/05/24 1244     Place sequential compression device   Until discontinued         05/05/24 1244                  Assessment:   MV CAD    - 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 to LAD, SV to RCA Streaker; LAAL    - LHC (5.6.24): Severe CAD; Mid LAD 60-70% Stenosis IFR 0.96, Ostial Left Circumflex 95%, Prox RCA 90%    - PET (5.2.24): concern for Anterior ischemia ~ Dr. Zapata reviewed   Acute Respiratory Failure Requiring oxygenation - on Ventilator   Hypotension requiring pressors  - on Epi     - History of HTN  Unstable Angina - Resolved   HLD  GERD  No Hx of GI bleed    Plan:   Continue ASA and Statin   No BB Secondary to baseline Bradycardia  CT Management per CV Surgery Team  Wean Epi as Tolerated   Ventilator Management per ICU Team   Labs in AM: CBC, CMP and Mag     TYLER Beatty  Cardiology  [facility] - 6th Floor Medical Telemetry  05/09/2024    I agree with the findings of the complexity of problems addressed and take responsibility for the plan's risks and complications. I approved the plan documented by Osiris Julian NP.  Routine post-op care

## 2024-05-09 NOTE — OP NOTE
Preop diagnosis:  Severe coronary artery disease      Procedure:  Coronary artery bypass grafting X 2 ,   Left internal mammary artery to the LAD   Saphenous venous graft to   RCA Streaker  Ligation of left atrial appendage  Endoscopic venous harvesting left greater saphenous vein          Postop diagnosis:  The same as preop    Date:  05/09/2024    Anesthesia:  General    Blood loss: Per perfusion     Surgeon: Nehemias Talamantes MD    Assistant:  DESMOND Bradford PA        Under informed consent the  patient was taken the OR, put in a supine position and  general anesthesia was induced and therefore maintained for remainder of the procedure.All the pressure points were padded equally. The skin over the chest abdomen and legs was prepped and draped in the usual sterile fashion. IV antibiotics were administered. Anesthesia has placed the appropriate lines.  Preop KASHIF was performed revealing good ventricular contractility no valvular disease and no left atrial appendage clots    Endoscopic venous harvesting was performed left lower extremity with good quality vein.  A Midline incision was made followed by taking down the subcutaneous tissue and fascia exposing the sternum that was penetrated in the midline. The mammary was harvested on a pedicle, the patient was heparinized. A midline retractor was placed, the pericardium was opened and pericardial stay sutures were placed. Ascending aortic cannulation was performed, the mammary was cut, the distal pedicle was ligated and clipped and proximal pedicle was controlled with a bulldog.The mammary had good flow in it.  Dual stage venous cannula was placed in the right atrium and when the ACT was therapeutic the patient went on full cardiopulmonary bypass with good emptying.  Cross-clamp was placed followed by antegrade dose of cardioplegia and 1 L of cold blood repeated throughout the case at 20-30 minutes intervals.  We had good either isoelectricity  throughout the whole case.  The patient's temperature was allowed to drift to 34° C.    Examination over the lateral aspect of the heart revealed the left atrial appendage.  This was ligated using a 40 mm atrial clip.  I spent significant time trying to locate the OM vessel I believe all the branches intra myocardial and there was nothing to bypass on the lateral wall.  Next the right coronary artery was examined this was opened at the level of  distal RCA.  This was an RCA [place] heading across the septum to the left ventricle. this was a  2     mm vessel.  This was anastomosed to reverse segment of saphenous vein with good flow down the graft, the vein was cut to the appropriate lengths.  A slit was made in the pericardium, the LAD was opened in the mid epicardium.  This was  3     millimiters in diameter.  This was anastomosed to the mammary in running Prolene fashion, the mammary pedicle was tacked to the epicardium.  When the mammary bulldog was released there was brisk blood flow down the LAD indicating a very patent anastomosis.  The patient was being rewarmed, the cross-clamp was removed and a  partial occlusion clamp was placed.  1 arteriotomy were performed and a 4 mm punch was used, the proximal anastomosis was made with running Prolene.  The partial occluding clamp was released.  The left chest was evacuated of blood.  The proximal and distal anastomosis were checked for hemostasis.  When the patient was warm, he came off pump with no problem.  Heparin was reversed with protamine.  The mediastinum and left chest were drained. The patient was decannulated.  The sternum was wired and the wounds were closed in layers absorbable sutures. The patient tolerated the procedure well.  Postop KASHIF revealed excellent contractility      he was transferred to the ICU in acceptable condition. 2

## 2024-05-09 NOTE — ANESTHESIA PREPROCEDURE EVALUATION
05/09/2024  Tanvi Miranda is a 68 y.o., female.    Patient is a 68 y.o. female with a PMH of HLD, HTN, and family history of early CAD.  She presented to [facility] ER on 5/5/24 complaining of left sided chest pain.  She also reports associated SOB and fatigue with activity that had been present for several weeks.  Patient recently had a PET with Dr. Doe with plans to follow up for results later this week.   Work up in the ED was essentially negative, but she was admitted under cardiology services.    5/6/24 LHC done revealing multivessel coronary artery disease.  CT surgery was consulted for CABG evaluation.    5/6/24 cardiac cath:    There is significant coronary artery disease.    Mid Left Anterior Descending has a 60-70% stenosis. Instantaneous wave-free ratio (iFR) was performed on the lesion, and found to be non-hemodynamically significant at 0.96.    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 Ostial Left Circumflex has a 95% stenosis. The distal vessel is supplied via left-to-right collaterals from the LAD (septal perforating branches).    Prox Right Coronary Artery has a calcified 90% stenosis. The vessel is moderately tortuous. The distal vessel is supplied via left-to-right collaterals from the LAD (septal perforating branches).    LVEDP is normal at 13 mmHg.    No current facility-administered medications on file prior to encounter.     Current Outpatient Medications on File Prior to Encounter   Medication Sig Dispense Refill    clopidogreL (PLAVIX) 75 mg tablet Take 75 mg by mouth once daily.      lisinopriL 10 MG tablet Take 10 mg by mouth once daily.      rosuvastatin (CRESTOR) 40 MG Tab Take 40 mg by mouth once daily.          Pre-op Assessment    I have reviewed the Patient Summary Reports.     I have reviewed the Nursing Notes. I have reviewed the NPO Status.   I have  reviewed the Medications.     Review of Systems  Anesthesia Hx:  No problems with previous Anesthesia                Social:  Non-Smoker       Cardiovascular:  Exercise tolerance: good   Hypertension   CAD           hyperlipidemia   ECG has been reviewed.                              Physical Exam  General: Cooperative, Alert and Oriented    Airway:  Mallampati: II   Mouth Opening: Normal  TM Distance: Normal  Tongue: Normal  Neck ROM: Extension Decreased    Dental:  Intact        Anesthesia Plan  Type of Anesthesia, risks & benefits discussed:    Anesthesia Type: Gen ETT  Intra-op Monitoring Plan: Standard ASA Monitors, Art Line, Central Line and KASHIF  Post Op Pain Control Plan: multimodal analgesia  Induction:  IV  Airway Plan: Direct, Post-Induction  Informed Consent: Informed consent signed with the Patient and all parties understand the risks and agree with anesthesia plan.  All questions answered. Patient consented to blood products? Yes  ASA Score: 3  Day of Surgery Review of History & Physical: H&P Update referred to the surgeon/provider.I have interviewed and examined the patient. I have reviewed the patient's H&P dated: There are no significant changes.     Ready For Surgery From Anesthesia Perspective.     .

## 2024-05-10 LAB
ALBUMIN SERPL-MCNC: 3.4 G/DL (ref 3.4–4.8)
ALBUMIN/GLOB SERPL: 2.6 RATIO (ref 1.1–2)
ALP SERPL-CCNC: 36 UNIT/L (ref 40–150)
ALT SERPL-CCNC: 11 UNIT/L (ref 0–55)
AST SERPL-CCNC: 34 UNIT/L (ref 5–34)
BILIRUB SERPL-MCNC: 1 MG/DL
BUN SERPL-MCNC: 7.5 MG/DL (ref 9.8–20.1)
CALCIUM SERPL-MCNC: 8 MG/DL (ref 8.4–10.2)
CHLORIDE SERPL-SCNC: 111 MMOL/L (ref 98–107)
CO2 SERPL-SCNC: 21 MMOL/L (ref 23–31)
CREAT SERPL-MCNC: 0.67 MG/DL (ref 0.55–1.02)
ERYTHROCYTE [DISTWIDTH] IN BLOOD BY AUTOMATED COUNT: 14.4 % (ref 11.5–17)
GFR SERPLBLD CREATININE-BSD FMLA CKD-EPI: >60 ML/MIN/1.73/M2
GLOBULIN SER-MCNC: 1.3 GM/DL (ref 2.4–3.5)
GLUCOSE SERPL-MCNC: 111 MG/DL (ref 82–115)
HCT VFR BLD AUTO: 31.6 % (ref 37–47)
HGB BLD-MCNC: 10.4 G/DL (ref 12–16)
MCH RBC QN AUTO: 29 PG (ref 27–31)
MCHC RBC AUTO-ENTMCNC: 32.9 G/DL (ref 33–36)
MCV RBC AUTO: 88 FL (ref 80–94)
NRBC BLD AUTO-RTO: 0 %
PLATELET # BLD AUTO: 98 X10(3)/MCL (ref 130–400)
PLATELETS.RETICULATED NFR BLD AUTO: 2.6 % (ref 0.9–11.2)
PMV BLD AUTO: 10.8 FL (ref 7.4–10.4)
POCT GLUCOSE: 106 MG/DL (ref 70–110)
POCT GLUCOSE: 106 MG/DL (ref 70–110)
POCT GLUCOSE: 107 MG/DL (ref 70–110)
POCT GLUCOSE: 108 MG/DL (ref 70–110)
POCT GLUCOSE: 111 MG/DL (ref 70–110)
POCT GLUCOSE: 113 MG/DL (ref 70–110)
POCT GLUCOSE: 115 MG/DL (ref 70–110)
POCT GLUCOSE: 116 MG/DL (ref 70–110)
POCT GLUCOSE: 138 MG/DL (ref 70–110)
POTASSIUM SERPL-SCNC: 4 MMOL/L (ref 3.5–5.1)
PROT SERPL-MCNC: 4.7 GM/DL (ref 5.8–7.6)
RBC # BLD AUTO: 3.59 X10(6)/MCL (ref 4.2–5.4)
SODIUM SERPL-SCNC: 136 MMOL/L (ref 136–145)
WBC # SPEC AUTO: 7.85 X10(3)/MCL (ref 4.5–11.5)

## 2024-05-10 PROCEDURE — 94760 N-INVAS EAR/PLS OXIMETRY 1: CPT

## 2024-05-10 PROCEDURE — P9045 ALBUMIN (HUMAN), 5%, 250 ML: HCPCS | Mod: JZ,JG | Performed by: PHYSICIAN ASSISTANT

## 2024-05-10 PROCEDURE — 25000003 PHARM REV CODE 250: Performed by: PHYSICIAN ASSISTANT

## 2024-05-10 PROCEDURE — 63600175 PHARM REV CODE 636 W HCPCS

## 2024-05-10 PROCEDURE — 63600175 PHARM REV CODE 636 W HCPCS: Mod: JZ,JG | Performed by: PHYSICIAN ASSISTANT

## 2024-05-10 PROCEDURE — 25000003 PHARM REV CODE 250: Performed by: THORACIC SURGERY (CARDIOTHORACIC VASCULAR SURGERY)

## 2024-05-10 PROCEDURE — 99024 POSTOP FOLLOW-UP VISIT: CPT | Mod: ,,,

## 2024-05-10 PROCEDURE — 25000003 PHARM REV CODE 250: Performed by: NURSE PRACTITIONER

## 2024-05-10 PROCEDURE — 80053 COMPREHEN METABOLIC PANEL: CPT | Performed by: PHYSICIAN ASSISTANT

## 2024-05-10 PROCEDURE — 85027 COMPLETE CBC AUTOMATED: CPT | Performed by: PHYSICIAN ASSISTANT

## 2024-05-10 PROCEDURE — 25000003 PHARM REV CODE 250: Performed by: STUDENT IN AN ORGANIZED HEALTH CARE EDUCATION/TRAINING PROGRAM

## 2024-05-10 PROCEDURE — 97110 THERAPEUTIC EXERCISES: CPT

## 2024-05-10 PROCEDURE — 21400001 HC TELEMETRY ROOM

## 2024-05-10 PROCEDURE — 63600175 PHARM REV CODE 636 W HCPCS: Performed by: THORACIC SURGERY (CARDIOTHORACIC VASCULAR SURGERY)

## 2024-05-10 RX ORDER — BISACODYL 10 MG/1
10 SUPPOSITORY RECTAL DAILY PRN
OUTPATIENT
Start: 2024-05-10

## 2024-05-10 RX ORDER — ENOXAPARIN SODIUM 100 MG/ML
40 INJECTION SUBCUTANEOUS EVERY 24 HOURS
Status: DISCONTINUED | OUTPATIENT
Start: 2024-05-10 | End: 2024-05-14 | Stop reason: HOSPADM

## 2024-05-10 RX ORDER — POLYETHYLENE GLYCOL 3350 17 G/17G
17 POWDER, FOR SOLUTION ORAL DAILY PRN
OUTPATIENT
Start: 2024-05-10

## 2024-05-10 RX ORDER — OXYCODONE HYDROCHLORIDE 5 MG/1
5 TABLET ORAL EVERY 4 HOURS PRN
Status: DISCONTINUED | OUTPATIENT
Start: 2024-05-10 | End: 2024-05-14 | Stop reason: HOSPADM

## 2024-05-10 RX ORDER — OXYCODONE HYDROCHLORIDE 5 MG/1
5 TABLET ORAL EVERY 4 HOURS PRN
OUTPATIENT
Start: 2024-05-10

## 2024-05-10 RX ADMIN — CEFAZOLIN SODIUM 2 G: 2 SOLUTION INTRAVENOUS at 11:05

## 2024-05-10 RX ADMIN — HYDROCODONE BITARTRATE AND ACETAMINOPHEN 1 TABLET: 5; 325 TABLET ORAL at 08:05

## 2024-05-10 RX ADMIN — OXYCODONE HYDROCHLORIDE 5 MG: 5 TABLET ORAL at 12:05

## 2024-05-10 RX ADMIN — OXYCODONE HYDROCHLORIDE 5 MG: 5 TABLET ORAL at 04:05

## 2024-05-10 RX ADMIN — OXYCODONE HYDROCHLORIDE 5 MG: 5 TABLET ORAL at 08:05

## 2024-05-10 RX ADMIN — ACETAMINOPHEN 1000 MG: 10 INJECTION, SOLUTION INTRAVENOUS at 05:05

## 2024-05-10 RX ADMIN — ONDANSETRON 4 MG: 2 INJECTION INTRAMUSCULAR; INTRAVENOUS at 08:05

## 2024-05-10 RX ADMIN — ALBUMIN (HUMAN) 12.5 G: 12.5 INJECTION, SOLUTION INTRAVENOUS at 12:05

## 2024-05-10 RX ADMIN — OXYCODONE HYDROCHLORIDE 10 MG: 10 TABLET ORAL at 11:05

## 2024-05-10 RX ADMIN — METOPROLOL TARTRATE 12.5 MG: 25 TABLET, FILM COATED ORAL at 08:05

## 2024-05-10 RX ADMIN — ATORVASTATIN CALCIUM 80 MG: 40 TABLET, FILM COATED ORAL at 08:05

## 2024-05-10 RX ADMIN — SUCRALFATE 1 G: 1 TABLET ORAL at 05:05

## 2024-05-10 RX ADMIN — ENOXAPARIN SODIUM 40 MG: 40 INJECTION SUBCUTANEOUS at 05:05

## 2024-05-10 RX ADMIN — MUPIROCIN: 20 OINTMENT TOPICAL at 08:05

## 2024-05-10 RX ADMIN — FOLIC ACID 1 MG: 1 TABLET ORAL at 08:05

## 2024-05-10 RX ADMIN — DOCUSATE SODIUM 100 MG: 100 CAPSULE, LIQUID FILLED ORAL at 08:05

## 2024-05-10 RX ADMIN — SUCRALFATE 1 G: 1 TABLET ORAL at 08:05

## 2024-05-10 RX ADMIN — FAMOTIDINE 20 MG: 10 INJECTION, SOLUTION INTRAVENOUS at 08:05

## 2024-05-10 RX ADMIN — OXYCODONE HYDROCHLORIDE 10 MG: 10 TABLET ORAL at 04:05

## 2024-05-10 RX ADMIN — ASPIRIN 81 MG: 81 TABLET, COATED ORAL at 08:05

## 2024-05-10 NOTE — PROGRESS NOTES
Inpatient Nutrition Evaluation    Admit Date: 5/5/2024   Total duration of encounter: 5 days   Patient Age: 68 y.o.    Nutrition Recommendation/Prescription     Continue cardiac diet as tolerated  Monitor need for ONS  Continue bowel regimen and antiemetic  Monitor labs, intake and weight    Nutrition Assessment     Chart Review    Reason Seen: length of stay    Malnutrition Screening Tool Results   Have you recently lost weight without trying?: No  Have you been eating poorly because of a decreased appetite?: No   MST Score: 0   Diagnosis:  POD 1 s/p CABG    Relevant Medical History: HTN, HLD    Scheduled Medications:  acetaminophen, 1,000 mg, Q8H  aspirin, 81 mg, Daily  atorvastatin, 80 mg, QHS  docusate sodium, 100 mg, BID  enoxparin, 40 mg, Daily  famotidine (PF), 20 mg, Daily  folic acid, 1 mg, Daily  metoprolol tartrate, 12.5 mg, BID  mupirocin, , BID  sucralfate, 1 g, QID (AC & HS)    Continuous Infusions:  dexmedeTOMIDine (Precedex) infusion (titrating), Last Rate: 0.6 mcg/kg/hr (05/09/24 1325)  dextrose 5 % and 0.45 % NaCl, Last Rate: Stopped (05/10/24 0552)  EPINEPHrine, Last Rate: Stopped (05/09/24 2200)  insulin regular 1 units/mL infusion orderable (CTS POST-OP), Last Rate: 2 Units/hr (05/10/24 0616)  loperamide    PRN Medications:   Current Facility-Administered Medications:     0.9%  NaCl infusion (for blood administration), , Intravenous, Q24H PRN    acetaminophen, 650 mg, Oral, Q4H PRN    albumin human 5%, 12.5 g, Intravenous, PRN    calcium gluconate IVPB, 1 g, Intravenous, PRN    calcium gluconate IVPB, 2 g, Intravenous, PRN    calcium gluconate IVPB, 3 g, Intravenous, PRN    cefUROXime (ZINACEF) IVPB, 1.5 g, Intravenous, On Call Procedure    dextrose 10%, 12.5 g, Intravenous, PRN    dextrose 10%, 25 g, Intravenous, PRN    hydrALAZINE, 10 mg, Intravenous, Q4H PRN    HYDROcodone-acetaminophen, 1 tablet, Oral, Q4H PRN    HYDROcodone-acetaminophen, 1 tablet, Oral, Q4H PRN    lactulose 10 gram/15 ml,  20 g, Oral, Q6H PRN    loperamide, 2 mg, Oral, Continuous PRN    magnesium sulfate IVPB, 2 g, Intravenous, PRN    magnesium sulfate IVPB, 4 g, Intravenous, PRN    metoclopramide, 5 mg, Intravenous, Q6H PRN    morphine, 2 mg, Intravenous, Q4H PRN    morphine, 4 mg, Intravenous, Q4H PRN    mupirocin, , Nasal, On Call Procedure    nitroGLYCERIN, 0.4 mg, Sublingual, Q5 Min PRN    ondansetron, 4 mg, Intravenous, Q8H PRN    ondansetron, 4 mg, Intravenous, Q4H PRN    oxyCODONE, 5 mg, Oral, Q4H PRN    oxyCODONE, 10 mg, Oral, Q4H PRN    potassium chloride in water, 20 mEq, Intravenous, PRN    potassium chloride in water, 40 mEq, Intravenous, PRN    potassium chloride in water, 60 mEq, Intravenous, PRN    sodium chloride 0.9%, 10 mL, Intravenous, PRN    sodium chloride 0.9%, 10 mL, Intravenous, PRN    Recent Labs   Lab 05/05/24  1156 05/05/24  1256 05/07/24  0545 05/08/24  0421 05/09/24  0403 05/09/24  1142 05/09/24  1304 05/09/24  1308 05/09/24  1311 05/09/24  1329 05/09/24  1411 05/09/24  1858 05/10/24  0308     --  140 139 141  --   --  141  --   --   --   --  136   K 4.1  --  3.9 4.4 4.0  --   --  4.1  --   --   --  3.4* 4.0   CALCIUM 10.1  --  10.0 10.2 9.8  --   --  10.2  --   --   --   --  8.0*   MG  --   --  2.10 2.10 2.10  --   --   --   --   --   --  2.10  --    CHLORIDE 110*  --  109* 110* 111*  --   --  117*  --   --   --   --  111*   CO2 23  --  26 24 23  --   --  24  --   --   --   --  21*   BUN 10.3  --  11.8 11.3 13.7  --   --  11.9  --   --   --   --  7.5*   CREATININE 0.78  --  0.78 0.82 0.77  --   --  0.68  --   --   --   --  0.67   EGFRNORACEVR >60  --  >60 >60 >60  --   --  >60  --   --   --   --  >60   GLUCOSE 93  --  99 102 106  --   --  98  --   --   --   --  111   BILITOT 0.7  --  0.9  --  0.7  --   --   --   --   --   --   --  1.0   ALKPHOS 89  --  82  --  81  --   --   --   --   --   --   --  36*   ALT 22  --  17  --  13  --   --   --   --   --   --   --  11   AST 28  --  21  --  18  --   --    "--   --   --   --   --  34   ALBUMIN 3.9  --  3.7  --  3.6  --   --   --   --   --   --   --  3.4   TRIG  --  54  --   --   --   --   --   --   --   --   --   --   --    WBC 3.96*  --  4.54 4.43* 5.57  --   --  5.91  --   --   --   --  7.85   HGB 13.4  --  13.7 14.0 13.5  --   --  9.1*  --   --   --  8.6* 10.4*   HCT 39.9  --  42.0 42.4 40.5   < > 21* 28.0* 22* 22* 26* 25.5* 31.6*    < > = values in this interval not displayed.     Nutrition Orders:  Diet Heart Healthy No Concentrated Sweets      Appetite/Oral Intake: fair/50-75% of meals  Factors Affecting Nutritional Intake: decreased appetite  Food/Moravian/Cultural Preferences: none reported  Food Allergies: no known food allergies  Last Bowel Movement: 24  Wound(s):      Comments    5/10: Pt and RN reports good intake of CL this AM, diet advanced to cardiac for lunch. Appetite prior was decreased x 3 days. Declined ONS at this time. Reports nausea today, last BM ; meds noted. UBW ~173# and denies unintentional weight loss PTA. +5.7 kg weight change noted since admission.    Anthropometrics    Height: 5' 3" (160 cm), Height Method: Stated  Last Weight: 84.4 kg (186 lb 1.1 oz) (05/10/24 0546), Weight Method: Standard Scale  BMI (Calculated): 33  BMI Classification: obese grade I (BMI 30-34.9)     Ideal Body Weight (IBW), Female: 115 lb     % Ideal Body Weight, Female (lb): 150.87 %                    Usual Body Weight (UBW), k.7 kg  % Usual Body Weight: 107.47     Usual Weight Provided By: patient and patient denies unintentional weight loss    Wt Readings from Last 5 Encounters:   05/10/24 84.4 kg (186 lb 1.1 oz)     Weight Change(s) Since Admission: +5.7 kg  Wt Readings from Last 1 Encounters:   05/10/24 0546 84.4 kg (186 lb 1.1 oz)   24 0442 78.7 kg (173 lb 6.4 oz)   24 2234 78.7 kg (173 lb 8 oz)   24 1139 82.1 kg (181 lb)   Admit Weight: 82.1 kg (181 lb) (24 1139), Weight Method: Standard Scale    Patient Education "     Not applicable.    Nutrition Goals & Monitoring     Dietitian will monitor: food and beverage intake, weight, and gastrointestinal profile    Nutrition Risk/Follow-Up: low (follow-up in 5-7 days)  Patients assigned 'low nutrition risk' status do not qualify for a full nutritional assessment but will be monitored and re-evaluated in a 5-7 day time period. Please consult if re-evaluation needed sooner.

## 2024-05-10 NOTE — PROGRESS NOTES
CT SURGERY PROGRESS NOTE  Tanvi Miranda  68 y.o.  1955    Patients Procedure: Procedure(s) (LRB):  CORONARY ARTERY BYPASS GRAFT (CABG) (N/A)  SURGICAL PROCUREMENT, VEIN, ENDOSCOPIC (N/A)  Left atrial appendage closure device (N/A)    Subjective  Interval History: Patient is postoperative day 1 from CABG x 2 with EVH and ligation of left atrial appendage.  Transfused 2 units of PRBC yesterday evening with appropriate response.  This morning she is sitting up in chair complaining of some nausea and 1 episode of vomting.     Review of Systems   Constitutional:  Negative for chills, fever and malaise/fatigue.   Respiratory:  Negative for cough, shortness of breath and wheezing.    Cardiovascular:  Positive for chest pain (incisional). Negative for palpitations and leg swelling.   Gastrointestinal:  Positive for nausea. Negative for vomiting.       Medication List  Infusions   dexmedeTOMIDine (Precedex) infusion (titrating)  0-1.4 mcg/kg/hr Intravenous Continuous 11.805 mL/hr at 05/09/24 1325 0.6 mcg/kg/hr at 05/09/24 1325    dextrose 5 % and 0.45 % NaCl   Intravenous Continuous   Paused at 05/10/24 0552    EPINEPHrine  0-2 mcg/kg/min (Dosing Weight) Intravenous Continuous   Stopped at 05/09/24 2200    insulin regular 1 units/mL infusion orderable (CTS POST-OP)  0-52 Units/hr Intravenous Continuous 2 mL/hr at 05/10/24 0616 2 Units/hr at 05/10/24 0616    loperamide  2 mg Oral Continuous PRN         Scheduled   acetaminophen  1,000 mg Intravenous Q8H    aspirin  81 mg Oral Daily    atorvastatin  80 mg Oral QHS    ceFAZolin (Ancef) IV (PEDS and ADULTS)  2 g Intravenous Q12H    docusate sodium  100 mg Oral BID    famotidine (PF)  20 mg Intravenous Daily    folic acid  1 mg Oral Daily    metoprolol tartrate  12.5 mg Oral BID    mupirocin   Nasal BID    sucralfate  1 g Oral QID (AC & HS)       Objective:  Recent Vitals:  Temp:  [96.6 °F (35.9 °C)-99 °F (37.2 °C)] 99 °F (37.2 °C)  Pulse:  [] 97  Resp:  [15-30]  20  SpO2:  [92 %-100 %] 94 %  BP: ()/(47-87) 98/61  Arterial Line BP: ()/(42-65) 105/42    Physical Exam  Constitutional:       General: She is not in acute distress.     Appearance: She is not ill-appearing.   HENT:      Head: Normocephalic and atraumatic.      Mouth/Throat:      Mouth: Mucous membranes are moist.      Pharynx: Oropharynx is clear.   Cardiovascular:      Rate and Rhythm: Normal rate and regular rhythm.      Pulses: Normal pulses.      Heart sounds: Normal heart sounds. No murmur heard.     No friction rub. No gallop.   Pulmonary:      Effort: Pulmonary effort is normal. No respiratory distress.      Breath sounds: Normal breath sounds. No wheezing, rhonchi or rales.   Abdominal:      General: There is no distension.      Palpations: Abdomen is soft.   Musculoskeletal:         General: Normal range of motion.      Cervical back: Normal range of motion and neck supple.      Right lower leg: No edema.      Left lower leg: No edema.   Skin:     General: Skin is warm and dry.      Comments: Median sternotomy incision dressed - c/d/I  Ct x 2 in place.  Dressed. C/d/i   Neurological:      General: No focal deficit present.      Mental Status: She is alert and oriented to person, place, and time.   Psychiatric:         Mood and Affect: Mood normal.         Behavior: Behavior normal.          I/O last 24 hrs:  Intake/Output - Last 3 Shifts         05/08 0700  05/09 0659 05/09 0700  05/10 0659 05/10 0700  05/11 0659    P.O. 480 120     I.V. (mL/kg)  1812.9 (21.5)     Blood  522.9     IV Piggyback  2010.4     Total Intake(mL/kg) 480 (6.1) 4466.2 (52.9)     Urine (mL/kg/hr)  2575 (1.3)     Stool  0     Chest Tube  490     Total Output  3065     Net +480 +1401.2            Urine Occurrence 2 x 1 x     Stool Occurrence 0 x 1 x             Labs  CBC:   Recent Labs   Lab 05/10/24  0308   WBC 7.85   RBC 3.59*   HGB 10.4*   HCT 31.6*   PLT 98*   MCV 88.0   MCH 29.0   MCHC 32.9*     CMP:   Recent Labs    Lab 05/10/24  0308   CALCIUM 8.0*   ALBUMIN 3.4      K 4.0   CO2 21*   BUN 7.5*   CREATININE 0.67   ALKPHOS 36*   ALT 11   AST 34   BILITOT 1.0     LFTs:   Recent Labs   Lab 05/10/24  0308   ALT 11   AST 34   ALKPHOS 36*   BILITOT 1.0   ALBUMIN 3.4     All pertinent labs from the last 24 hours have been reviewed.      Imaging:   CXR: X-Ray Chest AP Portable    Result Date: 5/10/2024  1. Interval removal of endotracheal and enteric tubes. 2. Bibasilar subsegmental atelectasis. Electronically signed by: Neda Cuevas Date:    05/10/2024 Time:    05:56    X-Ray Chest AP Portable    Result Date: 5/9/2024  Postoperative changes Electronically signed by: Garth Dixon Date:    05/09/2024 Time:    15:35   I have reviewed all pertinent imaging results/findings within the past 24 hours.        ASSESSMENT/PLAN:    Multivessel coronary artery disease  HTN  HLD  Significant family history of early CAD    -s/p CABG x 2 with EVH and LLAA  -L ct outptu: 450 cc since surgery.  Mediastinal ct output: 150 cc since surgery.  Serosanguinous.  Continue to suction.  -CXR with bibasilar subsegmental atelectasis.  -OOB.  Mobilize.  Aggressive IS use  -H&H stable: 10.4 / 31.6  -Renal indices stable. BUN / Cr 7.5 / 0.67.  UOP adequate.   -OK to downgrade to telemetry     Case and plan of care discussed with Dr. Albin London, TYLER

## 2024-05-10 NOTE — PLAN OF CARE
Problem: Adult Inpatient Plan of Care  Goal: Plan of Care Review  Outcome: Progressing  Goal: Patient-Specific Goal (Individualized)  Outcome: Progressing  Goal: Absence of Hospital-Acquired Illness or Injury  Outcome: Progressing  Goal: Optimal Comfort and Wellbeing  Outcome: Progressing  Goal: Readiness for Transition of Care  Outcome: Progressing     Problem: Wound  Goal: Optimal Coping  Outcome: Progressing  Goal: Optimal Functional Ability  Outcome: Progressing  Goal: Absence of Infection Signs and Symptoms  Outcome: Progressing  Goal: Improved Oral Intake  Outcome: Progressing  Goal: Optimal Pain Control and Function  Outcome: Progressing  Goal: Skin Health and Integrity  Outcome: Progressing  Goal: Optimal Wound Healing  Outcome: Progressing     Problem: Infection  Goal: Absence of Infection Signs and Symptoms  Outcome: Progressing     Problem: Fall Injury Risk  Goal: Absence of Fall and Fall-Related Injury  Outcome: Progressing

## 2024-05-10 NOTE — PROGRESS NOTES
05/10/24 1255   Pre Exercise Vitals   /68   Pulse 90   Supplemental O2? No   SpO2 96 %   During Exercise Vitals   Pulse 96   Supplemental O2? No   SpO2 94 %   Distance Walked 50 feet   Post Exercise Vitals   /62   Pulse 85   Supplemental O2? No   SpO2 92 %   Modality   Modality   (rollator)     Communicated with nurse pre and post walk. Performed incentive spirometer. Max 750. Min assist x2 from sitting to standing. Sternal precautions maintained. Gait steady and slow. Tolerated well. Assisted back to bed. All needs within reach.

## 2024-05-10 NOTE — PROGRESS NOTES
[facility] - [facility]  Pulmonary Critical Care Note    Patient Name: Tanvi Miranda  MRN: [id number]  Admission Date: 5/5/2024  Hospital Length of Stay: 5 days  Code Status: Full Code  Attending Provider: Nehemias Talamantes MD  Primary Care Provider: Rupinder, Primary Doctor     Subjective:     HPI: Ms. Miranda is a 68 y.o. female with PMH  HLD, HTN, and family history of early CAD who presented to [facility] ER on 5/5/24 complaining of left sided chest pain associated with fatigue and CEBALLOS for several weeks. She recently had a PET with Dr. Doe, had unremarkable ED workup and underwent LHC on 5/6/24 which showed multivessel CAD. On 5/9/24 patient underwent CABG x2 LIMA to LAD and saphenous graft to RCA Streaker with ligation of KISHORE which she tolerated well. EF estimated to be 55%.     Upon arrival to ICU, patient is NSR HR 72, RR 20, sats 100%, /67 on art line correlating to cuff, on Epi 0.04 mcg/kg/min, insulin gtt, and IVF. Mediastinal and L pleural CT to suction, scant bloody drainage in L and 20 cc bloody drainage in mediastinal. CXR shows grossly normal lungs with appropriate CVC placement in SVC and chest tubes and ETT appropriately placed.      Pump time: 44 min  Cross clamp time: 36 min  EBL: 800  mL  Cell saver: 2277 mL  Autologous blood: 387 mL  Blood products: none         Hospital Course/Significant events:  5/9/24: CABG x2 and ligation of KISHORE      24 Hour Interval History:  Patient seen interview this morning, sitting in chair in no acute distress.  450 cc in L, 140 Mediastinal. Emphasis placed on incentive spirometry. Post operative pain persist but stable.    History reviewed. No pertinent past medical history.    Past Surgical History:   Procedure Laterality Date    LEFT HEART CATHETERIZATION Left 5/6/2024    Procedure: Left heart cath;  Surgeon: Celso Watts Jr., MD;  Location: [facility] CATH LAB;  Service: Cardiology;  Laterality: Left;       Social History     Socioeconomic History     Marital status:    Tobacco Use    Smoking status: Never    Smokeless tobacco: Never     Social Determinants of Health     Financial Resource Strain: Patient Declined (5/6/2024)    Overall Financial Resource Strain (CARDIA)     Difficulty of Paying Living Expenses: Patient declined   Food Insecurity: No Food Insecurity (5/7/2024)    Hunger Vital Sign     Worried About Running Out of Food in the Last Year: Never true     Ran Out of Food in the Last Year: Never true   Transportation Needs: No Transportation Needs (5/7/2024)    TRANSPORTATION NEEDS     Transportation : No   Stress: Patient Declined (5/6/2024)    [employer] - Occupational Stress Questionnaire     Feeling of Stress : Patient declined   Housing Stability: Low Risk  (5/7/2024)    Housing Stability Vital Sign     Unable to Pay for Housing in the Last Year: No     Homeless in the Last Year: No           Objective:     Current Outpatient Medications   Medication Instructions    clopidogreL (PLAVIX) 75 mg, Oral, Daily    lisinopriL 10 mg, Oral, Daily    rosuvastatin (CRESTOR) 40 mg, Oral, Daily       Current Inpatient Medications   acetaminophen  1,000 mg Intravenous Q8H    aspirin  325 mg Oral Once    aspirin  81 mg Oral Daily    atorvastatin  80 mg Oral QHS    ceFAZolin (Ancef) IV (PEDS and ADULTS)  2 g Intravenous Q12H    docusate sodium  100 mg Oral BID    famotidine (PF)  20 mg Intravenous Daily    folic acid  1 mg Oral Daily    metoprolol tartrate  12.5 mg Oral BID    mupirocin   Nasal BID    sucralfate  1 g Oral QID (AC & HS)           Intake/Output Summary (Last 24 hours) at 5/10/2024 0816  Last data filed at 5/10/2024 0616  Gross per 24 hour   Intake 4466.22 ml   Output 3065 ml   Net 1401.22 ml       Review of Systems   Constitutional:  Negative for chills, diaphoresis and fever.   Eyes:  Negative for blurred vision.   Respiratory:  Negative for cough, shortness of breath and wheezing.    Cardiovascular:  Positive  for chest pain (post operative chest related pain). Negative for palpitations, leg swelling and PND.   Gastrointestinal:  Negative for abdominal pain, constipation, diarrhea, nausea and vomiting.   Musculoskeletal:  Negative for falls and myalgias.   Skin:  Negative for rash.   Neurological:  Negative for dizziness, seizures, loss of consciousness, weakness and headaches.   All other systems reviewed and are negative.       Vital Signs (Most Recent):  Temp: 99 °F (37.2 °C) (05/10/24 0400)  Pulse: 97 (05/10/24 0600)  Resp: (!) 30 (05/10/24 0600)  BP: 98/61 (05/10/24 0600)  SpO2: (!) 94 % (05/10/24 0600)  Body mass index is 32.96 kg/m².  Weight: 84.4 kg (186 lb 1.1 oz) Vital Signs (24h Range):  Temp:  [96.6 °F (35.9 °C)-99 °F (37.2 °C)] 99 °F (37.2 °C)  Pulse:  [] 97  Resp:  [15-30] 30  SpO2:  [92 %-100 %] 94 %  BP: ()/(47-87) 98/61  Arterial Line BP: ()/(42-65) 105/42     Physical Exam  Vitals and nursing note reviewed.   Constitutional:       General: She is not in acute distress.     Appearance: Normal appearance. She is obese. She is not ill-appearing or toxic-appearing.   HENT:      Head: Normocephalic.   Eyes:      General: No scleral icterus.     Extraocular Movements: Extraocular movements intact.      Pupils: Pupils are equal, round, and reactive to light.   Cardiovascular:      Rate and Rhythm: Normal rate and regular rhythm.      Pulses: Normal pulses.      Heart sounds: Normal heart sounds. No murmur heard.  Pulmonary:      Effort: Pulmonary effort is normal. No respiratory distress.      Breath sounds: Rales (bibasilar crackles noted, mild.) present. No wheezing.   Abdominal:      General: Abdomen is flat.      Palpations: Abdomen is soft.      Tenderness: There is no right CVA tenderness or left CVA tenderness.   Musculoskeletal:         General: No swelling or tenderness. Normal range of motion.      Cervical back: Normal range of motion. No rigidity.      Right lower leg: Edema  present.      Left lower leg: Edema present.      Comments: BLE edema noted up to knees bilaterally, wound dressing on LLE s/p vein graft.   Skin:     General: Skin is warm.      Capillary Refill: Capillary refill takes less than 2 seconds.      Coloration: Skin is not jaundiced or pale.      Findings: No lesion or rash.   Neurological:      Mental Status: She is alert.           Mechanical ventilation support:  Vent Mode: CPAP / PSV (05/09/24 1756)  Ventilator Initiated: Yes (05/09/24 1510)  Set Rate: 20 BPM (05/09/24 1510)  Vt Set: 500 mL (05/09/24 1510)  Pressure Support: 10 cmH20 (05/09/24 1756)  PEEP/CPAP: 5 cmH20 (05/09/24 1756)  Oxygen Concentration (%): 30 (05/09/24 1756)  Peak Airway Pressure: 20 cmH20 (05/09/24 1510)  Total Ve: 10.3 L/m (05/09/24 1756)  F/VT Ratio<105 (RSBI): (!) 53.49 (05/09/24 1756)    Lines/Drains/Airways       Central Venous Catheter Line  Duration             Percutaneous Central Line - Double Lumen  05/09/24 0830 <1 day              Drain  Duration                  Chest Tube 05/09/24 Left 28 Fr. 1 day         Urethral Catheter 05/09/24 1 day         Chest Tube 05/09/24 1405 Mediastinal 28 Fr. <1 day              Airway  Duration                  Airway - Non-Surgical 05/09/24 1126 <1 day              Arterial Line  Duration             Arterial Line 05/09/24 1134 Left Radial <1 day              Peripheral Intravenous Line  Duration                  Peripheral IV - Single Lumen 05/05/24 1201 20 G Anterior;Proximal;Right Forearm 4 days                    Significant Labs:    Lab Results   Component Value Date    WBC 7.85 05/10/2024    HGB 10.4 (L) 05/10/2024    HCT 31.6 (L) 05/10/2024    MCV 88.0 05/10/2024    PLT 98 (L) 05/10/2024         BMP  Lab Results   Component Value Date     05/10/2024    K 4.0 05/10/2024    CO2 21 (L) 05/10/2024    BUN 7.5 (L) 05/10/2024    CREATININE 0.67 05/10/2024    CALCIUM 8.0 (L) 05/10/2024       [facility]  Recent Labs   Lab 05/09/24  4206  05/09/24  1549 05/09/24  1841   PH 7.378   < > 7.320*   PO2 314*   < > 111.0*   PCO2 38.3   < > 43.0   HCO3 22.5*   < > 22.2   BE -3*  --   --     < > = values in this interval not displayed.           Significant Imaging:  I have reviewed all pertinent imaging within the past 24 hours.        Assessment/Plan:     Assessment  CAD s/p CABG x2 LIMA to LAD and saphenous graft to RCA Streaker with ligation of KISHORE on 5/9/24  HTN  HLD        Plan  Admit to ICU  Continue post-CABG protocol  Wean MV per protocol, extubate when appropriate  CV surgery following for management of post-op care and chest tubes  Resume home PO meds when appropriate     DVT Prophylaxis: SCDs  GI Prophylaxis: Pepcid     45 minutes of critical care was time spent personally by me on the following activities: development of treatment plan with patient or surrogate and bedside caregivers, discussions with consultants, evaluation of patient's response to treatment, examination of patient, ordering and performing treatments and interventions, ordering and review of laboratory studies, ordering and review of radiographic studies, pulse oximetry, re-evaluation of patient's condition.  This critical care time did not overlap with that of any other provider or involve time for any procedures.      Elliot Eubanks MD  Internal Medicine - PGY-2    Pulmonary Critical Care Medicine  [facility] - [facility] ICU

## 2024-05-10 NOTE — PROGRESS NOTES
"[facility]    Cardiology  Progress Note    Patient Name: Tanvi Miranda  MRN: [id number]  Admission Date: 5/5/2024  Hospital Length of Stay: 5 days  Code Status: Full Code   Attending Physician: Nehemias Talamantes MD   Primary Care Physician: Rupinder, Primary Doctor  Expected Discharge Date:   Principal Problem:<principal problem not specified>    Subjective:     Brief HPI/Hospital Course: 68-year-old female that is known to Dr. Doe with a PMHx of HLD, HTN, GERD and family Hx of early CAD. She presented to [facility] ED on 5.5.24 with complaints of left sided chest pain. Patient recently had a VAN/PET and ECHO completed by Dr. Doe and was planned to follow up 5.9.24 for results. She reports associated SOB and fatugue with exertion. She also report radiating left arm pain. Her last [facility] was back in 2018. ED Course: HR 66, /84, RR 20, Temp 98.9F SpO2 100% on RA. Lab work showed: WBC 3.98, K 4.1, BUN/Cr 10.3/0.78, Ca 10.1, AST/ALT 28/23, BNP 12.7, Trop: <0.010 X2, EKG shows no acute ST changes. [facility] will admit for chest pain.      5.7.24:  NAD. VSS. No complaints of CP/SOB/Palps. "I feel okay"  5.8.24: NAD. VSS. No complaints of CP/SOB/Palps. "I feel okay" CV surgery planning CABG for tomorrow. Resting comfortably in bed.  5.9.24: NAD. Intubated/Sedated. VSS. On Epi and Precedex   5.10.24: NAD. Sitting in Bedside Chair. Denies SOB and Palps. + CP/Incisional. Insulin Drip per CVS. "I am ok, just some pain."     PMH: HLD, HTN, GERD and family Hx of early CAD  PSH: Colonoscopy, tonsillectomy, Hysterectomy   Family History: Mother: CAD s/p PCI, Alzheimers, PAD, Brother & Sister: CAD  Social History: Denies Tobacco, illicit drug, or ETOH use.      Previous Cardiac Diagnostics:   [facility] (5.6.24):  There is significant coronary artery disease. Mid Left Anterior Descending has a 60-70% stenosis. Instantaneous wave-free ratio (iFR) was performed on the lesion, and found to be non-hemodynamically significant at 0.96. 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 Ostial Left Circumflex has a 95% stenosis. The distal vessel is supplied via left-to-right collaterals from the LAD (septal perforating branches). Prox Right Coronary Artery has a calcified 90% stenosis. The vessel is moderately tortuous. The distal vessel is supplied via left-to-right collaterals from the LAD (septal perforating branches). LVEDP is normal at 13 mmHg.    [facility] PET (5.2.24):  The left ventricular cavity is noted to be normal on the stress studies. The stress left ventricular ejection fraction was calculated to be 63% and left ventricular global function is normal. The rest left ventricular cavity is noted to be normal. The rest left ventricular ejection fraction was calculated to be 50% and rest left ventricular global function is normal.   When compared to the resting ejection fraction (50%), the stress ejection fraction (63%) has increased.   There was a rise in myocardial blood flow between rest and stress.  Global myocardial blood flow reserve was 2.88.  Myocardial blood flow reserve is reduced in the inferior territory which is suggestive of flow limiting stenosis in this territory.  *FINAL READ NOT DONE*     ECHO (5.2.24):  The study quality is average.   The left ventricle is normal in size. Global left ventricular systolic function is normal. The left ventricular ejection fraction is 60%.   Normal appearing valvular structures and function are within study limits.   The pulmonary artery systolic pressure is 10 mmHg. The pulmonary artery appears to be normal.     [facility] ( 8.3.18):  LAD: arrises right from the aorta there is no LM. Significant calcification present in LAD. There is a 40-50% stenosis in the midpoint of the LAD. D2 is moderate in size with 20-30% stenosis   RCA: large prominent vessel. It is dominant. 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   Cardiovascular:  Positive for chest pain (Incisional). Negative for leg swelling and palpitations.   Respiratory:  Negative for shortness of breath.    All other systems reviewed and are negative.    Objective:     Vital Signs (Most Recent):  Temp: 99 °F (37.2 °C) (05/10/24 0400)  Pulse: 97 (05/10/24 0600)  Resp: 20 (05/10/24 0821)  BP: 98/61 (05/10/24 0600)  SpO2: (!) 94 % (05/10/24 0600) Vital Signs (24h Range):  Temp:  [96.6 °F (35.9 °C)-99 °F (37.2 °C)] 99 °F (37.2 °C)  Pulse:  [] 97  Resp:  [15-30] 20  SpO2:  [92 %-100 %] 94 %  BP: ()/(47-87) 98/61  Arterial Line BP: ()/(42-65) 105/42   Weight: 84.4 kg (186 lb 1.1 oz)  Body mass index is 32.96 kg/m².  SpO2: (!) 94 %       Intake/Output Summary (Last 24 hours) at 5/10/2024 1038  Last data filed at 5/10/2024 0616  Gross per 24 hour   Intake 4466.22 ml   Output 3065 ml   Net 1401.22 ml     Lines/Drains/Airways       Central Venous Catheter Line  Duration             Percutaneous Central Line - Double Lumen  05/09/24 0830 1 day              Drain  Duration                  Chest Tube 05/09/24 Left 28 Fr. 1 day         Urethral Catheter 05/09/24 1 day         Chest Tube 05/09/24 1405 Mediastinal 28 Fr. <1 day              Airway  Duration                  Airway - Non-Surgical 05/09/24 1126 <1 day              Arterial Line  Duration             Arterial Line 05/09/24 1134 Left Radial <1 day              Peripheral Intravenous Line  Duration                  Peripheral IV - Single Lumen 05/05/24 1201 20 G Anterior;Proximal;Right Forearm 4 days                  Significant Labs:   Recent Results (from the past 72 hour(s))   CV Ultrasound Bilateral Doppler Carotid    Collection Time: 05/07/24  12:02 PM   Result Value Ref Range    Right CCA prox sys 80 cm/s    Right CCA prox shaver 9 cm/s    Right CCA dist sys 71 cm/s    Right CCA dist shaver 15 cm/s    Right ICA prox sys 58 cm/s    Right ICA prox shaver 14 cm/s    Right ICA mid sys 49 cm/s    Right ICA mid shaver 13 cm/s    Right ECA sys 86 cm/s    Right ECA shaver 11 cm/s    Right ICA/CCA ratio 0.82     Right Highest ICA 58.00     Right Highest EDV 14.00     Right Highest CCA 80     Left CCA prox sys 85 cm/s    Left CCA prox shaver 20 cm/s    Left CCA dist sys 71 cm/s    Left CCA dist shaver 18 cm/s    Left ICA prox sys 72 cm/s    Left ICA prox shaver 18 cm/s    Left ICA mid sys 70 cm/s    Left ICA mid shaver 18 cm/s    Left ICA dist sys 82 cm/s    Left ICA dist shaver 25 cm/s    Left ECA sys 56 cm/s    Left ECA shaver 7 cm/s    Left vertebral sys 44 cm/s    Left vertebral shaver 0 cm/s    Left ICA/CCA ratio 1.15     Left Highest ICA 82.00     LT Highest EDV 25.00     Left Highest CCA 85     Right vertebral sys 40.20 cm/s    Right vertebral shaver 9.94 cm/s   Basic Metabolic Panel    Collection Time: 05/08/24  4:21 AM   Result Value Ref Range    Sodium 139 136 - 145 mmol/L    Potassium 4.4 3.5 - 5.1 mmol/L    Chloride 110 (H) 98 - 107 mmol/L    CO2 24 23 - 31 mmol/L    Glucose 102 82 - 115 mg/dL    Blood Urea Nitrogen 11.3 9.8 - 20.1 mg/dL    Creatinine 0.82 0.55 - 1.02 mg/dL    BUN/Creatinine Ratio 14     Calcium 10.2 8.4 - 10.2 mg/dL    Anion Gap 5.0 mEq/L    eGFR >60 mL/min/1.73/m2   Magnesium    Collection Time: 05/08/24  4:21 AM   Result Value Ref Range    Magnesium Level 2.10 1.60 - 2.60 mg/dL   CBC with Differential    Collection Time: 05/08/24  4:21 AM   Result Value Ref Range    WBC 4.43 (L) 4.50 - 11.50 x10(3)/mcL    RBC 4.77 4.20 - 5.40 x10(6)/mcL    Hgb 14.0 12.0 - 16.0 g/dL    Hct 42.4 37.0 - 47.0 %    MCV 88.9 80.0 - 94.0 fL    MCH 29.4 27.0 - 31.0 pg    MCHC 33.0 33.0 - 36.0 g/dL    RDW 12.9 11.5 - 17.0 %    Platelet 176 130 - 400 x10(3)/mcL    MPV 10.0 7.4 - 10.4  fL    Neut % 40.4 %    Lymph % 44.2 %    Mono % 10.2 %    Eos % 4.1 %    Basophil % 0.9 %    Lymph # 1.96 0.6 - 4.6 x10(3)/mcL    Neut # 1.79 (L) 2.1 - 9.2 x10(3)/mcL    Mono # 0.45 0.1 - 1.3 x10(3)/mcL    Eos # 0.18 0 - 0.9 x10(3)/mcL    Baso # 0.04 <=0.2 x10(3)/mcL    IG# 0.01 0 - 0.04 x10(3)/mcL    IG% 0.2 %    NRBC% 0.0 %   Protime-INR    Collection Time: 05/08/24  4:33 AM   Result Value Ref Range    PT 13.1 12.5 - 14.5 seconds    INR 1.0 <=1.3   APTT    Collection Time: 05/08/24  4:33 AM   Result Value Ref Range    PTT 67.6 (H) 23.2 - 33.7 seconds   Type & Screen    Collection Time: 05/08/24  4:33 AM   Result Value Ref Range    Group & Rh O NEG     Indirect Brenna GEL NEG     Specimen Outdate 05/11/2024 23:59    Prepare RBC 4 Units; ON CALL TO OR    Collection Time: 05/08/24  4:33 AM   Result Value Ref Range    UNIT NUMBER [id number]     UNIT ABO/RH O NEG     DISPENSE STATUS Released     Unit Expiration [id number]     Product Code L3628H99     Unit Blood Type Code 9500     CROSSMATCH INTERPRETATION Compatible     UNIT NUMBER [id number]     UNIT ABO/RH O NEG     DISPENSE STATUS Released     Unit Expiration [id number]     Product Code E3335N50     Unit Blood Type Code 9500     CROSSMATCH INTERPRETATION Compatible     UNIT NUMBER [id number]     UNIT ABO/RH O NEG     DISPENSE STATUS Transfused     Unit Expiration [id number]     Product Code U9961D76     Unit Blood Type Code 9500     CROSSMATCH INTERPRETATION Compatible     UNIT NUMBER [id number]     UNIT ABO/RH O NEG     DISPENSE STATUS Transfused     Unit Expiration [id number]     Product Code E1491X12     Unit Blood Type Code 9500     CROSSMATCH INTERPRETATION Compatible    Urinalysis    Collection Time: 05/08/24  4:39 AM   Result Value Ref Range    Color, UA Light-Yellow Yellow, Light-Yellow, Colorless, Straw, Dark-Yellow    Appearance, UA Clear Clear    Specific Gravity, UA 1.020 1.005 - 1.030    pH, UA 5.5 5.0 - 8.5    Protein, UA Negative  Negative    Glucose, UA Normal Negative, Normal    Ketones, UA Negative Negative    Blood, UA Negative Negative    Bilirubin, UA Negative Negative    Urobilinogen, UA Normal 0.2, 1.0, Normal    Nitrites, UA Negative Negative    Leukocyte Esterase, UA Negative Negative    WBC, UA 0-5 None Seen, 0-2, 3-5, 0-5 /HPF    Bacteria, UA None Seen None Seen, Trace /HPF    Squamous Epithelial Cells, UA Trace None Seen /HPF    Mucous, UA Trace (A) None Seen /LPF    RBC, UA 0-5 None Seen, 0-2, 3-5, 0-5 /HPF   MRSA PCR    Collection Time: 05/08/24  4:39 AM   Result Value Ref Range    MRSA PCR SCRN ([facility]) Not Detected Not Detected   ABORH RETYPE    Collection Time: 05/08/24  5:40 AM   Result Value Ref Range    ABORH Retype O NEG    Magnesium    Collection Time: 05/09/24  4:03 AM   Result Value Ref Range    Magnesium Level 2.10 1.60 - 2.60 mg/dL   Comprehensive Metabolic Panel    Collection Time: 05/09/24  4:03 AM   Result Value Ref Range    Sodium 141 136 - 145 mmol/L    Potassium 4.0 3.5 - 5.1 mmol/L    Chloride 111 (H) 98 - 107 mmol/L    CO2 23 23 - 31 mmol/L    Glucose 106 82 - 115 mg/dL    Blood Urea Nitrogen 13.7 9.8 - 20.1 mg/dL    Creatinine 0.77 0.55 - 1.02 mg/dL    Calcium 9.8 8.4 - 10.2 mg/dL    Protein Total 6.3 5.8 - 7.6 gm/dL    Albumin 3.6 3.4 - 4.8 g/dL    Globulin 2.7 2.4 - 3.5 gm/dL    Albumin/Globulin Ratio 1.3 1.1 - 2.0 ratio    Bilirubin Total 0.7 <=1.5 mg/dL    ALP 81 40 - 150 unit/L    ALT 13 0 - 55 unit/L    AST 18 5 - 34 unit/L    eGFR >60 mL/min/1.73/m2   CBC with Differential    Collection Time: 05/09/24  4:03 AM   Result Value Ref Range    WBC 5.57 4.50 - 11.50 x10(3)/mcL    RBC 4.52 4.20 - 5.40 x10(6)/mcL    Hgb 13.5 12.0 - 16.0 g/dL    Hct 40.5 37.0 - 47.0 %    MCV 89.6 80.0 - 94.0 fL    MCH 29.9 27.0 - 31.0 pg    MCHC 33.3 33.0 - 36.0 g/dL    RDW 12.8 11.5 - 17.0 %    Platelet 166 130 - 400 x10(3)/mcL    MPV 9.9 7.4 - 10.4 fL    Neut % 58.3 %    Lymph % 27.8 %    Mono % 9.9 %    Eos % 3.1 %    Basophil  % 0.7 %    Lymph # 1.55 0.6 - 4.6 x10(3)/mcL    Neut # 3.25 2.1 - 9.2 x10(3)/mcL    Mono # 0.55 0.1 - 1.3 x10(3)/mcL    Eos # 0.17 0 - 0.9 x10(3)/mcL    Baso # 0.04 <=0.2 x10(3)/mcL    IG# 0.01 0 - 0.04 x10(3)/mcL    IG% 0.2 %    NRBC% 0.0 %   POCT glucose    Collection Time: 05/09/24  6:38 AM   Result Value Ref Range    POCT Glucose 94 70 - 110 mg/dL   RT Blood Gas    Collection Time: 05/09/24  9:52 AM   Result Value Ref Range    Sample Type Arterial Blood     Sample site Arterial Line     Drawn by SAMINA /raúl     pH, Blood gas 7.360 7.350 - 7.450    pCO2, Blood gas 48.0 (H) 35.0 - 45.0 mmHg    pO2, Blood gas 121.0 (H) 80.0 - 100.0 mmHg    Sodium, Blood Gas 137 137 - 145 mmol/L    Potassium, Blood Gas 3.8 3.5 - 5.0 mmol/L    Calcium Level Ionized 1.30 (H) 1.12 - 1.23 mmol/L    TOC2, Blood gas 28.6 mmol/L    Base Excess, Blood gas 1.00 -2.00 - 2.00 mmol/L    sO2, Blood gas 98.6 %    HCO3, Blood gas 27.1 (H) 22.0 - 26.0 mmol/L    THb, Blood gas 13.7 12 - 16 g/dL    O2 Hb, Blood Gas 96.6 94.0 - 97.0 %    CO Hgb 1.4 0.5 - 1.5 %    Met Hgb 1.0 0.4 - 1.5 %    Allens Test N/A     Oxygen Device, Blood gas Cannula     LPM 2    ISTAT PROCEDURE    Collection Time: 05/09/24 11:41 AM   Result Value Ref Range    POC PH 7.332 (L) 7.35 - 7.45    POC PCO2 40.6 35 - 45 mmHg    POC PO2 52 (LL) 80 - 100 mmHg    POC HCO3 21.5 (L) 24 - 28 mmol/L    POC BE -4 (L) -2 to 2 mmol/L    POC SATURATED O2 84 95 - 100 %    POC Glucose 90 70 - 110 mg/dL    POC Sodium 146 (H) 136 - 145 mmol/L    POC Potassium 4.0 3.5 - 5.1 mmol/L    POC TCO2 23 23 - 27 mmol/L    POC Ionized Calcium 1.38 1.06 - 1.42 mmol/L    POC Hematocrit 39 36 - 54 %PCV    POC HEMOGLOBIN 13 g/dL    Sample ARTERIAL    ISTAT PROCEDURE    Collection Time: 05/09/24 12:43 PM   Result Value Ref Range    POC PH 7.281 (LL) 7.35 - 7.45    POC PCO2 49.3 (H) 35 - 45 mmHg    POC PO2 52 (LL) 80 - 100 mmHg    POC HCO3 23.2 (L) 24 - 28 mmol/L    POC BE -4 (L) -2 to 2 mmol/L    POC SATURATED O2  82 95 - 100 %    POC Glucose 102 70 - 110 mg/dL    POC Sodium 143 136 - 145 mmol/L    POC Potassium 3.7 3.5 - 5.1 mmol/L    POC TCO2 25 23 - 27 mmol/L    POC Ionized Calcium 1.33 1.06 - 1.42 mmol/L    POC Hematocrit 32 (L) 36 - 54 %PCV    POC HEMOGLOBIN 11 g/dL    Sample ARTERIAL    ISTAT PROCEDURE    Collection Time: 05/09/24  1:04 PM   Result Value Ref Range    POC PH 7.398 7.35 - 7.45    POC PCO2 36.0 35 - 45 mmHg    POC PO2 320 (H) 80 - 100 mmHg    POC HCO3 22.1 (L) 24 - 28 mmol/L    POC BE -3 (L) -2 to 2 mmol/L    POC SATURATED O2 100 95 - 100 %    POC Glucose 97 70 - 110 mg/dL    POC Sodium 144 136 - 145 mmol/L    POC Potassium 6.0 (H) 3.5 - 5.1 mmol/L    POC TCO2 23 23 - 27 mmol/L    POC Ionized Calcium 0.94 (L) 1.06 - 1.42 mmol/L    POC Hematocrit 21 (L) 36 - 54 %PCV    POC HEMOGLOBIN 7 g/dL    Sample ARTERIAL     FiO2 60    Basic Metabolic Panel    Collection Time: 05/09/24  1:08 PM   Result Value Ref Range    Sodium 141 136 - 145 mmol/L    Potassium 4.1 3.5 - 5.1 mmol/L    Chloride 117 (H) 98 - 107 mmol/L    CO2 24 23 - 31 mmol/L    Glucose 98 82 - 115 mg/dL    Blood Urea Nitrogen 11.9 9.8 - 20.1 mg/dL    Creatinine 0.68 0.55 - 1.02 mg/dL    BUN/Creatinine Ratio 18     Calcium 10.2 8.4 - 10.2 mg/dL    Anion Gap 0.0 mEq/L    eGFR >60 mL/min/1.73/m2   Protime-INR    Collection Time: 05/09/24  1:08 PM   Result Value Ref Range    PT 19.5 (H) 12.5 - 14.5 seconds    INR 1.7 (H) <=1.3   APTT    Collection Time: 05/09/24  1:08 PM   Result Value Ref Range    PTT 43.8 (H) 23.2 - 33.7 seconds   CBC with Differential    Collection Time: 05/09/24  1:08 PM   Result Value Ref Range    WBC 5.91 4.50 - 11.50 x10(3)/mcL    RBC 3.07 (L) 4.20 - 5.40 x10(6)/mcL    Hgb 9.1 (L) 12.0 - 16.0 g/dL    Hct 28.0 (L) 37.0 - 47.0 %    MCV 91.2 80.0 - 94.0 fL    MCH 29.6 27.0 - 31.0 pg    MCHC 32.5 (L) 33.0 - 36.0 g/dL    RDW 13.1 11.5 - 17.0 %    Platelet 103 (L) 130 - 400 x10(3)/mcL    MPV 10.0 7.4 - 10.4 fL    Neut % 71.3 %    Lymph %  23.0 %    Mono % 2.9 %    Eos % 2.0 %    Basophil % 0.3 %    Lymph # 1.36 0.6 - 4.6 x10(3)/mcL    Neut # 4.21 2.1 - 9.2 x10(3)/mcL    Mono # 0.17 0.1 - 1.3 x10(3)/mcL    Eos # 0.12 0 - 0.9 x10(3)/mcL    Baso # 0.02 <=0.2 x10(3)/mcL    IG# 0.03 0 - 0.04 x10(3)/mcL    IG% 0.5 %    NRBC% 0.0 %    IPF 1.9 0.9 - 11.2 %   ISTAT PROCEDURE    Collection Time: 05/09/24  1:11 PM   Result Value Ref Range    POC PH 7.388 7.35 - 7.45    POC PCO2 39.1 35 - 45 mmHg    POC PO2 41 40 - 60 mmHg    POC HCO3 23.6 (L) 24 - 28 mmol/L    POC BE -1 -2 to 2 mmol/L    POC SATURATED O2 75 95 - 100 %    POC Glucose 106 70 - 110 mg/dL    POC Sodium 145 136 - 145 mmol/L    POC Potassium 4.7 3.5 - 5.1 mmol/L    POC TCO2 25 24 - 29 mmol/L    POC Ionized Calcium 1.09 1.06 - 1.42 mmol/L    POC Hematocrit 22 (L) 36 - 54 %PCV    POC HEMOGLOBIN 8 g/dL    Sample VENOUS     FiO2 60    ISTAT PROCEDURE    Collection Time: 05/09/24  1:29 PM   Result Value Ref Range    POC PH 7.333 (L) 7.35 - 7.45    POC PCO2 52.2 (H) 35 - 45 mmHg    POC PO2 381 (H) 80 - 100 mmHg    POC HCO3 27.7 24 - 28 mmol/L    POC BE 2 -2 to 2 mmol/L    POC SATURATED O2 100 95 - 100 %    POC Glucose 115 (H) 70 - 110 mg/dL    POC Sodium 145 136 - 145 mmol/L    POC Potassium 4.9 3.5 - 5.1 mmol/L    POC TCO2 29 (H) 23 - 27 mmol/L    POC Ionized Calcium 1.20 1.06 - 1.42 mmol/L    POC Hematocrit 22 (L) 36 - 54 %PCV    POC HEMOGLOBIN 8 g/dL    Sample ARTERIAL     FiO2 80    ISTAT PROCEDURE    Collection Time: 05/09/24  2:11 PM   Result Value Ref Range    POC PH 7.378 7.35 - 7.45    POC PCO2 38.3 35 - 45 mmHg    POC PO2 314 (H) 80 - 100 mmHg    POC HCO3 22.5 (L) 24 - 28 mmol/L    POC BE -3 (L) -2 to 2 mmol/L    POC SATURATED O2 100 95 - 100 %    POC Glucose 97 70 - 110 mg/dL    POC Sodium 143 136 - 145 mmol/L    POC Potassium 4.2 3.5 - 5.1 mmol/L    POC TCO2 24 23 - 27 mmol/L    POC Ionized Calcium 1.60 (H) 1.06 - 1.42 mmol/L    POC Hematocrit 26 (L) 36 - 54 %PCV    POC HEMOGLOBIN 9 g/dL     Sample ARTERIAL     FiO2 100    POCT glucose    Collection Time: 05/09/24  2:58 PM   Result Value Ref Range    POCT Glucose 124 (H) 70 - 110 mg/dL   POCT glucose    Collection Time: 05/09/24  3:45 PM   Result Value Ref Range    POCT Glucose 199 (H) 70 - 110 mg/dL   Blood Gas (ABG)    Collection Time: 05/09/24  3:49 PM   Result Value Ref Range    Sample Type Arterial Blood     Sample site Arterial Line     Drawn by RF RRT     pH, Blood gas 7.370 7.350 - 7.450    pCO2, Blood gas 35.0 35.0 - 45.0 mmHg    pO2, Blood gas 110.0 (H) 80.0 - 100.0 mmHg    Sodium, Blood Gas 138 137 - 145 mmol/L    Potassium, Blood Gas 3.4 (L) 3.5 - 5.0 mmol/L    Calcium Level Ionized 1.26 (H) 1.12 - 1.23 mmol/L    TOC2, Blood gas 21.3 mmol/L    Base Excess, Blood gas -4.50 (L) -2.00 - 2.00 mmol/L    sO2, Blood gas 98.2 %    HCO3, Blood gas 20.2 (L) 22.0 - 26.0 mmol/L    THb, Blood gas 9.5 (L) 12 - 16 g/dL    O2 Hb, Blood Gas 98.2 (H) 94.0 - 97.0 %    CO Hgb 2.5 (H) 0.5 - 1.5 %    Met Hgb 0.2 (L) 0.4 - 1.5 %    Allens Test N/A     MODE SIMV     FIO2, Blood gas 40.0 %    Mech Vt 500 ml    Mech RR 20 b/min    PEEP 5.0 cmH2O    PS 10.0 cmH2O   POCT glucose    Collection Time: 05/09/24  5:30 PM   Result Value Ref Range    POCT Glucose 157 (H) 70 - 110 mg/dL   Blood Gas (ABG)    Collection Time: 05/09/24  6:41 PM   Result Value Ref Range    Sample Type Arterial Blood     Sample site Arterial Line     Drawn by RF RRT     pH, Blood gas 7.320 (L) 7.350 - 7.450    pCO2, Blood gas 43.0 35.0 - 45.0 mmHg    pO2, Blood gas 111.0 (H) 80.0 - 100.0 mmHg    Sodium, Blood Gas 136 (L) 137 - 145 mmol/L    Potassium, Blood Gas 3.4 (L) 3.5 - 5.0 mmol/L    Calcium Level Ionized 1.12 1.12 - 1.23 mmol/L    TOC2, Blood gas 23.5 mmol/L    Base Excess, Blood gas -3.70 (L) -2.00 - 2.00 mmol/L    sO2, Blood gas 97.9 %    HCO3, Blood gas 22.2 22.0 - 26.0 mmol/L    THb, Blood gas 8.9 (L) 12 - 16 g/dL    O2 Hb, Blood Gas 96.9 94.0 - 97.0 %    CO Hgb 1.3 0.5 - 1.5 %    Met Hgb  0.8 0.4 - 1.5 %    Allens Test N/A     MODE CPAP     FIO2, Blood gas 30.0 %    CPAP 5 cm H2O    PS 10.0 cmH2O   Hemoglobin and Hematocrit    Collection Time: 05/09/24  6:58 PM   Result Value Ref Range    Hgb 8.6 (L) 12.0 - 16.0 g/dL    Hct 25.5 (L) 37.0 - 47.0 %   Potassium    Collection Time: 05/09/24  6:58 PM   Result Value Ref Range    Potassium 3.4 (L) 3.5 - 5.1 mmol/L   Magnesium    Collection Time: 05/09/24  6:58 PM   Result Value Ref Range    Magnesium Level 2.10 1.60 - 2.60 mg/dL   POCT glucose    Collection Time: 05/09/24  7:01 PM   Result Value Ref Range    POCT Glucose 221 (H) 70 - 110 mg/dL   MRSA PCR    Collection Time: 05/09/24  7:19 PM   Result Value Ref Range    MRSA PCR SCRN ([facility]) Not Detected Not Detected   POCT glucose    Collection Time: 05/09/24  8:21 PM   Result Value Ref Range    POCT Glucose 191 (H) 70 - 110 mg/dL   POCT glucose    Collection Time: 05/09/24  9:10 PM   Result Value Ref Range    POCT Glucose 180 (H) 70 - 110 mg/dL   POCT glucose    Collection Time: 05/09/24 10:05 PM   Result Value Ref Range    POCT Glucose 159 (H) 70 - 110 mg/dL   POCT glucose    Collection Time: 05/09/24 11:11 PM   Result Value Ref Range    POCT Glucose 138 (H) 70 - 110 mg/dL   POCT glucose    Collection Time: 05/10/24 12:09 AM   Result Value Ref Range    POCT Glucose 116 (H) 70 - 110 mg/dL   POCT glucose    Collection Time: 05/10/24  1:06 AM   Result Value Ref Range    POCT Glucose 108 70 - 110 mg/dL   POCT glucose    Collection Time: 05/10/24  2:05 AM   Result Value Ref Range    POCT Glucose 106 70 - 110 mg/dL   POCT glucose    Collection Time: 05/10/24  3:04 AM   Result Value Ref Range    POCT Glucose 113 (H) 70 - 110 mg/dL   CBC Without Differential    Collection Time: 05/10/24  3:08 AM   Result Value Ref Range    WBC 7.85 4.50 - 11.50 x10(3)/mcL    RBC 3.59 (L) 4.20 - 5.40 x10(6)/mcL    Hgb 10.4 (L) 12.0 - 16.0 g/dL    Hct 31.6 (L) 37.0 - 47.0 %    MCV 88.0 80.0 - 94.0 fL    MCH 29.0 27.0 - 31.0 pg     MCHC 32.9 (L) 33.0 - 36.0 g/dL    RDW 14.4 11.5 - 17.0 %    Platelet 98 (L) 130 - 400 x10(3)/mcL    MPV 10.8 (H) 7.4 - 10.4 fL    IPF 2.6 0.9 - 11.2 %    NRBC% 0.0 %   Comprehensive Metabolic Panel    Collection Time: 05/10/24  3:08 AM   Result Value Ref Range    Sodium 136 136 - 145 mmol/L    Potassium 4.0 3.5 - 5.1 mmol/L    Chloride 111 (H) 98 - 107 mmol/L    CO2 21 (L) 23 - 31 mmol/L    Glucose 111 82 - 115 mg/dL    Blood Urea Nitrogen 7.5 (L) 9.8 - 20.1 mg/dL    Creatinine 0.67 0.55 - 1.02 mg/dL    Calcium 8.0 (L) 8.4 - 10.2 mg/dL    Protein Total 4.7 (L) 5.8 - 7.6 gm/dL    Albumin 3.4 3.4 - 4.8 g/dL    Globulin 1.3 (L) 2.4 - 3.5 gm/dL    Albumin/Globulin Ratio 2.6 (H) 1.1 - 2.0 ratio    Bilirubin Total 1.0 <=1.5 mg/dL    ALP 36 (L) 40 - 150 unit/L    ALT 11 0 - 55 unit/L    AST 34 5 - 34 unit/L    eGFR >60 mL/min/1.73/m2   POCT glucose    Collection Time: 05/10/24  4:08 AM   Result Value Ref Range    POCT Glucose 111 (H) 70 - 110 mg/dL   POCT glucose    Collection Time: 05/10/24  5:04 AM   Result Value Ref Range    POCT Glucose 107 70 - 110 mg/dL   POCT glucose    Collection Time: 05/10/24  5:59 AM   Result Value Ref Range    POCT Glucose 138 (H) 70 - 110 mg/dL   POCT glucose    Collection Time: 05/10/24  7:46 AM   Result Value Ref Range    POCT Glucose 115 (H) 70 - 110 mg/dL     Telemetry: SR    Physical Exam  Vitals reviewed.   Constitutional:       General: She is not in acute distress.     Appearance: Normal appearance. She is obese.   HENT:      Head: Normocephalic.      Mouth/Throat:      Mouth: Mucous membranes are moist.   Eyes:      Conjunctiva/sclera: Conjunctivae normal.   Cardiovascular:      Rate and Rhythm: Normal rate and regular rhythm.      Pulses: Normal pulses.      Heart sounds: Normal heart sounds. No murmur heard.  Pulmonary:      Effort: Pulmonary effort is normal. No respiratory distress.      Breath sounds: Decreased breath sounds present.      Comments: NC O2  Abdominal:       Palpations: Abdomen is soft.   Genitourinary:     Comments: Serr[place]   Skin:     General: Skin is warm.      Comments: R Wrist Soft/Flat, Non-Tender, No Sign of Bleed/Infection. +2 BLE Palpable Radial Pulses. Midline Sternotomy Dressing C/D/I. + CTs    Neurological:      Mental Status: She is alert and oriented to person, place, and time. Mental status is at baseline.   Psychiatric:         Mood and Affect: Mood normal.         Behavior: Behavior normal.         Judgment: Judgment normal.       Current Inpatient Medications:  Current Facility-Administered Medications:     0.9%  NaCl infusion (for blood administration), , Intravenous, Q24H PRN, Lissy London, TYLER    acetaminophen 1,000 mg/100 mL (10 mg/mL) injection 1,000 mg, 1,000 mg, Intravenous, Q8H, Nehemias Talamantes MD, Stopped at 05/10/24 0607    acetaminophen tablet 650 mg, 650 mg, Oral, Q4H PRN, Celso Watts Jr., MD, 650 mg at 05/07/24 1230    albumin human 5% bottle 12.5 g, 12.5 g, Intravenous, PRN, Anna Mitchell PA, Stopped at 05/10/24 0157    aspirin EC tablet 325 mg, 325 mg, Oral, Once, Aubrey Davis III, MD    aspirin EC tablet 81 mg, 81 mg, Oral, Daily, Anna Mitchell PA, 81 mg at 05/10/24 0821    atorvastatin tablet 80 mg, 80 mg, Oral, QHS, Danuta Cheek NP, 80 mg at 05/09/24 2206    calcium gluconate 1 g in NS IVPB (premixed), 1 g, Intravenous, PRN, Anna Mitchell PA    calcium gluconate 1 g in NS IVPB (premixed), 2 g, Intravenous, PRN, Anna Mitchell PA    calcium gluconate 1 g in NS IVPB (premixed), 3 g, Intravenous, PRN, Anna Mitchell PA    cefazolin (ANCEF) 2 gram in dextrose 5% 50 mL IVPB (premix), 2 g, Intravenous, Q12H, Anna Mitchell PA, Stopped at 05/09/24 2338    cefUROXime sodium 1.5 g in dextrose 5 % in water (D5W) 100 mL IVPB (MB+), 1.5 g, Intravenous, On Call Procedure, Quentin Peterson MD, 1.5 g at 05/09/24 1142    dexmedetomidine (PRECEDEX) 400mcg/100mL 0.9% NaCL infusion, 0-1.4 mcg/kg/hr, Intravenous,  ContinuousStephen Jennifer M, PA, Last Rate: 11.805 mL/hr at 05/09/24 1325, 0.6 mcg/kg/hr at 05/09/24 1325    dextrose 10% bolus 125 mL 125 mL, 12.5 g, Intravenous, PRN, Anna Mitchell PA    dextrose 10% bolus 250 mL 250 mL, 25 g, Intravenous, PRN, Anna Mitchell PA    dextrose 5 % and 0.45 % NaCl infusion, , Intravenous, Continuous, Anna Mitchell PA, Paused at 05/10/24 0552    docusate sodium capsule 100 mg, 100 mg, Oral, BID, Anna Mitchell PA, 100 mg at 05/10/24 0821    EPINEPHrine (ADRENALIN) 5 mg in dextrose 5 % (D5W) 250 mL infusion, 0-2 mcg/kg/min (Dosing Weight), Intravenous, Continuous, Anna Mitchell PA, Stopped at 05/09/24 2200    famotidine (PF) injection 20 mg, 20 mg, Intravenous, Daily, Anna Mitchell PA, 20 mg at 05/10/24 0822    folic acid tablet 1 mg, 1 mg, Oral, Daily, Anna Mitchell PA, 1 mg at 05/10/24 0821    hydrALAZINE injection 10 mg, 10 mg, Intravenous, Q4H PRN, Celso Watts Jr., MD    HYDROcodone-acetaminophen 5-325 mg per tablet 1 tablet, 1 tablet, Oral, Q4H PRN, Celso Watts Jr., MD, 1 tablet at 05/10/24 0821    HYDROcodone-acetaminophen 5-325 mg per tablet 1 tablet, 1 tablet, Oral, Q4H PRN, Anna Mitchell PA    insulin regular in 0.9 % NaCl 100 unit/100 mL (1 unit/mL) infusion, 0-52 Units/hr, Intravenous, Continuous, Anna Mitchell PA, Last Rate: 2 mL/hr at 05/10/24 0616, 2 Units/hr at 05/10/24 0616    lactulose 10 gram/15 ml solution 20 g, 20 g, Oral, Q6H PRN, Anna Mitchell PA    loperamide capsule 2 mg, 2 mg, Oral, Continuous PRN, Anna Mitchell PA    magnesium sulfate 2g in water 50mL IVPB (premix), 2 g, Intravenous, PRN, Anna Mitchell PA    magnesium sulfate 2g in water 50mL IVPB (premix), 4 g, Intravenous, PRN, Anna Mitchell PA    metoclopramide injection 5 mg, 5 mg, Intravenous, Q6H PRN, Anna Mitchell PA    metoprolol tartrate (LOPRESSOR) split tablet 12.5 mg, 12.5 mg, Oral, BID, Anna Mitchell PA     morphine injection 2 mg, 2 mg, Intravenous, Q4H PRN, Celso Watts Jr., MD    morphine injection 4 mg, 4 mg, Intravenous, Q4H PRN, Anna Mitchell PA, 4 mg at 05/09/24 1834    mupirocin 2 % ointment, , Nasal, On Call Procedure, Nehemias Talamantes MD    mupirocin 2 % ointment, , Nasal, BID, Anna Mitchell PA, Given at 05/10/24 0822    nitroGLYCERIN SL tablet 0.4 mg, 0.4 mg, Sublingual, Q5 Min PRN, Aubrey Davis III, MD    ondansetron injection 4 mg, 4 mg, Intravenous, Q8H PRN, Celso Watts Jr., MD    ondansetron injection 4 mg, 4 mg, Intravenous, Q4H PRN, Anna Mitchell PA, 4 mg at 05/10/24 0823    oxyCODONE immediate release tablet 5 mg, 5 mg, Oral, Q4H PRN, Nehemias Talamantes MD, 5 mg at 05/10/24 0409    oxyCODONE immediate release tablet Tab 10 mg, 10 mg, Oral, Q4H PRN, Anna Mitchell PA    potassium chloride 20 mEq in 100 mL IVPB (FOR CENTRAL LINE ADMINISTRATION ONLY), 20 mEq, Intravenous, PRN, Anna Mitchell PA    potassium chloride 20 mEq in 100 mL IVPB (FOR CENTRAL LINE ADMINISTRATION ONLY), 40 mEq, Intravenous, PRN, Anna Mitchell PA, Stopped at 05/10/24 0013    potassium chloride 20 mEq in 100 mL IVPB (FOR CENTRAL LINE ADMINISTRATION ONLY), 60 mEq, Intravenous, PRN, Anna Mitchell PA    sodium chloride 0.9% flush 10 mL, 10 mL, Intravenous, PRN, Aubrey Davis III, MD    sodium chloride 0.9% flush 10 mL, 10 mL, Intravenous, PRN, Danuta Cheek NP    sucralfate tablet 1 g, 1 g, Oral, QID (AC & HS), Anna Mitchell PA, 1 g at 05/10/24 0553  VTE Risk Mitigation (From admission, onward)           Ordered     IP VTE HIGH RISK PATIENT  Once         05/10/24 0005     Place MARISELA hose  Until discontinued         05/09/24 1455     Place sequential compression device  Until discontinued         05/05/24 1244                  Assessment:   MVCAD    - 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 to LAD, SV to RCA Phu; LAAL    - LHC (5.6.24): Severe CAD; Mid LAD 60-70% Stenosis IFR 0.96, Ostial  Left Circumflex 95%, Prox RCA 90%    - PET (5.2.24): Concern for Anterior ischemia  Acute Respiratory Failure Requiring Intubation/Ventilation - Now on NC   Hypotension requiring Pressors - Resolved     - History of HTN  Unstable Angina - Resolved   HLD  GERD  Obesity   No Hx of GIB    Plan:   Continue ASA, BBand Statin   No BB Secondary to baseline Bradycardia  Aggressive Mobilization of PT and Q1HR IS  Labs and EKG in AM: CBC, CMP and Mg     YELENA Salinas  Cardiology  [facility]  05/10/2024    I agree with the findings of the complexity of problems addressed and take responsibility for the plan's risks and complications. I approved the plan documented by Brandon Rodriguez NP.  Beta-blockers being held for bradycardia continue mobilization

## 2024-05-10 NOTE — ANESTHESIA POSTPROCEDURE EVALUATION
Anesthesia Post Evaluation    Patient: Tanvi Miranda    Procedure(s) Performed: Procedure(s) (LRB):  CORONARY ARTERY BYPASS GRAFT (CABG) (N/A)  SURGICAL PROCUREMENT, VEIN, ENDOSCOPIC (N/A)  Left atrial appendage closure device (N/A)    Final Anesthesia Type: general      Patient location during evaluation: ICU  Patient participation: No - Unable to Participate, Intubation  Level of consciousness: sedated  Post-procedure vital signs: reviewed and stable  Pain management: adequate  Airway patency: patent  REAGAN mitigation strategies: Extubation while patient is awake  PONV status at discharge: No PONV  Anesthetic complications: no      Cardiovascular status: blood pressure returned to baseline  Respiratory status: ETT and ventilator  Hydration status: euvolemic  Follow-up not needed.              Vitals Value Taken Time   /61 05/10/24 0552   Temp 37.2 °C (99 °F) 05/10/24 0400   Pulse 95 05/10/24 0552   Resp 28 05/10/24 0552   SpO2 94 % 05/10/24 0552   Vitals shown include unfiled device data.      No case tracking events are documented in the log.      Pain/Sommer Score: Pain Rating Prior to Med Admin: 3 (5/10/2024  5:51 AM)  Pain Rating Post Med Admin: 4 (5/10/2024  5:09 AM)

## 2024-05-10 NOTE — NURSING
Nurses Note -- 4 Eyes      5/10/2024   5:45 AM      Skin assessed during: Daily Assessment      [x] No Altered Skin Integrity Present    [x]Prevention Measures Documented      [] Yes- Altered Skin Integrity Present or Discovered   [] LDA Added if Not in Epic (Describe Wound)   [] New Altered Skin Integrity was Present on Admit and Documented in LDA   [] Wound Image Taken    Wound Care Consulted? No    Attending Nurse:  Krysta Vital RN/Staff Member:   JANIYA Green

## 2024-05-11 LAB
ALBUMIN SERPL-MCNC: 3.1 G/DL (ref 3.4–4.8)
ALBUMIN/GLOB SERPL: 1.2 RATIO (ref 1.1–2)
ALP SERPL-CCNC: 55 UNIT/L (ref 40–150)
ALT SERPL-CCNC: 13 UNIT/L (ref 0–55)
AST SERPL-CCNC: 32 UNIT/L (ref 5–34)
BASOPHILS # BLD AUTO: 0.02 X10(3)/MCL
BASOPHILS NFR BLD AUTO: 0.2 %
BILIRUB SERPL-MCNC: 1.2 MG/DL
BUN SERPL-MCNC: 7.8 MG/DL (ref 9.8–20.1)
CALCIUM SERPL-MCNC: 9.3 MG/DL (ref 8.4–10.2)
CHLORIDE SERPL-SCNC: 108 MMOL/L (ref 98–107)
CO2 SERPL-SCNC: 23 MMOL/L (ref 23–31)
CREAT SERPL-MCNC: 0.72 MG/DL (ref 0.55–1.02)
EOSINOPHIL # BLD AUTO: 0.04 X10(3)/MCL (ref 0–0.9)
EOSINOPHIL NFR BLD AUTO: 0.4 %
ERYTHROCYTE [DISTWIDTH] IN BLOOD BY AUTOMATED COUNT: 14.4 % (ref 11.5–17)
GFR SERPLBLD CREATININE-BSD FMLA CKD-EPI: >60 ML/MIN/1.73/M2
GLOBULIN SER-MCNC: 2.5 GM/DL (ref 2.4–3.5)
GLUCOSE SERPL-MCNC: 108 MG/DL (ref 82–115)
HCT VFR BLD AUTO: 35.5 % (ref 37–47)
HGB BLD-MCNC: 12 G/DL (ref 12–16)
IMM GRANULOCYTES # BLD AUTO: 0.04 X10(3)/MCL (ref 0–0.04)
IMM GRANULOCYTES NFR BLD AUTO: 0.4 %
LYMPHOCYTES # BLD AUTO: 1.14 X10(3)/MCL (ref 0.6–4.6)
LYMPHOCYTES NFR BLD AUTO: 11.3 %
MAGNESIUM SERPL-MCNC: 1.9 MG/DL (ref 1.6–2.6)
MCH RBC QN AUTO: 29.2 PG (ref 27–31)
MCHC RBC AUTO-ENTMCNC: 33.8 G/DL (ref 33–36)
MCV RBC AUTO: 86.4 FL (ref 80–94)
MONOCYTES # BLD AUTO: 0.76 X10(3)/MCL (ref 0.1–1.3)
MONOCYTES NFR BLD AUTO: 7.5 %
NEUTROPHILS # BLD AUTO: 8.09 X10(3)/MCL (ref 2.1–9.2)
NEUTROPHILS NFR BLD AUTO: 80.2 %
NRBC BLD AUTO-RTO: 0 %
OHS QRS DURATION: 70 MS
OHS QTC CALCULATION: 400 MS
PLATELET # BLD AUTO: 98 X10(3)/MCL (ref 130–400)
PMV BLD AUTO: 11.5 FL (ref 7.4–10.4)
POCT GLUCOSE: 101 MG/DL (ref 70–110)
POCT GLUCOSE: 128 MG/DL (ref 70–110)
POCT GLUCOSE: 94 MG/DL (ref 70–110)
POTASSIUM SERPL-SCNC: 4.8 MMOL/L (ref 3.5–5.1)
PROT SERPL-MCNC: 5.6 GM/DL (ref 5.8–7.6)
RBC # BLD AUTO: 4.11 X10(6)/MCL (ref 4.2–5.4)
SODIUM SERPL-SCNC: 139 MMOL/L (ref 136–145)
WBC # SPEC AUTO: 10.09 X10(3)/MCL (ref 4.5–11.5)

## 2024-05-11 PROCEDURE — 63600175 PHARM REV CODE 636 W HCPCS: Performed by: PHYSICIAN ASSISTANT

## 2024-05-11 PROCEDURE — 93010 ELECTROCARDIOGRAM REPORT: CPT | Mod: ,,, | Performed by: INTERNAL MEDICINE

## 2024-05-11 PROCEDURE — 21400001 HC TELEMETRY ROOM

## 2024-05-11 PROCEDURE — 94799 UNLISTED PULMONARY SVC/PX: CPT

## 2024-05-11 PROCEDURE — 63600175 PHARM REV CODE 636 W HCPCS

## 2024-05-11 PROCEDURE — 99024 POSTOP FOLLOW-UP VISIT: CPT | Mod: ,,, | Performed by: PHYSICIAN ASSISTANT

## 2024-05-11 PROCEDURE — 25000003 PHARM REV CODE 250: Performed by: THORACIC SURGERY (CARDIOTHORACIC VASCULAR SURGERY)

## 2024-05-11 PROCEDURE — 93005 ELECTROCARDIOGRAM TRACING: CPT

## 2024-05-11 PROCEDURE — 80053 COMPREHEN METABOLIC PANEL: CPT | Performed by: PHYSICIAN ASSISTANT

## 2024-05-11 PROCEDURE — 94760 N-INVAS EAR/PLS OXIMETRY 1: CPT

## 2024-05-11 PROCEDURE — 36415 COLL VENOUS BLD VENIPUNCTURE: CPT | Performed by: PHYSICIAN ASSISTANT

## 2024-05-11 PROCEDURE — 97110 THERAPEUTIC EXERCISES: CPT

## 2024-05-11 PROCEDURE — 85025 COMPLETE CBC W/AUTO DIFF WBC: CPT | Performed by: NURSE PRACTITIONER

## 2024-05-11 PROCEDURE — 25000003 PHARM REV CODE 250: Performed by: PHYSICIAN ASSISTANT

## 2024-05-11 PROCEDURE — 25000003 PHARM REV CODE 250: Performed by: NURSE PRACTITIONER

## 2024-05-11 PROCEDURE — 99900031 HC PATIENT EDUCATION (STAT)

## 2024-05-11 PROCEDURE — 83735 ASSAY OF MAGNESIUM: CPT | Performed by: NURSE PRACTITIONER

## 2024-05-11 PROCEDURE — 99900035 HC TECH TIME PER 15 MIN (STAT)

## 2024-05-11 RX ORDER — FAMOTIDINE 20 MG/1
20 TABLET, FILM COATED ORAL DAILY
Status: DISCONTINUED | OUTPATIENT
Start: 2024-05-11 | End: 2024-05-14 | Stop reason: HOSPADM

## 2024-05-11 RX ORDER — KETOROLAC TROMETHAMINE 30 MG/ML
30 INJECTION, SOLUTION INTRAMUSCULAR; INTRAVENOUS EVERY 6 HOURS
Status: COMPLETED | OUTPATIENT
Start: 2024-05-11 | End: 2024-05-12

## 2024-05-11 RX ADMIN — SUCRALFATE 1 G: 1 TABLET ORAL at 01:05

## 2024-05-11 RX ADMIN — ATORVASTATIN CALCIUM 80 MG: 40 TABLET, FILM COATED ORAL at 08:05

## 2024-05-11 RX ADMIN — KETOROLAC TROMETHAMINE 30 MG: 30 INJECTION, SOLUTION INTRAMUSCULAR at 01:05

## 2024-05-11 RX ADMIN — METOPROLOL TARTRATE 12.5 MG: 25 TABLET, FILM COATED ORAL at 09:05

## 2024-05-11 RX ADMIN — SUCRALFATE 1 G: 1 TABLET ORAL at 05:05

## 2024-05-11 RX ADMIN — MUPIROCIN: 20 OINTMENT TOPICAL at 09:05

## 2024-05-11 RX ADMIN — SUCRALFATE 1 G: 1 TABLET ORAL at 06:05

## 2024-05-11 RX ADMIN — FOLIC ACID 1 MG: 1 TABLET ORAL at 09:05

## 2024-05-11 RX ADMIN — FAMOTIDINE 20 MG: 20 TABLET, FILM COATED ORAL at 09:05

## 2024-05-11 RX ADMIN — SUCRALFATE 1 G: 1 TABLET ORAL at 08:05

## 2024-05-11 RX ADMIN — DOCUSATE SODIUM 100 MG: 100 CAPSULE, LIQUID FILLED ORAL at 08:05

## 2024-05-11 RX ADMIN — DOCUSATE SODIUM 100 MG: 100 CAPSULE, LIQUID FILLED ORAL at 09:05

## 2024-05-11 RX ADMIN — METOPROLOL TARTRATE 12.5 MG: 25 TABLET, FILM COATED ORAL at 08:05

## 2024-05-11 RX ADMIN — ENOXAPARIN SODIUM 40 MG: 40 INJECTION SUBCUTANEOUS at 05:05

## 2024-05-11 RX ADMIN — ASPIRIN 81 MG: 81 TABLET, COATED ORAL at 09:05

## 2024-05-11 RX ADMIN — OXYCODONE HYDROCHLORIDE 5 MG: 5 TABLET ORAL at 06:05

## 2024-05-11 RX ADMIN — KETOROLAC TROMETHAMINE 30 MG: 30 INJECTION, SOLUTION INTRAMUSCULAR at 06:05

## 2024-05-11 RX ADMIN — MUPIROCIN: 20 OINTMENT TOPICAL at 08:05

## 2024-05-11 NOTE — PROGRESS NOTES
05/11/24 0910   Pre Exercise Vitals   /66   Pulse 110   Supplemental O2? No   SpO2 95 %   During Exercise Vitals   Pulse 118   Supplemental O2? No   SpO2   (difficult to obtain)   Distance Walked 50 feet   Post Exercise Vitals   /82   Pulse 105   Supplemental O2? No   SpO2 96 %   Modality   Modality   (rollator)     Communicated with nurse pre and post walk. Performed incentive spirometer. Max 750. Mod assist x1 from sitting to standing x2 attempts. C/o right knee discomfort. Ambulated 50 feet with rollator. Sternal precautions maintained and reinforced. Gait steady, slow. Assisted back to chair. Encouraged incentive spirometer q 1 hour and increase activity.

## 2024-05-11 NOTE — PROGRESS NOTES
CT SURGERY PROGRESS NOTE  Tanvi Miranda  68 y.o.  1955    Patients Procedure: Procedure(s) (LRB):  CORONARY ARTERY BYPASS GRAFT (CABG) (N/A)  SURGICAL PROCUREMENT, VEIN, ENDOSCOPIC (N/A)  Left atrial appendage closure device (N/A)    Subjective  Interval History: Patient is postoperative day 2 from CABG x 2 with EVH and ligation of left atrial appendage.  Transfused 2 units of PRBC Thursday evening with appropriate response.      Review of Systems   Constitutional:  Negative for chills, fever and malaise/fatigue.   Respiratory:  Negative for cough, shortness of breath and wheezing.    Cardiovascular:  Positive for chest pain (incisional). Negative for palpitations and leg swelling.   Gastrointestinal:  Positive for nausea. Negative for vomiting.       Medication List  Infusions   dexmedeTOMIDine (Precedex) infusion (titrating)  0-1.4 mcg/kg/hr Intravenous Continuous 11.805 mL/hr at 05/09/24 1325 0.6 mcg/kg/hr at 05/09/24 1325    dextrose 5 % and 0.45 % NaCl   Intravenous Continuous   Stopped at 05/10/24 1217    EPINEPHrine  0-2 mcg/kg/min (Dosing Weight) Intravenous Continuous   Stopped at 05/09/24 2200    insulin regular 1 units/mL infusion orderable (CTS POST-OP)  0-52 Units/hr Intravenous Continuous   Stopped at 05/10/24 1643    loperamide  2 mg Oral Continuous PRN         Scheduled   aspirin  81 mg Oral Daily    atorvastatin  80 mg Oral QHS    docusate sodium  100 mg Oral BID    enoxparin  40 mg Subcutaneous Daily    famotidine (PF)  20 mg Intravenous Daily    folic acid  1 mg Oral Daily    metoprolol tartrate  12.5 mg Oral BID    mupirocin   Nasal BID    sucralfate  1 g Oral QID (AC & HS)       Objective:  Recent Vitals:  Temp:  [98.6 °F (37 °C)-99.5 °F (37.5 °C)] 99.1 °F (37.3 °C)  Pulse:  [] 102  Resp:  [14-30] 26  SpO2:  [73 %-98 %] 94 %  BP: ()/(58-92) 118/71    Physical Exam  Constitutional:       General: She is not in acute distress.     Appearance: She is not ill-appearing.   HENT:       Head: Normocephalic and atraumatic.      Mouth/Throat:      Mouth: Mucous membranes are moist.      Pharynx: Oropharynx is clear.   Cardiovascular:      Rate and Rhythm: Normal rate and regular rhythm.      Pulses: Normal pulses.      Heart sounds: Normal heart sounds. No murmur heard.     No friction rub. No gallop.   Pulmonary:      Effort: Pulmonary effort is normal. No respiratory distress.      Breath sounds: Normal breath sounds. No wheezing, rhonchi or rales.   Abdominal:      General: There is no distension.      Palpations: Abdomen is soft.   Musculoskeletal:         General: Normal range of motion.      Cervical back: Normal range of motion and neck supple.      Right lower leg: No edema.      Left lower leg: No edema.   Skin:     General: Skin is warm and dry.      Comments: Median sternotomy incision dressed - c/d/I  Ct x 2 in place.  Dressed. C/d/i   Neurological:      General: No focal deficit present.      Mental Status: She is alert and oriented to person, place, and time.   Psychiatric:         Mood and Affect: Mood normal.         Behavior: Behavior normal.          I/O last 24 hrs:  Intake/Output - Last 3 Shifts         05/09 0700  05/10 0659 05/10 0700  05/11 0659 05/11 0700 05/12 0659    P.O. 120 240     I.V. (mL/kg) 1812.9 (21.5) 571.6 (6.9)     Blood 522.9      IV Piggyback 2010.4 50     Total Intake(mL/kg) 4466.2 (52.9) 861.6 (10.3)     Urine (mL/kg/hr) 2575 (1.3) 1450 (0.7)     Stool 0      Chest Tube 490 490     Total Output 3065 1940     Net +1401.2 -1078.4            Urine Occurrence 1 x      Stool Occurrence 1 x              Labs  CBC:   Recent Labs   Lab 05/11/24  0600   WBC 10.09   RBC 4.11*   HGB 12.0   HCT 35.5*   PLT 98*   MCV 86.4   MCH 29.2   MCHC 33.8     CMP:   Recent Labs   Lab 05/11/24  0600   CALCIUM 9.3   ALBUMIN 3.1*      K 4.8   CO2 23   BUN 7.8*   CREATININE 0.72   ALKPHOS 55   ALT 13   AST 32   BILITOT 1.2     LFTs:   Recent Labs   Lab 05/11/24  0600   ALT 13    AST 32   ALKPHOS 55   BILITOT 1.2   ALBUMIN 3.1*     All pertinent labs from the last 24 hours have been reviewed.      Imaging:   CXR: X-Ray Chest AP Portable    Result Date: 5/10/2024  1. Interval removal of endotracheal and enteric tubes. 2. Bibasilar subsegmental atelectasis. Electronically signed by: Neda Cuevas Date:    05/10/2024 Time:    05:56    X-Ray Chest AP Portable    Result Date: 5/9/2024  Postoperative changes Electronically signed by: Garth Dixon Date:    05/09/2024 Time:    15:35   I have reviewed all pertinent imaging results/findings within the past 24 hours.        ASSESSMENT/PLAN:    Multivessel coronary artery disease  HTN  HLD  Significant family history of early CAD    -s/p CABG x 2 with EVH and LLAA  DC Med CTG - cont L CT   -CXR with bibasilar subsegmental atelectasis.  -OOB.  Mobilize.  Aggressive IS use  -OK to downgrade to telemetry - waiting on bed    Case and plan of care discussed with MD Celso Carlson PAC

## 2024-05-11 NOTE — PROGRESS NOTES
"[facility]    Cardiology  Progress Note    Patient Name: Tanvi Miranda  MRN: [id number]  Admission Date: 5/5/2024  Hospital Length of Stay: 6 days  Code Status: Full Code   Attending Physician: Nehemias Talamantes MD   Primary Care Physician: Rupinder, Primary Doctor  Expected Discharge Date:   Principal Problem:<principal problem not specified>    Subjective:     Brief HPI/Hospital Course: 68-year-old female that is known to Dr. Doe with a PMHx of HLD, HTN, GERD and family Hx of early CAD. She presented to [facility] ED on 5.5.24 with complaints of left sided chest pain. Patient recently had a VAN/PET and ECHO completed by Dr. Doe and was planned to follow up 5.9.24 for results. She reports associated SOB and fatugue with exertion. She also report radiating left arm pain. Her last [facility] was back in 2018. ED Course: HR 66, /84, RR 20, Temp 98.9F SpO2 100% on RA. Lab work showed: WBC 3.98, K 4.1, BUN/Cr 10.3/0.78, Ca 10.1, AST/ALT 28/23, BNP 12.7, Trop: <0.010 X2, EKG shows no acute ST changes. [facility] will admit for chest pain.      5.7.24:  NAD. VSS. No complaints of CP/SOB/Palps. "I feel okay"  5.8.24: NAD. VSS. No complaints of CP/SOB/Palps. "I feel okay" CV surgery planning CABG for tomorrow. Resting comfortably in bed.  5.9.24: NAD. Intubated/Sedated. VSS. On Epi and Precedex   5.10.24: NAD. Sitting in Bedside Chair. Denies SOB and Palps. + CP/Incisional. Insulin Drip per CVS. "I am ok, just some pain."   5.11.24: NAD. "I am feeling well." Denies SOB and Palps. + CP/Incisional.     PMH: HLD, HTN, GERD and family Hx of early CAD  PSH: Colonoscopy, tonsillectomy, Hysterectomy   Family History: Mother: CAD s/p PCI, Alzheimers, PAD, Brother & Sister: CAD  Social History: Denies Tobacco, illicit drug, or ETOH use.      Previous Cardiac Diagnostics:   [facility] (5.6.24):  There is significant coronary artery disease. Mid Left Anterior Descending has a 60-70% stenosis. Instantaneous wave-free ratio (iFR) was performed " on the lesion, and found to be non-hemodynamically significant at 0.96. 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 Ostial Left Circumflex has a 95% stenosis. The distal vessel is supplied via left-to-right collaterals from the LAD (septal perforating branches). Prox Right Coronary Artery has a calcified 90% stenosis. The vessel is moderately tortuous. The distal vessel is supplied via left-to-right collaterals from the LAD (septal perforating branches). LVEDP is normal at 13 mmHg.    [facility] PET (5.2.24):  The left ventricular cavity is noted to be normal on the stress studies. The stress left ventricular ejection fraction was calculated to be 63% and left ventricular global function is normal. The rest left ventricular cavity is noted to be normal. The rest left ventricular ejection fraction was calculated to be 50% and rest left ventricular global function is normal.   When compared to the resting ejection fraction (50%), the stress ejection fraction (63%) has increased.   There was a rise in myocardial blood flow between rest and stress.  Global myocardial blood flow reserve was 2.88.  Myocardial blood flow reserve is reduced in the inferior territory which is suggestive of flow limiting stenosis in this territory.  *FINAL READ NOT DONE*     ECHO (5.2.24):  The study quality is average.   The left ventricle is normal in size. Global left ventricular systolic function is normal. The left ventricular ejection fraction is 60%.   Normal appearing valvular structures and function are within study limits.   The pulmonary artery systolic pressure is 10 mmHg. The pulmonary artery appears to be normal.     LHC ( 8.3.18):  LAD: arrises right from the aorta there is no LM. Significant calcification present in LAD. There is a 40-50% stenosis in the midpoint of the LAD. D2 is moderate in size with 20-30% stenosis   RCA: large prominent vessel. It is dominant. It shows disease of approximately 60-70% in the mid  segment, vessel is extremely tortuous and calcified,  LCx: arises from proximal segment of the RCA with a anomalous origin, Shows a long tubular  50% stenosis proximally with heavy calcification,  No obstructive disease was found   PLAN: Medical management     Review of Systems   Constitutional: Negative for malaise/fatigue.   Cardiovascular:  Positive for chest pain (Incisional). Negative for leg swelling and palpitations.   Respiratory:  Negative for shortness of breath.    All other systems reviewed and are negative.    Objective:     Vital Signs (Most Recent):  Temp: 98.2 °F (36.8 °C) (05/11/24 0800)  Pulse: 105 (05/11/24 1400)  Resp: (!) 24 (05/11/24 1400)  BP: 110/65 (05/11/24 1400)  SpO2: (!) 94 % (05/11/24 1400) Vital Signs (24h Range):  Temp:  [98.2 °F (36.8 °C)-99.5 °F (37.5 °C)] 98.2 °F (36.8 °C)  Pulse:  [] 105  Resp:  [14-30] 24  SpO2:  [73 %-95 %] 94 %  BP: (104-133)/(61-92) 110/65   Weight: 83.3 kg (183 lb 10.3 oz)  Body mass index is 32.53 kg/m².  SpO2: (!) 94 %       Intake/Output Summary (Last 24 hours) at 5/11/2024 1627  Last data filed at 5/11/2024 0604  Gross per 24 hour   Intake 861.56 ml   Output 1940 ml   Net -1078.44 ml     Lines/Drains/Airways       Drain  Duration                  Chest Tube 05/09/24 Left 28 Fr. 2 days              Peripheral Intravenous Line  Duration                  Peripheral IV - Single Lumen 05/05/24 1201 20 G Anterior;Proximal;Right Forearm 6 days                  Significant Labs:   Recent Results (from the past 72 hour(s))   Magnesium    Collection Time: 05/09/24  4:03 AM   Result Value Ref Range    Magnesium Level 2.10 1.60 - 2.60 mg/dL   Comprehensive Metabolic Panel    Collection Time: 05/09/24  4:03 AM   Result Value Ref Range    Sodium 141 136 - 145 mmol/L    Potassium 4.0 3.5 - 5.1 mmol/L    Chloride 111 (H) 98 - 107 mmol/L    CO2 23 23 - 31 mmol/L    Glucose 106 82 - 115 mg/dL    Blood Urea Nitrogen 13.7 9.8 - 20.1 mg/dL    Creatinine 0.77 0.55 - 1.02  mg/dL    Calcium 9.8 8.4 - 10.2 mg/dL    Protein Total 6.3 5.8 - 7.6 gm/dL    Albumin 3.6 3.4 - 4.8 g/dL    Globulin 2.7 2.4 - 3.5 gm/dL    Albumin/Globulin Ratio 1.3 1.1 - 2.0 ratio    Bilirubin Total 0.7 <=1.5 mg/dL    ALP 81 40 - 150 unit/L    ALT 13 0 - 55 unit/L    AST 18 5 - 34 unit/L    eGFR >60 mL/min/1.73/m2   CBC with Differential    Collection Time: 05/09/24  4:03 AM   Result Value Ref Range    WBC 5.57 4.50 - 11.50 x10(3)/mcL    RBC 4.52 4.20 - 5.40 x10(6)/mcL    Hgb 13.5 12.0 - 16.0 g/dL    Hct 40.5 37.0 - 47.0 %    MCV 89.6 80.0 - 94.0 fL    MCH 29.9 27.0 - 31.0 pg    MCHC 33.3 33.0 - 36.0 g/dL    RDW 12.8 11.5 - 17.0 %    Platelet 166 130 - 400 x10(3)/mcL    MPV 9.9 7.4 - 10.4 fL    Neut % 58.3 %    Lymph % 27.8 %    Mono % 9.9 %    Eos % 3.1 %    Basophil % 0.7 %    Lymph # 1.55 0.6 - 4.6 x10(3)/mcL    Neut # 3.25 2.1 - 9.2 x10(3)/mcL    Mono # 0.55 0.1 - 1.3 x10(3)/mcL    Eos # 0.17 0 - 0.9 x10(3)/mcL    Baso # 0.04 <=0.2 x10(3)/mcL    IG# 0.01 0 - 0.04 x10(3)/mcL    IG% 0.2 %    NRBC% 0.0 %   POCT glucose    Collection Time: 05/09/24  6:38 AM   Result Value Ref Range    POCT Glucose 94 70 - 110 mg/dL   RT Blood Gas    Collection Time: 05/09/24  9:52 AM   Result Value Ref Range    Sample Type Arterial Blood     Sample site Arterial Line     Drawn by SAMINA /raúl     pH, Blood gas 7.360 7.350 - 7.450    pCO2, Blood gas 48.0 (H) 35.0 - 45.0 mmHg    pO2, Blood gas 121.0 (H) 80.0 - 100.0 mmHg    Sodium, Blood Gas 137 137 - 145 mmol/L    Potassium, Blood Gas 3.8 3.5 - 5.0 mmol/L    Calcium Level Ionized 1.30 (H) 1.12 - 1.23 mmol/L    TOC2, Blood gas 28.6 mmol/L    Base Excess, Blood gas 1.00 -2.00 - 2.00 mmol/L    sO2, Blood gas 98.6 %    HCO3, Blood gas 27.1 (H) 22.0 - 26.0 mmol/L    THb, Blood gas 13.7 12 - 16 g/dL    O2 Hb, Blood Gas 96.6 94.0 - 97.0 %    CO Hgb 1.4 0.5 - 1.5 %    Met Hgb 1.0 0.4 - 1.5 %    Allens Test N/A     Oxygen Device, Blood gas Cannula     LPM 2    ISTAT PROCEDURE    Collection  Time: 05/09/24 11:41 AM   Result Value Ref Range    POC PH 7.332 (L) 7.35 - 7.45    POC PCO2 40.6 35 - 45 mmHg    POC PO2 52 (LL) 80 - 100 mmHg    POC HCO3 21.5 (L) 24 - 28 mmol/L    POC BE -4 (L) -2 to 2 mmol/L    POC SATURATED O2 84 95 - 100 %    POC Glucose 90 70 - 110 mg/dL    POC Sodium 146 (H) 136 - 145 mmol/L    POC Potassium 4.0 3.5 - 5.1 mmol/L    POC TCO2 23 23 - 27 mmol/L    POC Ionized Calcium 1.38 1.06 - 1.42 mmol/L    POC Hematocrit 39 36 - 54 %PCV    POC HEMOGLOBIN 13 g/dL    Sample ARTERIAL    ISTAT PROCEDURE    Collection Time: 05/09/24 12:43 PM   Result Value Ref Range    POC PH 7.281 (LL) 7.35 - 7.45    POC PCO2 49.3 (H) 35 - 45 mmHg    POC PO2 52 (LL) 80 - 100 mmHg    POC HCO3 23.2 (L) 24 - 28 mmol/L    POC BE -4 (L) -2 to 2 mmol/L    POC SATURATED O2 82 95 - 100 %    POC Glucose 102 70 - 110 mg/dL    POC Sodium 143 136 - 145 mmol/L    POC Potassium 3.7 3.5 - 5.1 mmol/L    POC TCO2 25 23 - 27 mmol/L    POC Ionized Calcium 1.33 1.06 - 1.42 mmol/L    POC Hematocrit 32 (L) 36 - 54 %PCV    POC HEMOGLOBIN 11 g/dL    Sample ARTERIAL    ISTAT PROCEDURE    Collection Time: 05/09/24  1:04 PM   Result Value Ref Range    POC PH 7.398 7.35 - 7.45    POC PCO2 36.0 35 - 45 mmHg    POC PO2 320 (H) 80 - 100 mmHg    POC HCO3 22.1 (L) 24 - 28 mmol/L    POC BE -3 (L) -2 to 2 mmol/L    POC SATURATED O2 100 95 - 100 %    POC Glucose 97 70 - 110 mg/dL    POC Sodium 144 136 - 145 mmol/L    POC Potassium 6.0 (H) 3.5 - 5.1 mmol/L    POC TCO2 23 23 - 27 mmol/L    POC Ionized Calcium 0.94 (L) 1.06 - 1.42 mmol/L    POC Hematocrit 21 (L) 36 - 54 %PCV    POC HEMOGLOBIN 7 g/dL    Sample ARTERIAL     FiO2 60    Basic Metabolic Panel    Collection Time: 05/09/24  1:08 PM   Result Value Ref Range    Sodium 141 136 - 145 mmol/L    Potassium 4.1 3.5 - 5.1 mmol/L    Chloride 117 (H) 98 - 107 mmol/L    CO2 24 23 - 31 mmol/L    Glucose 98 82 - 115 mg/dL    Blood Urea Nitrogen 11.9 9.8 - 20.1 mg/dL    Creatinine 0.68 0.55 - 1.02 mg/dL     BUN/Creatinine Ratio 18     Calcium 10.2 8.4 - 10.2 mg/dL    Anion Gap 0.0 mEq/L    eGFR >60 mL/min/1.73/m2   Protime-INR    Collection Time: 05/09/24  1:08 PM   Result Value Ref Range    PT 19.5 (H) 12.5 - 14.5 seconds    INR 1.7 (H) <=1.3   APTT    Collection Time: 05/09/24  1:08 PM   Result Value Ref Range    PTT 43.8 (H) 23.2 - 33.7 seconds   CBC with Differential    Collection Time: 05/09/24  1:08 PM   Result Value Ref Range    WBC 5.91 4.50 - 11.50 x10(3)/mcL    RBC 3.07 (L) 4.20 - 5.40 x10(6)/mcL    Hgb 9.1 (L) 12.0 - 16.0 g/dL    Hct 28.0 (L) 37.0 - 47.0 %    MCV 91.2 80.0 - 94.0 fL    MCH 29.6 27.0 - 31.0 pg    MCHC 32.5 (L) 33.0 - 36.0 g/dL    RDW 13.1 11.5 - 17.0 %    Platelet 103 (L) 130 - 400 x10(3)/mcL    MPV 10.0 7.4 - 10.4 fL    Neut % 71.3 %    Lymph % 23.0 %    Mono % 2.9 %    Eos % 2.0 %    Basophil % 0.3 %    Lymph # 1.36 0.6 - 4.6 x10(3)/mcL    Neut # 4.21 2.1 - 9.2 x10(3)/mcL    Mono # 0.17 0.1 - 1.3 x10(3)/mcL    Eos # 0.12 0 - 0.9 x10(3)/mcL    Baso # 0.02 <=0.2 x10(3)/mcL    IG# 0.03 0 - 0.04 x10(3)/mcL    IG% 0.5 %    NRBC% 0.0 %    IPF 1.9 0.9 - 11.2 %   ISTAT PROCEDURE    Collection Time: 05/09/24  1:11 PM   Result Value Ref Range    POC PH 7.388 7.35 - 7.45    POC PCO2 39.1 35 - 45 mmHg    POC PO2 41 40 - 60 mmHg    POC HCO3 23.6 (L) 24 - 28 mmol/L    POC BE -1 -2 to 2 mmol/L    POC SATURATED O2 75 95 - 100 %    POC Glucose 106 70 - 110 mg/dL    POC Sodium 145 136 - 145 mmol/L    POC Potassium 4.7 3.5 - 5.1 mmol/L    POC TCO2 25 24 - 29 mmol/L    POC Ionized Calcium 1.09 1.06 - 1.42 mmol/L    POC Hematocrit 22 (L) 36 - 54 %PCV    POC HEMOGLOBIN 8 g/dL    Sample VENOUS     FiO2 60    ISTAT PROCEDURE    Collection Time: 05/09/24  1:29 PM   Result Value Ref Range    POC PH 7.333 (L) 7.35 - 7.45    POC PCO2 52.2 (H) 35 - 45 mmHg    POC PO2 381 (H) 80 - 100 mmHg    POC HCO3 27.7 24 - 28 mmol/L    POC BE 2 -2 to 2 mmol/L    POC SATURATED O2 100 95 - 100 %    POC Glucose 115 (H) 70 - 110  mg/dL    POC Sodium 145 136 - 145 mmol/L    POC Potassium 4.9 3.5 - 5.1 mmol/L    POC TCO2 29 (H) 23 - 27 mmol/L    POC Ionized Calcium 1.20 1.06 - 1.42 mmol/L    POC Hematocrit 22 (L) 36 - 54 %PCV    POC HEMOGLOBIN 8 g/dL    Sample ARTERIAL     FiO2 80    ISTAT PROCEDURE    Collection Time: 05/09/24  2:11 PM   Result Value Ref Range    POC PH 7.378 7.35 - 7.45    POC PCO2 38.3 35 - 45 mmHg    POC PO2 314 (H) 80 - 100 mmHg    POC HCO3 22.5 (L) 24 - 28 mmol/L    POC BE -3 (L) -2 to 2 mmol/L    POC SATURATED O2 100 95 - 100 %    POC Glucose 97 70 - 110 mg/dL    POC Sodium 143 136 - 145 mmol/L    POC Potassium 4.2 3.5 - 5.1 mmol/L    POC TCO2 24 23 - 27 mmol/L    POC Ionized Calcium 1.60 (H) 1.06 - 1.42 mmol/L    POC Hematocrit 26 (L) 36 - 54 %PCV    POC HEMOGLOBIN 9 g/dL    Sample ARTERIAL     FiO2 100    POCT glucose    Collection Time: 05/09/24  2:58 PM   Result Value Ref Range    POCT Glucose 124 (H) 70 - 110 mg/dL   POCT glucose    Collection Time: 05/09/24  3:45 PM   Result Value Ref Range    POCT Glucose 199 (H) 70 - 110 mg/dL   Blood Gas (ABG)    Collection Time: 05/09/24  3:49 PM   Result Value Ref Range    Sample Type Arterial Blood     Sample site Arterial Line     Drawn by RF RRT     pH, Blood gas 7.370 7.350 - 7.450    pCO2, Blood gas 35.0 35.0 - 45.0 mmHg    pO2, Blood gas 110.0 (H) 80.0 - 100.0 mmHg    Sodium, Blood Gas 138 137 - 145 mmol/L    Potassium, Blood Gas 3.4 (L) 3.5 - 5.0 mmol/L    Calcium Level Ionized 1.26 (H) 1.12 - 1.23 mmol/L    TOC2, Blood gas 21.3 mmol/L    Base Excess, Blood gas -4.50 (L) -2.00 - 2.00 mmol/L    sO2, Blood gas 98.2 %    HCO3, Blood gas 20.2 (L) 22.0 - 26.0 mmol/L    THb, Blood gas 9.5 (L) 12 - 16 g/dL    O2 Hb, Blood Gas 98.2 (H) 94.0 - 97.0 %    CO Hgb 2.5 (H) 0.5 - 1.5 %    Met Hgb 0.2 (L) 0.4 - 1.5 %    Allens Test N/A     MODE SIMV     FIO2, Blood gas 40.0 %    Mech Vt 500 ml    Mech RR 20 b/min    PEEP 5.0 cmH2O    PS 10.0 cmH2O   POCT glucose    Collection Time:  05/09/24  5:30 PM   Result Value Ref Range    POCT Glucose 157 (H) 70 - 110 mg/dL   Blood Gas (ABG)    Collection Time: 05/09/24  6:41 PM   Result Value Ref Range    Sample Type Arterial Blood     Sample site Arterial Line     Drawn by RF RRT     pH, Blood gas 7.320 (L) 7.350 - 7.450    pCO2, Blood gas 43.0 35.0 - 45.0 mmHg    pO2, Blood gas 111.0 (H) 80.0 - 100.0 mmHg    Sodium, Blood Gas 136 (L) 137 - 145 mmol/L    Potassium, Blood Gas 3.4 (L) 3.5 - 5.0 mmol/L    Calcium Level Ionized 1.12 1.12 - 1.23 mmol/L    TOC2, Blood gas 23.5 mmol/L    Base Excess, Blood gas -3.70 (L) -2.00 - 2.00 mmol/L    sO2, Blood gas 97.9 %    HCO3, Blood gas 22.2 22.0 - 26.0 mmol/L    THb, Blood gas 8.9 (L) 12 - 16 g/dL    O2 Hb, Blood Gas 96.9 94.0 - 97.0 %    CO Hgb 1.3 0.5 - 1.5 %    Met Hgb 0.8 0.4 - 1.5 %    Allens Test N/A     MODE CPAP     FIO2, Blood gas 30.0 %    CPAP 5 cm H2O    PS 10.0 cmH2O   Hemoglobin and Hematocrit    Collection Time: 05/09/24  6:58 PM   Result Value Ref Range    Hgb 8.6 (L) 12.0 - 16.0 g/dL    Hct 25.5 (L) 37.0 - 47.0 %   Potassium    Collection Time: 05/09/24  6:58 PM   Result Value Ref Range    Potassium 3.4 (L) 3.5 - 5.1 mmol/L   Magnesium    Collection Time: 05/09/24  6:58 PM   Result Value Ref Range    Magnesium Level 2.10 1.60 - 2.60 mg/dL   POCT glucose    Collection Time: 05/09/24  7:01 PM   Result Value Ref Range    POCT Glucose 221 (H) 70 - 110 mg/dL   MRSA PCR    Collection Time: 05/09/24  7:19 PM   Result Value Ref Range    MRSA PCR SCRN ([facility]) Not Detected Not Detected   POCT glucose    Collection Time: 05/09/24  8:21 PM   Result Value Ref Range    POCT Glucose 191 (H) 70 - 110 mg/dL   POCT glucose    Collection Time: 05/09/24  9:10 PM   Result Value Ref Range    POCT Glucose 180 (H) 70 - 110 mg/dL   POCT glucose    Collection Time: 05/09/24 10:05 PM   Result Value Ref Range    POCT Glucose 159 (H) 70 - 110 mg/dL   POCT glucose    Collection Time: 05/09/24 11:11 PM   Result Value Ref Range     POCT Glucose 138 (H) 70 - 110 mg/dL   POCT glucose    Collection Time: 05/10/24 12:09 AM   Result Value Ref Range    POCT Glucose 116 (H) 70 - 110 mg/dL   POCT glucose    Collection Time: 05/10/24  1:06 AM   Result Value Ref Range    POCT Glucose 108 70 - 110 mg/dL   POCT glucose    Collection Time: 05/10/24  2:05 AM   Result Value Ref Range    POCT Glucose 106 70 - 110 mg/dL   POCT glucose    Collection Time: 05/10/24  3:04 AM   Result Value Ref Range    POCT Glucose 113 (H) 70 - 110 mg/dL   CBC Without Differential    Collection Time: 05/10/24  3:08 AM   Result Value Ref Range    WBC 7.85 4.50 - 11.50 x10(3)/mcL    RBC 3.59 (L) 4.20 - 5.40 x10(6)/mcL    Hgb 10.4 (L) 12.0 - 16.0 g/dL    Hct 31.6 (L) 37.0 - 47.0 %    MCV 88.0 80.0 - 94.0 fL    MCH 29.0 27.0 - 31.0 pg    MCHC 32.9 (L) 33.0 - 36.0 g/dL    RDW 14.4 11.5 - 17.0 %    Platelet 98 (L) 130 - 400 x10(3)/mcL    MPV 10.8 (H) 7.4 - 10.4 fL    IPF 2.6 0.9 - 11.2 %    NRBC% 0.0 %   Comprehensive Metabolic Panel    Collection Time: 05/10/24  3:08 AM   Result Value Ref Range    Sodium 136 136 - 145 mmol/L    Potassium 4.0 3.5 - 5.1 mmol/L    Chloride 111 (H) 98 - 107 mmol/L    CO2 21 (L) 23 - 31 mmol/L    Glucose 111 82 - 115 mg/dL    Blood Urea Nitrogen 7.5 (L) 9.8 - 20.1 mg/dL    Creatinine 0.67 0.55 - 1.02 mg/dL    Calcium 8.0 (L) 8.4 - 10.2 mg/dL    Protein Total 4.7 (L) 5.8 - 7.6 gm/dL    Albumin 3.4 3.4 - 4.8 g/dL    Globulin 1.3 (L) 2.4 - 3.5 gm/dL    Albumin/Globulin Ratio 2.6 (H) 1.1 - 2.0 ratio    Bilirubin Total 1.0 <=1.5 mg/dL    ALP 36 (L) 40 - 150 unit/L    ALT 11 0 - 55 unit/L    AST 34 5 - 34 unit/L    eGFR >60 mL/min/1.73/m2   POCT glucose    Collection Time: 05/10/24  4:08 AM   Result Value Ref Range    POCT Glucose 111 (H) 70 - 110 mg/dL   POCT glucose    Collection Time: 05/10/24  5:04 AM   Result Value Ref Range    POCT Glucose 107 70 - 110 mg/dL   POCT glucose    Collection Time: 05/10/24  5:59 AM   Result Value Ref Range    POCT Glucose  138 (H) 70 - 110 mg/dL   POCT glucose    Collection Time: 05/10/24  7:46 AM   Result Value Ref Range    POCT Glucose 115 (H) 70 - 110 mg/dL   POCT glucose    Collection Time: 05/10/24 11:08 AM   Result Value Ref Range    POCT Glucose 106 70 - 110 mg/dL   POCT glucose    Collection Time: 05/10/24  8:07 PM   Result Value Ref Range    POCT Glucose 101 70 - 110 mg/dL   POCT glucose    Collection Time: 05/11/24 12:21 AM   Result Value Ref Range    POCT Glucose 128 (H) 70 - 110 mg/dL   EKG 12-lead    Collection Time: 05/11/24  4:05 AM   Result Value Ref Range    QRS Duration 70 ms    [facility] QTC Calculation 400 ms   POCT glucose    Collection Time: 05/11/24  4:35 AM   Result Value Ref Range    POCT Glucose 94 70 - 110 mg/dL   Comprehensive Metabolic Panel    Collection Time: 05/11/24  6:00 AM   Result Value Ref Range    Sodium 139 136 - 145 mmol/L    Potassium 4.8 3.5 - 5.1 mmol/L    Chloride 108 (H) 98 - 107 mmol/L    CO2 23 23 - 31 mmol/L    Glucose 108 82 - 115 mg/dL    Blood Urea Nitrogen 7.8 (L) 9.8 - 20.1 mg/dL    Creatinine 0.72 0.55 - 1.02 mg/dL    Calcium 9.3 8.4 - 10.2 mg/dL    Protein Total 5.6 (L) 5.8 - 7.6 gm/dL    Albumin 3.1 (L) 3.4 - 4.8 g/dL    Globulin 2.5 2.4 - 3.5 gm/dL    Albumin/Globulin Ratio 1.2 1.1 - 2.0 ratio    Bilirubin Total 1.2 <=1.5 mg/dL    ALP 55 40 - 150 unit/L    ALT 13 0 - 55 unit/L    AST 32 5 - 34 unit/L    eGFR >60 mL/min/1.73/m2   Magnesium    Collection Time: 05/11/24  6:00 AM   Result Value Ref Range    Magnesium Level 1.90 1.60 - 2.60 mg/dL   CBC with Differential    Collection Time: 05/11/24  6:00 AM   Result Value Ref Range    WBC 10.09 4.50 - 11.50 x10(3)/mcL    RBC 4.11 (L) 4.20 - 5.40 x10(6)/mcL    Hgb 12.0 12.0 - 16.0 g/dL    Hct 35.5 (L) 37.0 - 47.0 %    MCV 86.4 80.0 - 94.0 fL    MCH 29.2 27.0 - 31.0 pg    MCHC 33.8 33.0 - 36.0 g/dL    RDW 14.4 11.5 - 17.0 %    Platelet 98 (L) 130 - 400 x10(3)/mcL    MPV 11.5 (H) 7.4 - 10.4 fL    Neut % 80.2 %    Lymph % 11.3 %    Mono % 7.5  %    Eos % 0.4 %    Basophil % 0.2 %    Lymph # 1.14 0.6 - 4.6 x10(3)/mcL    Neut # 8.09 2.1 - 9.2 x10(3)/mcL    Mono # 0.76 0.1 - 1.3 x10(3)/mcL    Eos # 0.04 0 - 0.9 x10(3)/mcL    Baso # 0.02 <=0.2 x10(3)/mcL    IG# 0.04 0 - 0.04 x10(3)/mcL    IG% 0.4 %    NRBC% 0.0 %     EKG:      Telemetry: SR    Physical Exam  Vitals reviewed.   Constitutional:       General: She is not in acute distress.     Appearance: Normal appearance. She is obese.   HENT:      Head: Normocephalic.      Mouth/Throat:      Mouth: Mucous membranes are moist.   Eyes:      Conjunctiva/sclera: Conjunctivae normal.   Cardiovascular:      Rate and Rhythm: Normal rate and regular rhythm.      Pulses: Normal pulses.      Heart sounds: Normal heart sounds. No murmur heard.  Pulmonary:      Effort: Pulmonary effort is normal. No respiratory distress.      Breath sounds: Decreased breath sounds present.      Comments: NC O2  Abdominal:      Palpations: Abdomen is soft.   Skin:     General: Skin is warm.      Comments: R Wrist Soft/Flat, Non-Tender, No Sign of Bleed/Infection. +2 BLE Palpable Radial Pulses. Midline Sternotomy Dressing C/D/I. + CTs    Neurological:      Mental Status: She is alert and oriented to person, place, and time. Mental status is at baseline.   Psychiatric:         Mood and Affect: Mood normal.         Behavior: Behavior normal.         Judgment: Judgment normal.       Current Inpatient Medications:  Current Facility-Administered Medications:     0.9%  NaCl infusion (for blood administration), , Intravenous, Q24H PRN, Lissy London FNP    acetaminophen tablet 650 mg, 650 mg, Oral, Q4H PRN, Celso Watts Jr., MD, 650 mg at 05/07/24 1230    albumin human 5% bottle 12.5 g, 12.5 g, Intravenous, PRN, Anna Mitchell PA, Stopped at 05/10/24 0157    aspirin EC tablet 81 mg, 81 mg, Oral, Daily, Anna Mitchell PA, 81 mg at 05/11/24 0900    atorvastatin tablet 80 mg, 80 mg, Oral, QHS, Danuta Cheek, MITCHELL, 80 mg at 05/10/24  2048    calcium gluconate 1 g in NS IVPB (premixed), 1 g, Intravenous, PRN, Anna Mtichell PA    calcium gluconate 1 g in NS IVPB (premixed), 2 g, Intravenous, PRN, Anna Mitchell PA    calcium gluconate 1 g in NS IVPB (premixed), 3 g, Intravenous, PRN, Anna Mitchell PA    cefUROXime sodium 1.5 g in dextrose 5 % in water (D5W) 100 mL IVPB (MB+), 1.5 g, Intravenous, On Call Procedure, Quentin Peterson MD, 1.5 g at 05/09/24 1142    dexmedetomidine (PRECEDEX) 400mcg/100mL 0.9% NaCL infusion, 0-1.4 mcg/kg/hr, Intravenous, Continuous, Anna Mitchell PA, Last Rate: 11.805 mL/hr at 05/09/24 1325, 0.6 mcg/kg/hr at 05/09/24 1325    dextrose 10% bolus 125 mL 125 mL, 12.5 g, Intravenous, PRN, Anna Mitchell PA    dextrose 10% bolus 250 mL 250 mL, 25 g, Intravenous, PRN, Anna Mitchell PA    dextrose 5 % and 0.45 % NaCl infusion, , Intravenous, Continuous, Anna Mitchell PA, Stopped at 05/10/24 1217    docusate sodium capsule 100 mg, 100 mg, Oral, BID, Anna Mitchell PA, 100 mg at 05/11/24 0942    enoxaparin injection 40 mg, 40 mg, Subcutaneous, Daily, Lissy London FNP, 40 mg at 05/10/24 1700    EPINEPHrine (ADRENALIN) 5 mg in dextrose 5 % (D5W) 250 mL infusion, 0-2 mcg/kg/min (Dosing Weight), Intravenous, Continuous, Anna Mitchell PA, Stopped at 05/09/24 2200    famotidine tablet 20 mg, 20 mg, Oral, Daily, Nehemias Talamantes MD, 20 mg at 05/11/24 0950    folic acid tablet 1 mg, 1 mg, Oral, Daily, Anna Mitchell PA, 1 mg at 05/11/24 0942    hydrALAZINE injection 10 mg, 10 mg, Intravenous, Q4H PRN, Celso Watts Jr., MD    HYDROcodone-acetaminophen 5-325 mg per tablet 1 tablet, 1 tablet, Oral, Q4H PRN, Celso Watts Jr., MD, 1 tablet at 05/10/24 0821    HYDROcodone-acetaminophen 5-325 mg per tablet 1 tablet, 1 tablet, Oral, Q4H PRN, Anna Mitchell PA    insulin regular in 0.9 % NaCl 100 unit/100 mL (1 unit/mL) infusion, 0-52 Units/hr, Intravenous, Continuous, Anna Mitchell,  PA, Stopped at 05/10/24 1643    ketorolac injection 30 mg, 30 mg, Intravenous, Q6H, Celso Carlson PA-C, 30 mg at 05/11/24 1321    lactulose 10 gram/15 ml solution 20 g, 20 g, Oral, Q6H PRN, Anna Mitchell PA    loperamide capsule 2 mg, 2 mg, Oral, Continuous PRN, Anna Mitchell PA    magnesium sulfate 2g in water 50mL IVPB (premix), 2 g, Intravenous, PRN, Anna Mitchell PA    magnesium sulfate 2g in water 50mL IVPB (premix), 4 g, Intravenous, PRN, Anna Mitchell PA    metoclopramide injection 5 mg, 5 mg, Intravenous, Q6H PRN, Anna Mitchell PA    metoprolol tartrate (LOPRESSOR) split tablet 12.5 mg, 12.5 mg, Oral, BID, Anna Mitchell PA 12.5 mg at 05/11/24 0951    morphine injection 2 mg, 2 mg, Intravenous, Q4H PRN, Celso Watts Jr., MD    morphine injection 4 mg, 4 mg, Intravenous, Q4H PRN, Anna Mitchell PA, 4 mg at 05/09/24 1834    mupirocin 2 % ointment, , Nasal, On Call Procedure, Nehemias Talamantes MD    mupirocin 2 % ointment, , Nasal, BID, Anna Mitchell PA, Given at 05/11/24 0942    nitroGLYCERIN SL tablet 0.4 mg, 0.4 mg, Sublingual, Q5 Min PRN, Aubrey Davis III, MD    ondansetron injection 4 mg, 4 mg, Intravenous, Q8H PRN, Celso Watts Jr., MD    ondansetron injection 4 mg, 4 mg, Intravenous, Q4H PRN, Anna Mitchell PA, 4 mg at 05/10/24 0823    oxyCODONE immediate release tablet 5 mg, 5 mg, Oral, Q4H PRN, Nehemias Talamantes MD, 5 mg at 05/11/24 0655    oxyCODONE immediate release tablet Tab 10 mg, 10 mg, Oral, Q4H PRN, Anna Mitchell PA, 10 mg at 05/10/24 1640    potassium chloride 20 mEq in 100 mL IVPB (FOR CENTRAL LINE ADMINISTRATION ONLY), 20 mEq, Intravenous, PRN, Anna Mitchell PA    potassium chloride 20 mEq in 100 mL IVPB (FOR CENTRAL LINE ADMINISTRATION ONLY), 40 mEq, Intravenous, PRN, Anna Mitchell PA, Stopped at 05/10/24 0013    potassium chloride 20 mEq in 100 mL IVPB (FOR CENTRAL LINE ADMINISTRATION ONLY), 60 mEq, Intravenous,  PRN, Anna Mitchell PA    sodium chloride 0.9% flush 10 mL, 10 mL, Intravenous, PRN, Aubrey Davis III, MD    sodium chloride 0.9% flush 10 mL, 10 mL, Intravenous, PRN, Danuta Cheek NP    sucralfate tablet 1 g, 1 g, Oral, QID (AC & HS), Anna Mitchell PA, 1 g at 05/11/24 1322  VTE Risk Mitigation (From admission, onward)           Ordered     enoxaparin injection 40 mg  Daily         05/10/24 1227     IP VTE HIGH RISK PATIENT  Once         05/10/24 1227     Place MARISELA hose  Until discontinued         05/09/24 1455     Place sequential compression device  Until discontinued         05/05/24 1244                  Assessment:   MVCAD    - 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 to LAD, SV to RCA Streaker; LAAL    - LHC (5.6.24): Severe CAD; Mid LAD 60-70% Stenosis IFR 0.96, Ostial Left Circumflex 95%, Prox RCA 90%    - PET (5.2.24): Concern for Anterior ischemia    - ECHO (5.2.24) - LVEF 60%  Acute Respiratory Failure Requiring Intubation/Ventilation - Now on NC   Hypotension requiring Pressors - Resolved     - History of HTN  Unstable Angina - Resolved   HLD  GERD  Obesity   No Hx of GIB    Plan:   Continue ASA, BB and Statin   Aggressive Mobilization of PT and Q1HR IS  Labs in AM: CBC, CMP and Mg     YELENA Salinas  Cardiology  [facility]  05/11/2024    I agree with the findings of the complexity of problems addressed and take responsibility for the plan's risks and complications. I approved the plan documented by Brandon Rodriguez NP.    Meds reviewed   Routine pos-op care

## 2024-05-11 NOTE — NURSING
Nurses Note -- 4 Eyes      5/11/2024   2:15 AM      Skin assessed during: Daily Assessment      [x] No Altered Skin Integrity Present    [x]Prevention Measures Documented      [] Yes- Altered Skin Integrity Present or Discovered   [] LDA Added if Not in Epic (Describe Wound)   [] New Altered Skin Integrity was Present on Admit and Documented in LDA   [] Wound Image Taken    Wound Care Consulted? No    Attending Nurse:  Jose Manuel Vital RN/Staff Member:  Yovanny DENSON

## 2024-05-12 LAB
ALBUMIN SERPL-MCNC: 2.8 G/DL (ref 3.4–4.8)
ALBUMIN/GLOB SERPL: 1 RATIO (ref 1.1–2)
ALP SERPL-CCNC: 55 UNIT/L (ref 40–150)
ALT SERPL-CCNC: 13 UNIT/L (ref 0–55)
AST SERPL-CCNC: 28 UNIT/L (ref 5–34)
BASOPHILS # BLD AUTO: 0.04 X10(3)/MCL
BASOPHILS NFR BLD AUTO: 0.5 %
BILIRUB SERPL-MCNC: 1 MG/DL
BUN SERPL-MCNC: 10.4 MG/DL (ref 9.8–20.1)
CALCIUM SERPL-MCNC: 9.2 MG/DL (ref 8.4–10.2)
CHLORIDE SERPL-SCNC: 108 MMOL/L (ref 98–107)
CO2 SERPL-SCNC: 25 MMOL/L (ref 23–31)
CREAT SERPL-MCNC: 0.69 MG/DL (ref 0.55–1.02)
EOSINOPHIL # BLD AUTO: 0.14 X10(3)/MCL (ref 0–0.9)
EOSINOPHIL NFR BLD AUTO: 1.6 %
ERYTHROCYTE [DISTWIDTH] IN BLOOD BY AUTOMATED COUNT: 14.3 % (ref 11.5–17)
ERYTHROCYTE [DISTWIDTH] IN BLOOD BY AUTOMATED COUNT: 14.3 % (ref 11.5–17)
GFR SERPLBLD CREATININE-BSD FMLA CKD-EPI: >60 ML/MIN/1.73/M2
GLOBULIN SER-MCNC: 2.8 GM/DL (ref 2.4–3.5)
GLUCOSE SERPL-MCNC: 92 MG/DL (ref 82–115)
HCT VFR BLD AUTO: 38.6 % (ref 37–47)
HCT VFR BLD AUTO: 38.6 % (ref 37–47)
HGB BLD-MCNC: 12.5 G/DL (ref 12–16)
HGB BLD-MCNC: 12.5 G/DL (ref 12–16)
IMM GRANULOCYTES # BLD AUTO: 0.03 X10(3)/MCL (ref 0–0.04)
IMM GRANULOCYTES NFR BLD AUTO: 0.3 %
LYMPHOCYTES # BLD AUTO: 1.4 X10(3)/MCL (ref 0.6–4.6)
LYMPHOCYTES NFR BLD AUTO: 15.8 %
MAGNESIUM SERPL-MCNC: 2.1 MG/DL (ref 1.6–2.6)
MCH RBC QN AUTO: 29.1 PG (ref 27–31)
MCH RBC QN AUTO: 29.1 PG (ref 27–31)
MCHC RBC AUTO-ENTMCNC: 32.4 G/DL (ref 33–36)
MCHC RBC AUTO-ENTMCNC: 32.4 G/DL (ref 33–36)
MCV RBC AUTO: 89.8 FL (ref 80–94)
MCV RBC AUTO: 89.8 FL (ref 80–94)
MONOCYTES # BLD AUTO: 0.77 X10(3)/MCL (ref 0.1–1.3)
MONOCYTES NFR BLD AUTO: 8.7 %
NEUTROPHILS # BLD AUTO: 6.47 X10(3)/MCL (ref 2.1–9.2)
NEUTROPHILS NFR BLD AUTO: 73.1 %
NRBC BLD AUTO-RTO: 0 %
NRBC BLD AUTO-RTO: 0 %
PLATELET # BLD AUTO: 108 X10(3)/MCL (ref 130–400)
PLATELET # BLD AUTO: 108 X10(3)/MCL (ref 130–400)
PLATELETS.RETICULATED NFR BLD AUTO: 4.4 % (ref 0.9–11.2)
PLATELETS.RETICULATED NFR BLD AUTO: 4.4 % (ref 0.9–11.2)
PMV BLD AUTO: 11 FL (ref 7.4–10.4)
PMV BLD AUTO: 11 FL (ref 7.4–10.4)
POTASSIUM SERPL-SCNC: 4.3 MMOL/L (ref 3.5–5.1)
PROT SERPL-MCNC: 5.6 GM/DL (ref 5.8–7.6)
RBC # BLD AUTO: 4.3 X10(6)/MCL (ref 4.2–5.4)
RBC # BLD AUTO: 4.3 X10(6)/MCL (ref 4.2–5.4)
RBCS: NORMAL
SODIUM SERPL-SCNC: 139 MMOL/L (ref 136–145)
WBC # SPEC AUTO: 8.85 X10(3)/MCL (ref 4.5–11.5)
WBC # SPEC AUTO: 8.85 X10(3)/MCL (ref 4.5–11.5)

## 2024-05-12 PROCEDURE — 25000003 PHARM REV CODE 250: Performed by: NURSE PRACTITIONER

## 2024-05-12 PROCEDURE — 25000003 PHARM REV CODE 250: Performed by: PHYSICIAN ASSISTANT

## 2024-05-12 PROCEDURE — 85027 COMPLETE CBC AUTOMATED: CPT | Performed by: PHYSICIAN ASSISTANT

## 2024-05-12 PROCEDURE — 36415 COLL VENOUS BLD VENIPUNCTURE: CPT | Performed by: NURSE PRACTITIONER

## 2024-05-12 PROCEDURE — 85025 COMPLETE CBC W/AUTO DIFF WBC: CPT | Performed by: NURSE PRACTITIONER

## 2024-05-12 PROCEDURE — 25000003 PHARM REV CODE 250: Performed by: THORACIC SURGERY (CARDIOTHORACIC VASCULAR SURGERY)

## 2024-05-12 PROCEDURE — 80053 COMPREHEN METABOLIC PANEL: CPT | Performed by: PHYSICIAN ASSISTANT

## 2024-05-12 PROCEDURE — 97162 PT EVAL MOD COMPLEX 30 MIN: CPT

## 2024-05-12 PROCEDURE — 21400001 HC TELEMETRY ROOM

## 2024-05-12 PROCEDURE — 94760 N-INVAS EAR/PLS OXIMETRY 1: CPT

## 2024-05-12 PROCEDURE — 63600175 PHARM REV CODE 636 W HCPCS: Performed by: PHYSICIAN ASSISTANT

## 2024-05-12 PROCEDURE — 99024 POSTOP FOLLOW-UP VISIT: CPT | Mod: ,,, | Performed by: PHYSICIAN ASSISTANT

## 2024-05-12 PROCEDURE — 97110 THERAPEUTIC EXERCISES: CPT

## 2024-05-12 PROCEDURE — 63600175 PHARM REV CODE 636 W HCPCS

## 2024-05-12 PROCEDURE — 83735 ASSAY OF MAGNESIUM: CPT | Performed by: NURSE PRACTITIONER

## 2024-05-12 RX ORDER — METOPROLOL TARTRATE 25 MG/1
25 TABLET, FILM COATED ORAL 2 TIMES DAILY
Status: DISCONTINUED | OUTPATIENT
Start: 2024-05-12 | End: 2024-05-14 | Stop reason: HOSPADM

## 2024-05-12 RX ADMIN — OXYCODONE HYDROCHLORIDE 5 MG: 5 TABLET ORAL at 10:05

## 2024-05-12 RX ADMIN — KETOROLAC TROMETHAMINE 30 MG: 30 INJECTION, SOLUTION INTRAMUSCULAR at 06:05

## 2024-05-12 RX ADMIN — SUCRALFATE 1 G: 1 TABLET ORAL at 08:05

## 2024-05-12 RX ADMIN — ASPIRIN 81 MG: 81 TABLET, COATED ORAL at 08:05

## 2024-05-12 RX ADMIN — DOCUSATE SODIUM 100 MG: 100 CAPSULE, LIQUID FILLED ORAL at 08:05

## 2024-05-12 RX ADMIN — FOLIC ACID 1 MG: 1 TABLET ORAL at 08:05

## 2024-05-12 RX ADMIN — METOPROLOL TARTRATE 12.5 MG: 25 TABLET, FILM COATED ORAL at 08:05

## 2024-05-12 RX ADMIN — SUCRALFATE 1 G: 1 TABLET ORAL at 06:05

## 2024-05-12 RX ADMIN — ENOXAPARIN SODIUM 40 MG: 40 INJECTION SUBCUTANEOUS at 06:05

## 2024-05-12 RX ADMIN — FAMOTIDINE 20 MG: 20 TABLET, FILM COATED ORAL at 08:05

## 2024-05-12 RX ADMIN — METOPROLOL TARTRATE 25 MG: 25 TABLET, FILM COATED ORAL at 08:05

## 2024-05-12 RX ADMIN — ATORVASTATIN CALCIUM 80 MG: 40 TABLET, FILM COATED ORAL at 08:05

## 2024-05-12 RX ADMIN — KETOROLAC TROMETHAMINE 30 MG: 30 INJECTION, SOLUTION INTRAMUSCULAR at 12:05

## 2024-05-12 NOTE — NURSING
Nurses Note -- 4 Eyes      5/11/2024   10:57 PM      Skin assessed during: Daily Assessment      [x] No Altered Skin Integrity Present    [x]Prevention Measures Documented      [] Yes- Altered Skin Integrity Present or Discovered   [] LDA Added if Not in Epic (Describe Wound)   [] New Altered Skin Integrity was Present on Admit and Documented in LDA   [] Wound Image Taken    Wound Care Consulted? No    Attending Nurse:  Mj Vital RN/Staff Member:  Yovanny DENSON

## 2024-05-12 NOTE — PLAN OF CARE
Problem: Physical Therapy  Goal: Physical Therapy Goal  Description: Goals to be met by: 24     Patient will increase functional independence with mobility by performin. Supine to sit with Stand-by Assistance  2. Sit to stand transfer with Supervision  3. Gait  x 200 feet with Supervision using Rolling Walker.     Outcome: Progressing

## 2024-05-12 NOTE — PROGRESS NOTES
"[facility]    Cardiology  Progress Note    Patient Name: Tanvi Miranda  MRN: [id number]  Admission Date: 5/5/2024  Hospital Length of Stay: 7 days  Code Status: Full Code   Attending Physician: Nehemias Talamantes MD   Primary Care Physician: Rupinder, Primary Doctor  Expected Discharge Date:   Principal Problem:<principal problem not specified>    Subjective:     Brief HPI/Hospital Course: 68-year-old female that is known to Dr. Doe with a PMHx of HLD, HTN, GERD and family Hx of early CAD. She presented to [facility] ED on 5.5.24 with complaints of left sided chest pain. Patient recently had a VAN/PET and ECHO completed by Dr. Doe and was planned to follow up 5.9.24 for results. She reports associated SOB and fatugue with exertion. She also report radiating left arm pain. Her last [facility] was back in 2018. ED Course: HR 66, /84, RR 20, Temp 98.9F SpO2 100% on RA. Lab work showed: WBC 3.98, K 4.1, BUN/Cr 10.3/0.78, Ca 10.1, AST/ALT 28/23, BNP 12.7, Trop: <0.010 X2, EKG shows no acute ST changes. [facility] will admit for chest pain.      5.7.24:  NAD. VSS. No complaints of CP/SOB/Palps. "I feel okay"  5.8.24: NAD. VSS. No complaints of CP/SOB/Palps. "I feel okay" CV surgery planning CABG for tomorrow. Resting comfortably in bed.  5.9.24: NAD. Intubated/Sedated. VSS. On Epi and Precedex   5.10.24: NAD. Sitting in Bedside Chair. Denies SOB and Palps. + CP/Incisional. Insulin Drip per CVS. "I am ok, just some pain."   5.11.24: NAD. "I am feeling well." Denies SOB and Palps. + CP/Incisional.   5.12.24: NAD. "I am ok." Sitting in Bedside Chair. Denies SOB and Palps. + CP/Incisional.     PMH: HLD, HTN, GERD and family Hx of early CAD  PSH: Colonoscopy, tonsillectomy, Hysterectomy   Family History: Mother: CAD s/p PCI, Alzheimers, PAD, Brother & Sister: CAD  Social History: Denies Tobacco, illicit drug, or ETOH use.      Previous Cardiac Diagnostics:   [facility] (5.6.24):  There is significant coronary artery disease. Mid Left " Anterior Descending has a 60-70% stenosis. Instantaneous wave-free ratio (iFR) was performed on the lesion, and found to be non-hemodynamically significant at 0.96. 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 Ostial Left Circumflex has a 95% stenosis. The distal vessel is supplied via left-to-right collaterals from the LAD (septal perforating branches). Prox Right Coronary Artery has a calcified 90% stenosis. The vessel is moderately tortuous. The distal vessel is supplied via left-to-right collaterals from the LAD (septal perforating branches). LVEDP is normal at 13 mmHg.    [facility] PET (5.2.24):  The left ventricular cavity is noted to be normal on the stress studies. The stress left ventricular ejection fraction was calculated to be 63% and left ventricular global function is normal. The rest left ventricular cavity is noted to be normal. The rest left ventricular ejection fraction was calculated to be 50% and rest left ventricular global function is normal.   When compared to the resting ejection fraction (50%), the stress ejection fraction (63%) has increased.   There was a rise in myocardial blood flow between rest and stress.  Global myocardial blood flow reserve was 2.88.  Myocardial blood flow reserve is reduced in the inferior territory which is suggestive of flow limiting stenosis in this territory.  *FINAL READ NOT DONE*     ECHO (5.2.24):  The study quality is average.   The left ventricle is normal in size. Global left ventricular systolic function is normal. The left ventricular ejection fraction is 60%.   Normal appearing valvular structures and function are within study limits.   The pulmonary artery systolic pressure is 10 mmHg. The pulmonary artery appears to be normal.     [facility] ( 8.3.18):  LAD: arrises right from the aorta there is no LM. Significant calcification present in LAD. There is a 40-50% stenosis in the midpoint of the LAD. D2 is moderate in size with 20-30% stenosis   RCA:  large prominent vessel. It is dominant. 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, Shows a long tubular  50% stenosis proximally with heavy calcification,  No obstructive disease was found   PLAN: Medical management     Review of Systems   Constitutional: Negative for malaise/fatigue.   Cardiovascular:  Positive for chest pain (Incisional). Negative for irregular heartbeat, orthopnea and palpitations.   Respiratory:  Negative for shortness of breath.    All other systems reviewed and are negative.    Objective:     Vital Signs (Most Recent):  Temp: 98.1 °F (36.7 °C) (05/12/24 0400)  Pulse: 99 (05/12/24 0900)  Resp: (!) 27 (05/12/24 0900)  BP: 109/70 (05/12/24 0900)  SpO2: 96 % (05/12/24 0900) Vital Signs (24h Range):  Temp:  [98 °F (36.7 °C)-99.1 °F (37.3 °C)] 98.1 °F (36.7 °C)  Pulse:  [] 99  Resp:  [20-35] 27  SpO2:  [91 %-97 %] 96 %  BP: (102-133)/(65-91) 109/70   Weight: 82.6 kg (182 lb 1.6 oz)  Body mass index is 32.26 kg/m².  SpO2: 96 %       Intake/Output Summary (Last 24 hours) at 5/12/2024 1143  Last data filed at 5/12/2024 0800  Gross per 24 hour   Intake --   Output 750 ml   Net -750 ml     Lines/Drains/Airways       Drain  Duration                  Chest Tube 05/09/24 Left 28 Fr. 3 days              Peripheral Intravenous Line  Duration                  Peripheral IV - Single Lumen 05/05/24 1201 20 G Anterior;Proximal;Right Forearm 6 days                  Significant Labs:   Recent Results (from the past 72 hour(s))   ISTAT PROCEDURE    Collection Time: 05/09/24 12:43 PM   Result Value Ref Range    POC PH 7.281 (LL) 7.35 - 7.45    POC PCO2 49.3 (H) 35 - 45 mmHg    POC PO2 52 (LL) 80 - 100 mmHg    POC HCO3 23.2 (L) 24 - 28 mmol/L    POC BE -4 (L) -2 to 2 mmol/L    POC SATURATED O2 82 95 - 100 %    POC Glucose 102 70 - 110 mg/dL    POC Sodium 143 136 - 145 mmol/L    POC  Potassium 3.7 3.5 - 5.1 mmol/L    POC TCO2 25 23 - 27 mmol/L    POC Ionized Calcium 1.33 1.06 - 1.42 mmol/L    POC Hematocrit 32 (L) 36 - 54 %PCV    POC HEMOGLOBIN 11 g/dL    Sample ARTERIAL    ISTAT PROCEDURE    Collection Time: 05/09/24  1:04 PM   Result Value Ref Range    POC PH 7.398 7.35 - 7.45    POC PCO2 36.0 35 - 45 mmHg    POC PO2 320 (H) 80 - 100 mmHg    POC HCO3 22.1 (L) 24 - 28 mmol/L    POC BE -3 (L) -2 to 2 mmol/L    POC SATURATED O2 100 95 - 100 %    POC Glucose 97 70 - 110 mg/dL    POC Sodium 144 136 - 145 mmol/L    POC Potassium 6.0 (H) 3.5 - 5.1 mmol/L    POC TCO2 23 23 - 27 mmol/L    POC Ionized Calcium 0.94 (L) 1.06 - 1.42 mmol/L    POC Hematocrit 21 (L) 36 - 54 %PCV    POC HEMOGLOBIN 7 g/dL    Sample ARTERIAL     FiO2 60    Basic Metabolic Panel    Collection Time: 05/09/24  1:08 PM   Result Value Ref Range    Sodium 141 136 - 145 mmol/L    Potassium 4.1 3.5 - 5.1 mmol/L    Chloride 117 (H) 98 - 107 mmol/L    CO2 24 23 - 31 mmol/L    Glucose 98 82 - 115 mg/dL    Blood Urea Nitrogen 11.9 9.8 - 20.1 mg/dL    Creatinine 0.68 0.55 - 1.02 mg/dL    BUN/Creatinine Ratio 18     Calcium 10.2 8.4 - 10.2 mg/dL    Anion Gap 0.0 mEq/L    eGFR >60 mL/min/1.73/m2   Protime-INR    Collection Time: 05/09/24  1:08 PM   Result Value Ref Range    PT 19.5 (H) 12.5 - 14.5 seconds    INR 1.7 (H) <=1.3   APTT    Collection Time: 05/09/24  1:08 PM   Result Value Ref Range    PTT 43.8 (H) 23.2 - 33.7 seconds   CBC with Differential    Collection Time: 05/09/24  1:08 PM   Result Value Ref Range    WBC 5.91 4.50 - 11.50 x10(3)/mcL    RBC 3.07 (L) 4.20 - 5.40 x10(6)/mcL    Hgb 9.1 (L) 12.0 - 16.0 g/dL    Hct 28.0 (L) 37.0 - 47.0 %    MCV 91.2 80.0 - 94.0 fL    MCH 29.6 27.0 - 31.0 pg    MCHC 32.5 (L) 33.0 - 36.0 g/dL    RDW 13.1 11.5 - 17.0 %    Platelet 103 (L) 130 - 400 x10(3)/mcL    MPV 10.0 7.4 - 10.4 fL    Neut % 71.3 %    Lymph % 23.0 %    Mono % 2.9 %    Eos % 2.0 %    Basophil % 0.3 %    Lymph # 1.36 0.6 - 4.6  x10(3)/mcL    Neut # 4.21 2.1 - 9.2 x10(3)/mcL    Mono # 0.17 0.1 - 1.3 x10(3)/mcL    Eos # 0.12 0 - 0.9 x10(3)/mcL    Baso # 0.02 <=0.2 x10(3)/mcL    IG# 0.03 0 - 0.04 x10(3)/mcL    IG% 0.5 %    NRBC% 0.0 %    IPF 1.9 0.9 - 11.2 %   ISTAT PROCEDURE    Collection Time: 05/09/24  1:11 PM   Result Value Ref Range    POC PH 7.388 7.35 - 7.45    POC PCO2 39.1 35 - 45 mmHg    POC PO2 41 40 - 60 mmHg    POC HCO3 23.6 (L) 24 - 28 mmol/L    POC BE -1 -2 to 2 mmol/L    POC SATURATED O2 75 95 - 100 %    POC Glucose 106 70 - 110 mg/dL    POC Sodium 145 136 - 145 mmol/L    POC Potassium 4.7 3.5 - 5.1 mmol/L    POC TCO2 25 24 - 29 mmol/L    POC Ionized Calcium 1.09 1.06 - 1.42 mmol/L    POC Hematocrit 22 (L) 36 - 54 %PCV    POC HEMOGLOBIN 8 g/dL    Sample VENOUS     FiO2 60    ISTAT PROCEDURE    Collection Time: 05/09/24  1:29 PM   Result Value Ref Range    POC PH 7.333 (L) 7.35 - 7.45    POC PCO2 52.2 (H) 35 - 45 mmHg    POC PO2 381 (H) 80 - 100 mmHg    POC HCO3 27.7 24 - 28 mmol/L    POC BE 2 -2 to 2 mmol/L    POC SATURATED O2 100 95 - 100 %    POC Glucose 115 (H) 70 - 110 mg/dL    POC Sodium 145 136 - 145 mmol/L    POC Potassium 4.9 3.5 - 5.1 mmol/L    POC TCO2 29 (H) 23 - 27 mmol/L    POC Ionized Calcium 1.20 1.06 - 1.42 mmol/L    POC Hematocrit 22 (L) 36 - 54 %PCV    POC HEMOGLOBIN 8 g/dL    Sample ARTERIAL     FiO2 80    ISTAT PROCEDURE    Collection Time: 05/09/24  2:11 PM   Result Value Ref Range    POC PH 7.378 7.35 - 7.45    POC PCO2 38.3 35 - 45 mmHg    POC PO2 314 (H) 80 - 100 mmHg    POC HCO3 22.5 (L) 24 - 28 mmol/L    POC BE -3 (L) -2 to 2 mmol/L    POC SATURATED O2 100 95 - 100 %    POC Glucose 97 70 - 110 mg/dL    POC Sodium 143 136 - 145 mmol/L    POC Potassium 4.2 3.5 - 5.1 mmol/L    POC TCO2 24 23 - 27 mmol/L    POC Ionized Calcium 1.60 (H) 1.06 - 1.42 mmol/L    POC Hematocrit 26 (L) 36 - 54 %PCV    POC HEMOGLOBIN 9 g/dL    Sample ARTERIAL     FiO2 100    POCT glucose    Collection Time: 05/09/24  2:58 PM    Result Value Ref Range    POCT Glucose 124 (H) 70 - 110 mg/dL   POCT glucose    Collection Time: 05/09/24  3:45 PM   Result Value Ref Range    POCT Glucose 199 (H) 70 - 110 mg/dL   Blood Gas (ABG)    Collection Time: 05/09/24  3:49 PM   Result Value Ref Range    Sample Type Arterial Blood     Sample site Arterial Line     Drawn by  RRT     pH, Blood gas 7.370 7.350 - 7.450    pCO2, Blood gas 35.0 35.0 - 45.0 mmHg    pO2, Blood gas 110.0 (H) 80.0 - 100.0 mmHg    Sodium, Blood Gas 138 137 - 145 mmol/L    Potassium, Blood Gas 3.4 (L) 3.5 - 5.0 mmol/L    Calcium Level Ionized 1.26 (H) 1.12 - 1.23 mmol/L    TOC2, Blood gas 21.3 mmol/L    Base Excess, Blood gas -4.50 (L) -2.00 - 2.00 mmol/L    sO2, Blood gas 98.2 %    HCO3, Blood gas 20.2 (L) 22.0 - 26.0 mmol/L    THb, Blood gas 9.5 (L) 12 - 16 g/dL    O2 Hb, Blood Gas 98.2 (H) 94.0 - 97.0 %    CO Hgb 2.5 (H) 0.5 - 1.5 %    Met Hgb 0.2 (L) 0.4 - 1.5 %    Allens Test N/A     MODE SIMV     FIO2, Blood gas 40.0 %    Mech Vt 500 ml    Mech RR 20 b/min    PEEP 5.0 cmH2O    PS 10.0 cmH2O   POCT glucose    Collection Time: 05/09/24  5:30 PM   Result Value Ref Range    POCT Glucose 157 (H) 70 - 110 mg/dL   Blood Gas (ABG)    Collection Time: 05/09/24  6:41 PM   Result Value Ref Range    Sample Type Arterial Blood     Sample site Arterial Line     Drawn by RF RRT     pH, Blood gas 7.320 (L) 7.350 - 7.450    pCO2, Blood gas 43.0 35.0 - 45.0 mmHg    pO2, Blood gas 111.0 (H) 80.0 - 100.0 mmHg    Sodium, Blood Gas 136 (L) 137 - 145 mmol/L    Potassium, Blood Gas 3.4 (L) 3.5 - 5.0 mmol/L    Calcium Level Ionized 1.12 1.12 - 1.23 mmol/L    TOC2, Blood gas 23.5 mmol/L    Base Excess, Blood gas -3.70 (L) -2.00 - 2.00 mmol/L    sO2, Blood gas 97.9 %    HCO3, Blood gas 22.2 22.0 - 26.0 mmol/L    THb, Blood gas 8.9 (L) 12 - 16 g/dL    O2 Hb, Blood Gas 96.9 94.0 - 97.0 %    CO Hgb 1.3 0.5 - 1.5 %    Met Hgb 0.8 0.4 - 1.5 %    Allens Test N/A     MODE CPAP     FIO2, Blood gas 30.0 %     CPAP 5 cm H2O    PS 10.0 cmH2O   Hemoglobin and Hematocrit    Collection Time: 05/09/24  6:58 PM   Result Value Ref Range    Hgb 8.6 (L) 12.0 - 16.0 g/dL    Hct 25.5 (L) 37.0 - 47.0 %   Potassium    Collection Time: 05/09/24  6:58 PM   Result Value Ref Range    Potassium 3.4 (L) 3.5 - 5.1 mmol/L   Magnesium    Collection Time: 05/09/24  6:58 PM   Result Value Ref Range    Magnesium Level 2.10 1.60 - 2.60 mg/dL   POCT glucose    Collection Time: 05/09/24  7:01 PM   Result Value Ref Range    POCT Glucose 221 (H) 70 - 110 mg/dL   MRSA PCR    Collection Time: 05/09/24  7:19 PM   Result Value Ref Range    MRSA PCR SCRN ([facility]) Not Detected Not Detected   POCT glucose    Collection Time: 05/09/24  8:21 PM   Result Value Ref Range    POCT Glucose 191 (H) 70 - 110 mg/dL   POCT glucose    Collection Time: 05/09/24  9:10 PM   Result Value Ref Range    POCT Glucose 180 (H) 70 - 110 mg/dL   POCT glucose    Collection Time: 05/09/24 10:05 PM   Result Value Ref Range    POCT Glucose 159 (H) 70 - 110 mg/dL   POCT glucose    Collection Time: 05/09/24 11:11 PM   Result Value Ref Range    POCT Glucose 138 (H) 70 - 110 mg/dL   POCT glucose    Collection Time: 05/10/24 12:09 AM   Result Value Ref Range    POCT Glucose 116 (H) 70 - 110 mg/dL   POCT glucose    Collection Time: 05/10/24  1:06 AM   Result Value Ref Range    POCT Glucose 108 70 - 110 mg/dL   POCT glucose    Collection Time: 05/10/24  2:05 AM   Result Value Ref Range    POCT Glucose 106 70 - 110 mg/dL   POCT glucose    Collection Time: 05/10/24  3:04 AM   Result Value Ref Range    POCT Glucose 113 (H) 70 - 110 mg/dL   CBC Without Differential    Collection Time: 05/10/24  3:08 AM   Result Value Ref Range    WBC 7.85 4.50 - 11.50 x10(3)/mcL    RBC 3.59 (L) 4.20 - 5.40 x10(6)/mcL    Hgb 10.4 (L) 12.0 - 16.0 g/dL    Hct 31.6 (L) 37.0 - 47.0 %    MCV 88.0 80.0 - 94.0 fL    MCH 29.0 27.0 - 31.0 pg    MCHC 32.9 (L) 33.0 - 36.0 g/dL    RDW 14.4 11.5 - 17.0 %    Platelet 98 (L) 130 -  400 x10(3)/mcL    MPV 10.8 (H) 7.4 - 10.4 fL    IPF 2.6 0.9 - 11.2 %    NRBC% 0.0 %   Comprehensive Metabolic Panel    Collection Time: 05/10/24  3:08 AM   Result Value Ref Range    Sodium 136 136 - 145 mmol/L    Potassium 4.0 3.5 - 5.1 mmol/L    Chloride 111 (H) 98 - 107 mmol/L    CO2 21 (L) 23 - 31 mmol/L    Glucose 111 82 - 115 mg/dL    Blood Urea Nitrogen 7.5 (L) 9.8 - 20.1 mg/dL    Creatinine 0.67 0.55 - 1.02 mg/dL    Calcium 8.0 (L) 8.4 - 10.2 mg/dL    Protein Total 4.7 (L) 5.8 - 7.6 gm/dL    Albumin 3.4 3.4 - 4.8 g/dL    Globulin 1.3 (L) 2.4 - 3.5 gm/dL    Albumin/Globulin Ratio 2.6 (H) 1.1 - 2.0 ratio    Bilirubin Total 1.0 <=1.5 mg/dL    ALP 36 (L) 40 - 150 unit/L    ALT 11 0 - 55 unit/L    AST 34 5 - 34 unit/L    eGFR >60 mL/min/1.73/m2   POCT glucose    Collection Time: 05/10/24  4:08 AM   Result Value Ref Range    POCT Glucose 111 (H) 70 - 110 mg/dL   POCT glucose    Collection Time: 05/10/24  5:04 AM   Result Value Ref Range    POCT Glucose 107 70 - 110 mg/dL   POCT glucose    Collection Time: 05/10/24  5:59 AM   Result Value Ref Range    POCT Glucose 138 (H) 70 - 110 mg/dL   POCT glucose    Collection Time: 05/10/24  7:46 AM   Result Value Ref Range    POCT Glucose 115 (H) 70 - 110 mg/dL   POCT glucose    Collection Time: 05/10/24 11:08 AM   Result Value Ref Range    POCT Glucose 106 70 - 110 mg/dL   POCT glucose    Collection Time: 05/10/24  8:07 PM   Result Value Ref Range    POCT Glucose 101 70 - 110 mg/dL   POCT glucose    Collection Time: 05/11/24 12:21 AM   Result Value Ref Range    POCT Glucose 128 (H) 70 - 110 mg/dL   EKG 12-lead    Collection Time: 05/11/24  4:05 AM   Result Value Ref Range    QRS Duration 70 ms    [facility] QTC Calculation 400 ms   POCT glucose    Collection Time: 05/11/24  4:35 AM   Result Value Ref Range    POCT Glucose 94 70 - 110 mg/dL   Comprehensive Metabolic Panel    Collection Time: 05/11/24  6:00 AM   Result Value Ref Range    Sodium 139 136 - 145 mmol/L    Potassium 4.8  3.5 - 5.1 mmol/L    Chloride 108 (H) 98 - 107 mmol/L    CO2 23 23 - 31 mmol/L    Glucose 108 82 - 115 mg/dL    Blood Urea Nitrogen 7.8 (L) 9.8 - 20.1 mg/dL    Creatinine 0.72 0.55 - 1.02 mg/dL    Calcium 9.3 8.4 - 10.2 mg/dL    Protein Total 5.6 (L) 5.8 - 7.6 gm/dL    Albumin 3.1 (L) 3.4 - 4.8 g/dL    Globulin 2.5 2.4 - 3.5 gm/dL    Albumin/Globulin Ratio 1.2 1.1 - 2.0 ratio    Bilirubin Total 1.2 <=1.5 mg/dL    ALP 55 40 - 150 unit/L    ALT 13 0 - 55 unit/L    AST 32 5 - 34 unit/L    eGFR >60 mL/min/1.73/m2   Magnesium    Collection Time: 05/11/24  6:00 AM   Result Value Ref Range    Magnesium Level 1.90 1.60 - 2.60 mg/dL   CBC with Differential    Collection Time: 05/11/24  6:00 AM   Result Value Ref Range    WBC 10.09 4.50 - 11.50 x10(3)/mcL    RBC 4.11 (L) 4.20 - 5.40 x10(6)/mcL    Hgb 12.0 12.0 - 16.0 g/dL    Hct 35.5 (L) 37.0 - 47.0 %    MCV 86.4 80.0 - 94.0 fL    MCH 29.2 27.0 - 31.0 pg    MCHC 33.8 33.0 - 36.0 g/dL    RDW 14.4 11.5 - 17.0 %    Platelet 98 (L) 130 - 400 x10(3)/mcL    MPV 11.5 (H) 7.4 - 10.4 fL    Neut % 80.2 %    Lymph % 11.3 %    Mono % 7.5 %    Eos % 0.4 %    Basophil % 0.2 %    Lymph # 1.14 0.6 - 4.6 x10(3)/mcL    Neut # 8.09 2.1 - 9.2 x10(3)/mcL    Mono # 0.76 0.1 - 1.3 x10(3)/mcL    Eos # 0.04 0 - 0.9 x10(3)/mcL    Baso # 0.02 <=0.2 x10(3)/mcL    IG# 0.04 0 - 0.04 x10(3)/mcL    IG% 0.4 %    NRBC% 0.0 %   CBC Without Differential    Collection Time: 05/12/24  4:36 AM   Result Value Ref Range    WBC 8.85 4.50 - 11.50 x10(3)/mcL    RBC 4.30 4.20 - 5.40 x10(6)/mcL    Hgb 12.5 12.0 - 16.0 g/dL    Hct 38.6 37.0 - 47.0 %    MCV 89.8 80.0 - 94.0 fL    MCH 29.1 27.0 - 31.0 pg    MCHC 32.4 (L) 33.0 - 36.0 g/dL    RDW 14.3 11.5 - 17.0 %    Platelet 108 (L) 130 - 400 x10(3)/mcL    MPV 11.0 (H) 7.4 - 10.4 fL    IPF 4.4 0.9 - 11.2 %    NRBC% 0.0 %   Comprehensive Metabolic Panel    Collection Time: 05/12/24  4:36 AM   Result Value Ref Range    Sodium 139 136 - 145 mmol/L    Potassium 4.3 3.5 - 5.1  mmol/L    Chloride 108 (H) 98 - 107 mmol/L    CO2 25 23 - 31 mmol/L    Glucose 92 82 - 115 mg/dL    Blood Urea Nitrogen 10.4 9.8 - 20.1 mg/dL    Creatinine 0.69 0.55 - 1.02 mg/dL    Calcium 9.2 8.4 - 10.2 mg/dL    Protein Total 5.6 (L) 5.8 - 7.6 gm/dL    Albumin 2.8 (L) 3.4 - 4.8 g/dL    Globulin 2.8 2.4 - 3.5 gm/dL    Albumin/Globulin Ratio 1.0 (L) 1.1 - 2.0 ratio    Bilirubin Total 1.0 <=1.5 mg/dL    ALP 55 40 - 150 unit/L    ALT 13 0 - 55 unit/L    AST 28 5 - 34 unit/L    eGFR >60 mL/min/1.73/m2   Magnesium    Collection Time: 05/12/24  4:36 AM   Result Value Ref Range    Magnesium Level 2.10 1.60 - 2.60 mg/dL   CBC with Differential    Collection Time: 05/12/24  4:36 AM   Result Value Ref Range    WBC 8.85 4.50 - 11.50 x10(3)/mcL    RBC 4.30 4.20 - 5.40 x10(6)/mcL    Hgb 12.5 12.0 - 16.0 g/dL    Hct 38.6 37.0 - 47.0 %    MCV 89.8 80.0 - 94.0 fL    MCH 29.1 27.0 - 31.0 pg    MCHC 32.4 (L) 33.0 - 36.0 g/dL    RDW 14.3 11.5 - 17.0 %    Platelet 108 (L) 130 - 400 x10(3)/mcL    MPV 11.0 (H) 7.4 - 10.4 fL    Neut % 73.1 %    Lymph % 15.8 %    Mono % 8.7 %    Eos % 1.6 %    Basophil % 0.5 %    Lymph # 1.40 0.6 - 4.6 x10(3)/mcL    Neut # 6.47 2.1 - 9.2 x10(3)/mcL    Mono # 0.77 0.1 - 1.3 x10(3)/mcL    Eos # 0.14 0 - 0.9 x10(3)/mcL    Baso # 0.04 <=0.2 x10(3)/mcL    IG# 0.03 0 - 0.04 x10(3)/mcL    IG% 0.3 %    NRBC% 0.0 %    IPF 4.4 0.9 - 11.2 %     Telemetry:     Physical Exam  Vitals reviewed.   Constitutional:       General: She is not in acute distress.     Appearance: Normal appearance. She is obese.   HENT:      Head: Normocephalic.      Mouth/Throat:      Mouth: Mucous membranes are moist.   Eyes:      Extraocular Movements: Extraocular movements intact.      Conjunctiva/sclera: Conjunctivae normal.   Cardiovascular:      Rate and Rhythm: Normal rate and regular rhythm.      Pulses: Normal pulses.      Heart sounds: Normal heart sounds. No murmur heard.  Pulmonary:      Effort: Pulmonary effort is normal. No  respiratory distress.      Breath sounds: Decreased breath sounds present.      Comments: NC O2  Abdominal:      Palpations: Abdomen is soft.   Skin:     General: Skin is warm.      Comments: R Wrist Soft/Flat, Non-Tender, No Sign of Bleed/Infection. +2 BLE Palpable Radial Pulses. Midline Sternotomy Dressing C/D/I. + CT    Neurological:      General: No focal deficit present.      Mental Status: She is alert and oriented to person, place, and time. Mental status is at baseline.   Psychiatric:         Behavior: Behavior normal.         Judgment: Judgment normal.       Current Inpatient Medications:  Current Facility-Administered Medications:     0.9%  NaCl infusion (for blood administration), , Intravenous, Q24H PRN, Lissy London, TYLER    acetaminophen tablet 650 mg, 650 mg, Oral, Q4H PRN, Cleso Watts Jr., MD, 650 mg at 05/07/24 1230    albumin human 5% bottle 12.5 g, 12.5 g, Intravenous, PRN, Anna Mitchell PA, Stopped at 05/10/24 0157    aspirin EC tablet 81 mg, 81 mg, Oral, Daily, Anna Mitchell PA, 81 mg at 05/12/24 0810    atorvastatin tablet 80 mg, 80 mg, Oral, QHS, Danuta Cheek NP, 80 mg at 05/11/24 2025    calcium gluconate 1 g in NS IVPB (premixed), 1 g, Intravenous, PRN, Anna Mitchell PA    calcium gluconate 1 g in NS IVPB (premixed), 2 g, Intravenous, PRN, Anna Mitchell PA    calcium gluconate 1 g in NS IVPB (premixed), 3 g, Intravenous, PRN, Anna Mitchell PA    cefUROXime sodium 1.5 g in dextrose 5 % in water (D5W) 100 mL IVPB (MB+), 1.5 g, Intravenous, On Call Procedure, Quentin Peterson MD, 1.5 g at 05/09/24 1142    dextrose 10% bolus 125 mL 125 mL, 12.5 g, Intravenous, PRN, Anna Mitchell PA    dextrose 10% bolus 250 mL 250 mL, 25 g, Intravenous, PRN, Anna Mitchell PA    docusate sodium capsule 100 mg, 100 mg, Oral, BID, Anna Mitchell PA, 100 mg at 05/12/24 0809    enoxaparin injection 40 mg, 40 mg, Subcutaneous, Daily, Lissy London FNP, 40 mg at 05/11/24  1740    famotidine tablet 20 mg, 20 mg, Oral, Daily, Nehemias Talamantes MD, 20 mg at 05/12/24 0809    folic acid tablet 1 mg, 1 mg, Oral, Daily, Anna Mitchell PA, 1 mg at 05/12/24 0809    hydrALAZINE injection 10 mg, 10 mg, Intravenous, Q4H PRN, Celso Watts Jr., MD    HYDROcodone-acetaminophen 5-325 mg per tablet 1 tablet, 1 tablet, Oral, Q4H PRN, Celso Watts Jr., MD, 1 tablet at 05/10/24 0821    HYDROcodone-acetaminophen 5-325 mg per tablet 1 tablet, 1 tablet, Oral, Q4H PRN, Anna Mitchell PA    lactulose 10 gram/15 ml solution 20 g, 20 g, Oral, Q6H PRN, Anna Mitchell PA    loperamide capsule 2 mg, 2 mg, Oral, Continuous PRN, Anna Mitchell PA    magnesium sulfate 2g in water 50mL IVPB (premix), 2 g, Intravenous, PRN, Anna Mitchell PA    magnesium sulfate 2g in water 50mL IVPB (premix), 4 g, Intravenous, PRN, Anna Mitchell PA    metoclopramide injection 5 mg, 5 mg, Intravenous, Q6H PRN, Anna Mitchell PA    metoprolol tartrate (LOPRESSOR) split tablet 12.5 mg, 12.5 mg, Oral, BID, Anna Mitchell PA, 12.5 mg at 05/12/24 0810    morphine injection 2 mg, 2 mg, Intravenous, Q4H PRN, Celso Watts Jr., MD    morphine injection 4 mg, 4 mg, Intravenous, Q4H PRN, Anna Mitchell PA, 4 mg at 05/09/24 1834    mupirocin 2 % ointment, , Nasal, On Call Procedure, Nehemias Talamantes MD    nitroGLYCERIN SL tablet 0.4 mg, 0.4 mg, Sublingual, Q5 Min PRN, Aubrey Davis III, MD    ondansetron injection 4 mg, 4 mg, Intravenous, Q8H PRN, Celso Watts Jr., MD    ondansetron injection 4 mg, 4 mg, Intravenous, Q4H PRN, Anna Mitchell PA, 4 mg at 05/10/24 0823    oxyCODONE immediate release tablet 5 mg, 5 mg, Oral, Q4H PRN, Nehemias Talamantes MD, 5 mg at 05/11/24 0655    oxyCODONE immediate release tablet Tab 10 mg, 10 mg, Oral, Q4H PRN, Anna Mitchell PA, 10 mg at 05/10/24 1640    sodium chloride 0.9% flush 10 mL, 10 mL, Intravenous, PRN, Aubrey Davis III, MD    sodium  chloride 0.9% flush 10 mL, 10 mL, Intravenous, PRN, Danuta Cheek NP    sucralfate tablet 1 g, 1 g, Oral, QID (AC & HS), Anna Mitchell PA, 1 g at 05/12/24 0645  VTE Risk Mitigation (From admission, onward)           Ordered     enoxaparin injection 40 mg  Daily         05/10/24 1227     IP VTE HIGH RISK PATIENT  Once         05/10/24 1227     Place MARISELA hose  Until discontinued         05/09/24 1455     Place sequential compression device  Until discontinued         05/05/24 1244                  Assessment:   MVCAD    - 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 to LAD, SV to RCA Streaker; LAAL    - LHC (5.6.24): Severe CAD; Mid LAD 60-70% Stenosis IFR 0.96, Ostial Left Circumflex 95%, Prox RCA 90%    - PET (5.2.24): Concern for Anterior ischemia    - ECHO (5.2.24) - LVEF 60%  Acute Respiratory Failure Requiring Intubation/Ventilation - Now on NC   Hypotension requiring Pressors - Resolved     - History of HTN  Unstable Angina - Resolved   HLD  GERD  Obesity   No Hx of GIB    Plan:   Continue ASA, BB and Statin   Increase Metoprolol Tartrate to 25mg PO BID   Aggressive Mobilization of PT and Q1HR IS  Labs in AM: CBC, CMP and Mg     YELENA Salinas  Cardiology  [facility]  05/12/2024    I agree with the findings of the complexity of problems addressed and take responsibility for the plan's risks and complications. I approved the plan documented by Brandon Rodriguez NP.  Increase lopressor

## 2024-05-12 NOTE — PROGRESS NOTES
CT SURGERY PROGRESS NOTE  Tanvi Miranda  68 y.o.  1955    Patients Procedure: Procedure(s) (LRB):  CORONARY ARTERY BYPASS GRAFT (CABG) (N/A)  SURGICAL PROCUREMENT, VEIN, ENDOSCOPIC (N/A)  Left atrial appendage closure device (N/A)    Subjective  Interval History: Patient is postoperative day 3 from CABG x 2 with EVH and ligation of left atrial appendage.          Medication List  Infusions   loperamide  2 mg Oral Continuous PRN         Scheduled   aspirin  81 mg Oral Daily    atorvastatin  80 mg Oral QHS    docusate sodium  100 mg Oral BID    enoxparin  40 mg Subcutaneous Daily    famotidine  20 mg Oral Daily    folic acid  1 mg Oral Daily    metoprolol tartrate  12.5 mg Oral BID    sucralfate  1 g Oral QID (AC & HS)       Objective:  Recent Vitals:  Temp:  [98 °F (36.7 °C)-99.1 °F (37.3 °C)] 98.1 °F (36.7 °C)  Pulse:  [] 99  Resp:  [20-35] 26  SpO2:  [90 %-97 %] 95 %  BP: (102-133)/(65-91) 109/65    Physical Exam  Constitutional:       General: She is not in acute distress.     Appearance: She is not ill-appearing.   HENT:      Head: Normocephalic and atraumatic.      Mouth/Throat:      Mouth: Mucous membranes are moist.      Pharynx: Oropharynx is clear.   Cardiovascular:      Rate and Rhythm: Normal rate and regular rhythm.      Pulses: Normal pulses.      Heart sounds: Normal heart sounds. No murmur heard.     No friction rub. No gallop.   Pulmonary:      Effort: Pulmonary effort is normal. No respiratory distress.      Breath sounds: Normal breath sounds. No wheezing, rhonchi or rales.   Abdominal:      General: There is no distension.      Palpations: Abdomen is soft.   Musculoskeletal:         General: Normal range of motion.      Cervical back: Normal range of motion and neck supple.      Right lower leg: No edema.      Left lower leg: No edema.   Skin:     General: Skin is warm and dry.      Comments: Median sternotomy incision dressed - c/d/I  Ct x 2 in place.  Dressed. C/d/i   Neurological:       General: No focal deficit present.      Mental Status: She is alert and oriented to person, place, and time.   Psychiatric:         Mood and Affect: Mood normal.         Behavior: Behavior normal.          I/O last 24 hrs:  Intake/Output - Last 3 Shifts         05/10 0700 05/11 0659 05/11 0700 05/12 0659 05/12 0700 05/13 0659    P.O. 240      I.V. (mL/kg) 571.6 (6.9)      Blood       IV Piggyback 50      Total Intake(mL/kg) 861.6 (10.3)      Urine (mL/kg/hr) 1450 (0.7) 1000 (0.5)     Stool       Chest Tube 490 60     Total Output 1940 1060     Net -1078.4 -1060                    Labs  CBC:   Recent Labs   Lab 05/12/24  0436   WBC 8.85  8.85   RBC 4.30  4.30   HGB 12.5  12.5   HCT 38.6  38.6   *  108*   MCV 89.8  89.8   MCH 29.1  29.1   MCHC 32.4*  32.4*     CMP:   Recent Labs   Lab 05/12/24  0436   CALCIUM 9.2   ALBUMIN 2.8*      K 4.3   CO2 25   BUN 10.4   CREATININE 0.69   ALKPHOS 55   ALT 13   AST 28   BILITOT 1.0     LFTs:   Recent Labs   Lab 05/12/24  0436   ALT 13   AST 28   ALKPHOS 55   BILITOT 1.0   ALBUMIN 2.8*     All pertinent labs from the last 24 hours have been reviewed.      Imaging:   CXR: X-Ray Chest AP Portable    Result Date: 5/10/2024  1. Interval removal of endotracheal and enteric tubes. 2. Bibasilar subsegmental atelectasis. Electronically signed by: Neda Cuevas Date:    05/10/2024 Time:    05:56    X-Ray Chest AP Portable    Result Date: 5/9/2024  Postoperative changes Electronically signed by: Garth Dixon Date:    05/09/2024 Time:    15:35   I have reviewed all pertinent imaging results/findings within the past 24 hours.        ASSESSMENT/PLAN:    Multivessel coronary artery disease  HTN  HLD  Significant family history of early CAD    -s/p CABG x 2 with EVH and LLAA  -L CT in - DC soon   -CXR with bibasilar subsegmental atelectasis.  -OOB.  Mobilize.  Aggressive IS use  -OK to downgrade to telemetry - waiting on bed    Case and plan of care discussed  with MD Celso Carlson PAC

## 2024-05-12 NOTE — PROGRESS NOTES
05/12/24 1355   Pre Exercise Vitals   /82   Pulse 108   Supplemental O2? No   SpO2 93 %   During Exercise Vitals   Pulse 116   Supplemental O2? No   Distance Walked 50 feet   Post Exercise Vitals   /78   Pulse 102   Supplemental O2? No   SpO2 95 %   Modality   Modality   (rollator)     Communicated with nurse prior to and post activity.   Up in chair pre and post activity.   Mod assist x1 sit to stand maintaining sternal precautions.   Gait very slow due to pre-existing right knee issues, awaiting knee brace from home.  Encouraged increased activity and use of IS.

## 2024-05-12 NOTE — PT/OT/SLP EVAL
Physical Therapy Evaluation    Patient Name:  Tanvi Miranda   MRN:  [id number]    Recommendations:     Discharge therapy intensity: Low Intensity Therapy   Discharge Equipment Recommendations: walker, rolling   Barriers to discharge: Impaired mobility and Ongoing medical needs    Assessment:     Tanvi Miranda is a 68 y.o. female admitted with a medical diagnosis of Severe coronary artery disease, s/p CABG x 2 with EVH and ligation of left atrial appendage 5/9/24.  She presents with the following impairments/functional limitations: weakness, impaired endurance, impaired functional mobility, decreased coordination.    Pt feeling good this AM and agreeable to work with PT. She was up in chair upon PT entry to room. CGA all mobility, ambulated 60' w/ RW at very slow gait speed and apprehensive pattern. Pt returned to chair at end of evaluation with all needs in reach and  in room.     Rehab Prognosis: Good; patient would benefit from acute skilled PT services to address these deficits and reach maximum level of function.    Recent Surgery: Procedure(s) (LRB):  CORONARY ARTERY BYPASS GRAFT (CABG) (N/A)  SURGICAL PROCUREMENT, VEIN, ENDOSCOPIC (N/A)  Left atrial appendage closure device (N/A) 3 Days Post-Op    Plan:     During this hospitalization, patient would benefit from acute PT services 5 x/week to address the identified rehab impairments via gait training, therapeutic activities, therapeutic exercises and progress toward the following goals:    Plan of Care Expires:  06/12/24    Subjective     Chief Complaint: R knee pain  Patient/Family Comments/goals: return to PLOF  Pain/Comfort:  Location - Side 1: Right  Location 1: knee    Patients cultural, spiritual, Yazidi conflicts given the current situation: no    Living Environment:  Pt lives with her  in a mobile home with 5 [address]AK[place] and railing.   Prior to admission, patients level of function was independent, ambulating no AD.  Equipment used at  home: none.  DME owned (not currently used): none.  Upon discharge, patient will have assistance from .    Objective:     Communicated with nurse prior to session.  Patient found up in chair with telemetry, pulse ox (continuous), blood pressure cuff  upon PT entry to room.    General Precautions: Standard, fall, sternal  Orthopedic Precautions:N/A   Braces: N/A  Respiratory Status: Room air  Blood Pressure: 109/70      Exams:  RLE ROM: WNL  RLE Strength: 4/5 grossly  LLE ROM: WNL  LLE Strength: 4/5 grossly  Skin integrity: Visible skin intact      Functional Mobility:  Transfers:     Sit to Stand:  contact guard assistance with rolling walker  Gait: 60' RW CGA      AM-PAC 6 CLICK MOBILITY  Total Score:20       Treatment & Education:  Education Provided:  Role and goals of PT, transfer training, bed mobility, gait training, balance training, safety awareness, assistive device, strengthening exercises, and importance of participating in PT to return to PLOF.        Patient left up in chair with all lines intact, call button in reach, and  present.    GOALS:   Multidisciplinary Problems       Physical Therapy Goals          Problem: Physical Therapy    Goal Priority Disciplines Outcome Goal Variances Interventions   Physical Therapy Goal     PT, PT/OT Progressing     Description: Goals to be met by: 24     Patient will increase functional independence with mobility by performin. Supine to sit with Stand-by Assistance  2. Sit to stand transfer with Supervision  3. Gait  x 200 feet with Supervision using Rolling Walker.                          History:     History reviewed. No pertinent past medical history.    Past Surgical History:   Procedure Laterality Date    CLOSURE OF LEFT ATRIAL APPENDAGE USING DEVICE N/A 2024    Procedure: Left atrial appendage closure device;  Surgeon: Nehemias Talamantes MD;  Location: [facility];  Service: Cardiology;  Laterality: N/A;    CORONARY ARTERY BYPASS GRAFT  (CABG) N/A 5/9/2024    Procedure: CORONARY ARTERY BYPASS GRAFT (CABG);  Surgeon: Nehemias Talamantes MD;  Location: [facility] OR;  Service: Cardiothoracic;  Laterality: N/A;  ECHO NOTIFIED    ENDOSCOPIC HARVEST OF VEIN N/A 5/9/2024    Procedure: SURGICAL PROCUREMENT, VEIN, ENDOSCOPIC;  Surgeon: Nehemias Talamantes MD;  Location: [facility] OR;  Service: Cardiothoracic;  Laterality: N/A;    LEFT HEART CATHETERIZATION Left 5/6/2024    Procedure: Left heart cath;  Surgeon: Celos Watts Jr., MD;  Location: [facility] CATH LAB;  Service: Cardiology;  Laterality: Left;       Time Tracking:     PT Received On: 05/12/24  PT Start Time: 0926     PT Stop Time: 0943  PT Total Time (min): 17 min     Billable Minutes: Evaluation 17      05/12/2024

## 2024-05-13 LAB
ALBUMIN SERPL-MCNC: 2.5 G/DL (ref 3.4–4.8)
ALBUMIN/GLOB SERPL: 1 RATIO (ref 1.1–2)
ALP SERPL-CCNC: 60 UNIT/L (ref 40–150)
ALT SERPL-CCNC: 14 UNIT/L (ref 0–55)
AST SERPL-CCNC: 26 UNIT/L (ref 5–34)
BASOPHILS # BLD AUTO: 0.03 X10(3)/MCL
BASOPHILS NFR BLD AUTO: 0.5 %
BILIRUB SERPL-MCNC: 0.7 MG/DL
BUN SERPL-MCNC: 10.7 MG/DL (ref 9.8–20.1)
CALCIUM SERPL-MCNC: 8.9 MG/DL (ref 8.4–10.2)
CHLORIDE SERPL-SCNC: 109 MMOL/L (ref 98–107)
CO2 SERPL-SCNC: 27 MMOL/L (ref 23–31)
CREAT SERPL-MCNC: 0.69 MG/DL (ref 0.55–1.02)
EOSINOPHIL # BLD AUTO: 0.21 X10(3)/MCL (ref 0–0.9)
EOSINOPHIL NFR BLD AUTO: 3.7 %
ERYTHROCYTE [DISTWIDTH] IN BLOOD BY AUTOMATED COUNT: 14.1 % (ref 11.5–17)
GFR SERPLBLD CREATININE-BSD FMLA CKD-EPI: >60 ML/MIN/1.73/M2
GLOBULIN SER-MCNC: 2.6 GM/DL (ref 2.4–3.5)
GLUCOSE SERPL-MCNC: 99 MG/DL (ref 82–115)
HCT VFR BLD AUTO: 33.7 % (ref 37–47)
HGB BLD-MCNC: 11 G/DL (ref 12–16)
IMM GRANULOCYTES # BLD AUTO: 0.02 X10(3)/MCL (ref 0–0.04)
IMM GRANULOCYTES NFR BLD AUTO: 0.4 %
LYMPHOCYTES # BLD AUTO: 1.08 X10(3)/MCL (ref 0.6–4.6)
LYMPHOCYTES NFR BLD AUTO: 19.2 %
MAGNESIUM SERPL-MCNC: 2 MG/DL (ref 1.6–2.6)
MCH RBC QN AUTO: 28.9 PG (ref 27–31)
MCHC RBC AUTO-ENTMCNC: 32.6 G/DL (ref 33–36)
MCV RBC AUTO: 88.7 FL (ref 80–94)
MONOCYTES # BLD AUTO: 0.52 X10(3)/MCL (ref 0.1–1.3)
MONOCYTES NFR BLD AUTO: 9.3 %
NEUTROPHILS # BLD AUTO: 3.76 X10(3)/MCL (ref 2.1–9.2)
NEUTROPHILS NFR BLD AUTO: 66.9 %
NRBC BLD AUTO-RTO: 0 %
PLATELET # BLD AUTO: 142 X10(3)/MCL (ref 130–400)
PMV BLD AUTO: 11.3 FL (ref 7.4–10.4)
POTASSIUM SERPL-SCNC: 3.8 MMOL/L (ref 3.5–5.1)
PROT SERPL-MCNC: 5.1 GM/DL (ref 5.8–7.6)
RBC # BLD AUTO: 3.8 X10(6)/MCL (ref 4.2–5.4)
SODIUM SERPL-SCNC: 142 MMOL/L (ref 136–145)
WBC # SPEC AUTO: 5.62 X10(3)/MCL (ref 4.5–11.5)

## 2024-05-13 PROCEDURE — 63600175 PHARM REV CODE 636 W HCPCS

## 2024-05-13 PROCEDURE — 25000003 PHARM REV CODE 250: Performed by: PHYSICIAN ASSISTANT

## 2024-05-13 PROCEDURE — 25000003 PHARM REV CODE 250: Performed by: NURSE PRACTITIONER

## 2024-05-13 PROCEDURE — 25000003 PHARM REV CODE 250: Performed by: THORACIC SURGERY (CARDIOTHORACIC VASCULAR SURGERY)

## 2024-05-13 PROCEDURE — 94760 N-INVAS EAR/PLS OXIMETRY 1: CPT

## 2024-05-13 PROCEDURE — 97110 THERAPEUTIC EXERCISES: CPT

## 2024-05-13 PROCEDURE — 85025 COMPLETE CBC W/AUTO DIFF WBC: CPT | Performed by: NURSE PRACTITIONER

## 2024-05-13 PROCEDURE — 99024 POSTOP FOLLOW-UP VISIT: CPT | Mod: ,,, | Performed by: PHYSICIAN ASSISTANT

## 2024-05-13 PROCEDURE — 80053 COMPREHEN METABOLIC PANEL: CPT | Performed by: NURSE PRACTITIONER

## 2024-05-13 PROCEDURE — 97166 OT EVAL MOD COMPLEX 45 MIN: CPT

## 2024-05-13 PROCEDURE — 83735 ASSAY OF MAGNESIUM: CPT | Performed by: NURSE PRACTITIONER

## 2024-05-13 PROCEDURE — 97116 GAIT TRAINING THERAPY: CPT

## 2024-05-13 PROCEDURE — 21400001 HC TELEMETRY ROOM

## 2024-05-13 PROCEDURE — 36415 COLL VENOUS BLD VENIPUNCTURE: CPT | Performed by: NURSE PRACTITIONER

## 2024-05-13 PROCEDURE — 97530 THERAPEUTIC ACTIVITIES: CPT

## 2024-05-13 RX ADMIN — ENOXAPARIN SODIUM 40 MG: 40 INJECTION SUBCUTANEOUS at 04:05

## 2024-05-13 RX ADMIN — DOCUSATE SODIUM 100 MG: 100 CAPSULE, LIQUID FILLED ORAL at 08:05

## 2024-05-13 RX ADMIN — FAMOTIDINE 20 MG: 20 TABLET, FILM COATED ORAL at 08:05

## 2024-05-13 RX ADMIN — FOLIC ACID 1 MG: 1 TABLET ORAL at 08:05

## 2024-05-13 RX ADMIN — OXYCODONE HYDROCHLORIDE 5 MG: 5 TABLET ORAL at 08:05

## 2024-05-13 RX ADMIN — SUCRALFATE 1 G: 1 TABLET ORAL at 06:05

## 2024-05-13 RX ADMIN — SUCRALFATE 1 G: 1 TABLET ORAL at 11:05

## 2024-05-13 RX ADMIN — METOPROLOL TARTRATE 25 MG: 25 TABLET, FILM COATED ORAL at 08:05

## 2024-05-13 RX ADMIN — ATORVASTATIN CALCIUM 80 MG: 40 TABLET, FILM COATED ORAL at 08:05

## 2024-05-13 RX ADMIN — ASPIRIN 81 MG: 81 TABLET, COATED ORAL at 08:05

## 2024-05-13 NOTE — PROGRESS NOTES
"[facility]    Cardiology  Progress Note    Patient Name: Tanvi Miranda  MRN: [id number]  Admission Date: 5/5/2024  Hospital Length of Stay: 8 days  Code Status: Full Code   Attending Physician: Nehemias Talamantes MD   Primary Care Physician: Rupinder, Primary Doctor  Expected Discharge Date:   Principal Problem:<principal problem not specified>    Subjective:     Brief HPI/Hospital Course: 68-year-old female that is known to Dr. Doe with a PMHx of HLD, HTN, GERD and family Hx of early CAD. She presented to [facility] ED on 5.5.24 with complaints of left sided chest pain. Patient recently had a VAN/PET and ECHO completed by Dr. Doe and was planned to follow up 5.9.24 for results. She reports associated SOB and fatugue with exertion. She also report radiating left arm pain. Her last [facility] was back in 2018. ED Course: HR 66, /84, RR 20, Temp 98.9F SpO2 100% on RA. Lab work showed: WBC 3.98, K 4.1, BUN/Cr 10.3/0.78, Ca 10.1, AST/ALT 28/23, BNP 12.7, Trop: <0.010 X2, EKG shows no acute ST changes. [facility] will admit for chest pain.      5.7.24:  NAD. VSS. No complaints of CP/SOB/Palps. "I feel okay"  5.8.24: NAD. VSS. No complaints of CP/SOB/Palps. "I feel okay" CV surgery planning CABG for tomorrow. Resting comfortably in bed.  5.9.24: NAD. Intubated/Sedated. VSS. On Epi and Precedex   5.10.24: NAD. Sitting in Bedside Chair. Denies SOB and Palps. + CP/Incisional. Insulin Drip per CV[facility]. "I am ok, just some pain."   5.11.24: NAD. "I am feeling well." Denies SOB and Palps. + CP/Incisional.   5.12.24: NAD. "I am ok." Sitting in Bedside Chair. Denies SOB and Palps. + CP/Incisional.   5.13.24: NAD. Sitting in Bedside Chair. Denies SOB and Palps. + CP/Incisional.     PMH: HLD, HTN, GERD and family Hx of early CAD  PSH: Colonoscopy, tonsillectomy, Hysterectomy   Family History: Mother: CAD s/p PCI, Alzheimers, PAD, Brother & Sister: CAD  Social History: Denies Tobacco, illicit drug, or ETOH use.      Previous Cardiac " Diagnostics:   LHC (5.6.24):  There is significant coronary artery disease. Mid Left Anterior Descending has a 60-70% stenosis. Instantaneous wave-free ratio (iFR) was performed on the lesion, and found to be non-hemodynamically significant at 0.96. 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 Ostial Left Circumflex has a 95% stenosis. The distal vessel is supplied via left-to-right collaterals from the LAD (septal perforating branches). Prox Right Coronary Artery has a calcified 90% stenosis. The vessel is moderately tortuous. The distal vessel is supplied via left-to-right collaterals from the LAD (septal perforating branches). LVEDP is normal at 13 mmHg.    Bhumika PET (5.2.24):  The left ventricular cavity is noted to be normal on the stress studies. The stress left ventricular ejection fraction was calculated to be 63% and left ventricular global function is normal. The rest left ventricular cavity is noted to be normal. The rest left ventricular ejection fraction was calculated to be 50% and rest left ventricular global function is normal.   When compared to the resting ejection fraction (50%), the stress ejection fraction (63%) has increased.   There was a rise in myocardial blood flow between rest and stress.  Global myocardial blood flow reserve was 2.88.  Myocardial blood flow reserve is reduced in the inferior territory which is suggestive of flow limiting stenosis in this territory.  *FINAL READ NOT DONE*     ECHO (5.2.24):  The study quality is average.   The left ventricle is normal in size. Global left ventricular systolic function is normal. The left ventricular ejection fraction is 60%.   Normal appearing valvular structures and function are within study limits.   The pulmonary artery systolic pressure is 10 mmHg. The pulmonary artery appears to be normal.     LHC ( 8.3.18):  LAD: arrises right from the aorta there is no LM. Significant calcification present in LAD. There is a 40-50%  stenosis in the midpoint of the LAD. D2 is moderate in size with 20-30% stenosis   RCA: large prominent vessel. It is dominant. 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, Shows a long tubular  50% stenosis proximally with heavy calcification,  No obstructive disease was found   PLAN: Medical management     Review of Systems   Constitutional: Negative for malaise/fatigue.   Cardiovascular:  Positive for chest pain (Incisional). Negative for irregular heartbeat and palpitations.   Respiratory:  Negative for shortness of breath.    All other systems reviewed and are negative.    Objective:     Vital Signs (Most Recent):  Temp: 98.8 °F (37.1 °C) (05/13/24 0400)  Pulse: 82 (05/13/24 0600)  Resp: 19 (05/13/24 0600)  BP: 114/73 (05/13/24 0600)  SpO2: 97 % (05/13/24 0600) Vital Signs (24h Range):  Temp:  [98.3 °F (36.8 °C)-98.8 °F (37.1 °C)] 98.8 °F (37.1 °C)  Pulse:  [] 82  Resp:  [19-36] 19  SpO2:  [86 %-100 %] 97 %  BP: ()/(70-96) 114/73   Weight: 82.6 kg (182 lb 1.6 oz)  Body mass index is 32.26 kg/m².  SpO2: 97 %       Intake/Output Summary (Last 24 hours) at 5/13/2024 0925  Last data filed at 5/12/2024 1846  Gross per 24 hour   Intake 520 ml   Output --   Net 520 ml     Lines/Drains/Airways       Peripheral Intravenous Line  Duration                  Peripheral IV - Single Lumen 05/05/24 1201 20 G Anterior;Proximal;Right Forearm 7 days                  Significant Labs:   Recent Results (from the past 72 hour(s))   POCT glucose    Collection Time: 05/10/24 11:08 AM   Result Value Ref Range    POCT Glucose 106 70 - 110 mg/dL   POCT glucose    Collection Time: 05/10/24  8:07 PM   Result Value Ref Range    POCT Glucose 101 70 - 110 mg/dL   POCT glucose    Collection Time: 05/11/24 12:21 AM   Result Value Ref Range    POCT Glucose 128 (H) 70 - 110 mg/dL   EKG 12-lead     Collection Time: 05/11/24  4:05 AM   Result Value Ref Range    QRS Duration 70 ms    [facility] QTC Calculation 400 ms   POCT glucose    Collection Time: 05/11/24  4:35 AM   Result Value Ref Range    POCT Glucose 94 70 - 110 mg/dL   Comprehensive Metabolic Panel    Collection Time: 05/11/24  6:00 AM   Result Value Ref Range    Sodium 139 136 - 145 mmol/L    Potassium 4.8 3.5 - 5.1 mmol/L    Chloride 108 (H) 98 - 107 mmol/L    CO2 23 23 - 31 mmol/L    Glucose 108 82 - 115 mg/dL    Blood Urea Nitrogen 7.8 (L) 9.8 - 20.1 mg/dL    Creatinine 0.72 0.55 - 1.02 mg/dL    Calcium 9.3 8.4 - 10.2 mg/dL    Protein Total 5.6 (L) 5.8 - 7.6 gm/dL    Albumin 3.1 (L) 3.4 - 4.8 g/dL    Globulin 2.5 2.4 - 3.5 gm/dL    Albumin/Globulin Ratio 1.2 1.1 - 2.0 ratio    Bilirubin Total 1.2 <=1.5 mg/dL    ALP 55 40 - 150 unit/L    ALT 13 0 - 55 unit/L    AST 32 5 - 34 unit/L    eGFR >60 mL/min/1.73/m2   Magnesium    Collection Time: 05/11/24  6:00 AM   Result Value Ref Range    Magnesium Level 1.90 1.60 - 2.60 mg/dL   CBC with Differential    Collection Time: 05/11/24  6:00 AM   Result Value Ref Range    WBC 10.09 4.50 - 11.50 x10(3)/mcL    RBC 4.11 (L) 4.20 - 5.40 x10(6)/mcL    Hgb 12.0 12.0 - 16.0 g/dL    Hct 35.5 (L) 37.0 - 47.0 %    MCV 86.4 80.0 - 94.0 fL    MCH 29.2 27.0 - 31.0 pg    MCHC 33.8 33.0 - 36.0 g/dL    RDW 14.4 11.5 - 17.0 %    Platelet 98 (L) 130 - 400 x10(3)/mcL    MPV 11.5 (H) 7.4 - 10.4 fL    Neut % 80.2 %    Lymph % 11.3 %    Mono % 7.5 %    Eos % 0.4 %    Basophil % 0.2 %    Lymph # 1.14 0.6 - 4.6 x10(3)/mcL    Neut # 8.09 2.1 - 9.2 x10(3)/mcL    Mono # 0.76 0.1 - 1.3 x10(3)/mcL    Eos # 0.04 0 - 0.9 x10(3)/mcL    Baso # 0.02 <=0.2 x10(3)/mcL    IG# 0.04 0 - 0.04 x10(3)/mcL    IG% 0.4 %    NRBC% 0.0 %   CBC Without Differential    Collection Time: 05/12/24  4:36 AM   Result Value Ref Range    WBC 8.85 4.50 - 11.50 x10(3)/mcL    RBC 4.30 4.20 - 5.40 x10(6)/mcL    Hgb 12.5 12.0 - 16.0 g/dL    Hct 38.6 37.0 - 47.0 %    MCV 89.8  80.0 - 94.0 fL    MCH 29.1 27.0 - 31.0 pg    MCHC 32.4 (L) 33.0 - 36.0 g/dL    RDW 14.3 11.5 - 17.0 %    Platelet 108 (L) 130 - 400 x10(3)/mcL    MPV 11.0 (H) 7.4 - 10.4 fL    IPF 4.4 0.9 - 11.2 %    NRBC% 0.0 %   Comprehensive Metabolic Panel    Collection Time: 05/12/24  4:36 AM   Result Value Ref Range    Sodium 139 136 - 145 mmol/L    Potassium 4.3 3.5 - 5.1 mmol/L    Chloride 108 (H) 98 - 107 mmol/L    CO2 25 23 - 31 mmol/L    Glucose 92 82 - 115 mg/dL    Blood Urea Nitrogen 10.4 9.8 - 20.1 mg/dL    Creatinine 0.69 0.55 - 1.02 mg/dL    Calcium 9.2 8.4 - 10.2 mg/dL    Protein Total 5.6 (L) 5.8 - 7.6 gm/dL    Albumin 2.8 (L) 3.4 - 4.8 g/dL    Globulin 2.8 2.4 - 3.5 gm/dL    Albumin/Globulin Ratio 1.0 (L) 1.1 - 2.0 ratio    Bilirubin Total 1.0 <=1.5 mg/dL    ALP 55 40 - 150 unit/L    ALT 13 0 - 55 unit/L    AST 28 5 - 34 unit/L    eGFR >60 mL/min/1.73/m2   Magnesium    Collection Time: 05/12/24  4:36 AM   Result Value Ref Range    Magnesium Level 2.10 1.60 - 2.60 mg/dL   CBC with Differential    Collection Time: 05/12/24  4:36 AM   Result Value Ref Range    WBC 8.85 4.50 - 11.50 x10(3)/mcL    RBC 4.30 4.20 - 5.40 x10(6)/mcL    Hgb 12.5 12.0 - 16.0 g/dL    Hct 38.6 37.0 - 47.0 %    MCV 89.8 80.0 - 94.0 fL    MCH 29.1 27.0 - 31.0 pg    MCHC 32.4 (L) 33.0 - 36.0 g/dL    RDW 14.3 11.5 - 17.0 %    Platelet 108 (L) 130 - 400 x10(3)/mcL    MPV 11.0 (H) 7.4 - 10.4 fL    Neut % 73.1 %    Lymph % 15.8 %    Mono % 8.7 %    Eos % 1.6 %    Basophil % 0.5 %    Lymph # 1.40 0.6 - 4.6 x10(3)/mcL    Neut # 6.47 2.1 - 9.2 x10(3)/mcL    Mono # 0.77 0.1 - 1.3 x10(3)/mcL    Eos # 0.14 0 - 0.9 x10(3)/mcL    Baso # 0.04 <=0.2 x10(3)/mcL    IG# 0.03 0 - 0.04 x10(3)/mcL    IG% 0.3 %    NRBC% 0.0 %    IPF 4.4 0.9 - 11.2 %   Comprehensive Metabolic Panel    Collection Time: 05/13/24  4:08 AM   Result Value Ref Range    Sodium 142 136 - 145 mmol/L    Potassium 3.8 3.5 - 5.1 mmol/L    Chloride 109 (H) 98 - 107 mmol/L    CO2 27 23 - 31 mmol/L     Glucose 99 82 - 115 mg/dL    Blood Urea Nitrogen 10.7 9.8 - 20.1 mg/dL    Creatinine 0.69 0.55 - 1.02 mg/dL    Calcium 8.9 8.4 - 10.2 mg/dL    Protein Total 5.1 (L) 5.8 - 7.6 gm/dL    Albumin 2.5 (L) 3.4 - 4.8 g/dL    Globulin 2.6 2.4 - 3.5 gm/dL    Albumin/Globulin Ratio 1.0 (L) 1.1 - 2.0 ratio    Bilirubin Total 0.7 <=1.5 mg/dL    ALP 60 40 - 150 unit/L    ALT 14 0 - 55 unit/L    AST 26 5 - 34 unit/L    eGFR >60 mL/min/1.73/m2   Magnesium    Collection Time: 05/13/24  4:08 AM   Result Value Ref Range    Magnesium Level 2.00 1.60 - 2.60 mg/dL   CBC with Differential    Collection Time: 05/13/24  4:08 AM   Result Value Ref Range    WBC 5.62 4.50 - 11.50 x10(3)/mcL    RBC 3.80 (L) 4.20 - 5.40 x10(6)/mcL    Hgb 11.0 (L) 12.0 - 16.0 g/dL    Hct 33.7 (L) 37.0 - 47.0 %    MCV 88.7 80.0 - 94.0 fL    MCH 28.9 27.0 - 31.0 pg    MCHC 32.6 (L) 33.0 - 36.0 g/dL    RDW 14.1 11.5 - 17.0 %    Platelet 142 130 - 400 x10(3)/mcL    MPV 11.3 (H) 7.4 - 10.4 fL    Neut % 66.9 %    Lymph % 19.2 %    Mono % 9.3 %    Eos % 3.7 %    Basophil % 0.5 %    Lymph # 1.08 0.6 - 4.6 x10(3)/mcL    Neut # 3.76 2.1 - 9.2 x10(3)/mcL    Mono # 0.52 0.1 - 1.3 x10(3)/mcL    Eos # 0.21 0 - 0.9 x10(3)/mcL    Baso # 0.03 <=0.2 x10(3)/mcL    IG# 0.02 0 - 0.04 x10(3)/mcL    IG% 0.4 %    NRBC% 0.0 %     Telemetry: SR    Physical Exam  Vitals reviewed.   Constitutional:       General: She is not in acute distress.     Appearance: Normal appearance. She is obese.   HENT:      Head: Normocephalic.      Mouth/Throat:      Mouth: Mucous membranes are moist.   Eyes:      Extraocular Movements: Extraocular movements intact.      Conjunctiva/sclera: Conjunctivae normal.   Cardiovascular:      Rate and Rhythm: Normal rate and regular rhythm.      Pulses: Normal pulses.      Heart sounds: Normal heart sounds. No murmur heard.  Pulmonary:      Effort: Pulmonary effort is normal. No respiratory distress.      Breath sounds: Decreased breath sounds present.       Comments: RA  Abdominal:      Palpations: Abdomen is soft.   Skin:     General: Skin is warm.      Comments: R Wrist Soft/Flat, Non-Tender, No Sign of Bleed/Infection. +2 BLE Palpable Radial Pulses. Midline Sternotomy Dressing C/D/I   Neurological:      General: No focal deficit present.      Mental Status: She is alert and oriented to person, place, and time. Mental status is at baseline.   Psychiatric:         Behavior: Behavior normal.         Judgment: Judgment normal.       Current Inpatient Medications:  Current Facility-Administered Medications:     0.9%  NaCl infusion (for blood administration), , Intravenous, Q24H PRN, Lissy London, TYLER    acetaminophen tablet 650 mg, 650 mg, Oral, Q4H PRN, Celso Watts Jr., MD, 650 mg at 05/07/24 1230    albumin human 5% bottle 12.5 g, 12.5 g, Intravenous, PRN, Anna Mitchell PA, Stopped at 05/10/24 0157    aspirin EC tablet 81 mg, 81 mg, Oral, Daily, Anna Mitchell PA, 81 mg at 05/13/24 0821    atorvastatin tablet 80 mg, 80 mg, Oral, QHS, Danuta Cheek NP, 80 mg at 05/12/24 2010    calcium gluconate 1 g in NS IVPB (premixed), 1 g, Intravenous, PRN, Anna Mitchell PA    calcium gluconate 1 g in NS IVPB (premixed), 2 g, Intravenous, PRN, Anna Mitchell PA    calcium gluconate 1 g in NS IVPB (premixed), 3 g, Intravenous, PRN, Anna Mitchell PA    cefUROXime sodium 1.5 g in dextrose 5 % in water (D5W) 100 mL IVPB (MB+), 1.5 g, Intravenous, On Call Procedure, Quentin Peterson MD, 1.5 g at 05/09/24 1142    dextrose 10% bolus 125 mL 125 mL, 12.5 g, Intravenous, PRN, Anna Mitchell PA    dextrose 10% bolus 250 mL 250 mL, 25 g, Intravenous, PRN, Anna Mitchell PA    docusate sodium capsule 100 mg, 100 mg, Oral, BID, Anna Mitchell PA, 100 mg at 05/13/24 0821    enoxaparin injection 40 mg, 40 mg, Subcutaneous, Daily, Lissy London FNP, 40 mg at 05/12/24 1813    famotidine tablet 20 mg, 20 mg, Oral, Daily, Nehemias Talamantes MD, 20 mg at 05/13/24  0821    folic acid tablet 1 mg, 1 mg, Oral, Daily, Anna Mitchell PA, 1 mg at 05/13/24 0821    hydrALAZINE injection 10 mg, 10 mg, Intravenous, Q4H PRN, Celso Watts Jr., MD    HYDROcodone-acetaminophen 5-325 mg per tablet 1 tablet, 1 tablet, Oral, Q4H PRN, Celso Watts Jr., MD, 1 tablet at 05/10/24 0821    HYDROcodone-acetaminophen 5-325 mg per tablet 1 tablet, 1 tablet, Oral, Q4H PRN, Anna Mitchell PA    lactulose 10 gram/15 ml solution 20 g, 20 g, Oral, Q6H PRN, Anna Mitchell PA    loperamide capsule 2 mg, 2 mg, Oral, Continuous PRN, Anna Mitchell PA    magnesium sulfate 2g in water 50mL IVPB (premix), 2 g, Intravenous, PRN, Anna Mitchell PA    magnesium sulfate 2g in water 50mL IVPB (premix), 4 g, Intravenous, PRN, Anna Mitchell PA    metoclopramide injection 5 mg, 5 mg, Intravenous, Q6H PRN, Anna Mitchell PA    metoprolol tartrate (LOPRESSOR) tablet 25 mg, 25 mg, Oral, BID, Brandon Rodriguez ANP, 25 mg at 05/13/24 0821    morphine injection 2 mg, 2 mg, Intravenous, Q4H PRN, Celso Watts Jr., MD    morphine injection 4 mg, 4 mg, Intravenous, Q4H PRN, Anna Mitchell PA, 4 mg at 05/09/24 1834    mupirocin 2 % ointment, , Nasal, On Call Procedure, Nehemias Talamantes MD    nitroGLYCERIN SL tablet 0.4 mg, 0.4 mg, Sublingual, Q5 Min PRN, Aubrey Davis III, MD    ondansetron injection 4 mg, 4 mg, Intravenous, Q8H PRN, Celso Watts Jr., MD    ondansetron injection 4 mg, 4 mg, Intravenous, Q4H PRN, Anna Mitchell PA, 4 mg at 05/10/24 0823    oxyCODONE immediate release tablet 5 mg, 5 mg, Oral, Q4H PRN, Nehemias Talamantes MD, 5 mg at 05/12/24 2245    oxyCODONE immediate release tablet Tab 10 mg, 10 mg, Oral, Q4H PRN, Anna Mitchell PA, 10 mg at 05/10/24 [facility]    sodium chloride 0.9% flush 10 mL, 10 mL, Intravenous, PRN, Aubrey Davis III, MD    sodium chloride 0.9% flush 10 mL, 10 mL, Intravenous, PRN, Danuta Cheek, MITCHELL    sucralfate tablet 1 g, 1 g, Oral,  QID (AC & HS), Anna Mitchell PA, 1 g at 05/13/24 0615  VTE Risk Mitigation (From admission, onward)           Ordered     enoxaparin injection 40 mg  Daily         05/10/24 1227     IP VTE HIGH RISK PATIENT  Once         05/10/24 1227     Place MARISELA hose  Until discontinued         05/09/24 1455     Place sequential compression device  Until discontinued         05/05/24 1244                  Assessment:   MVCAD    - 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 to LAD, SV to RCA Streaker; LAAL    - LHC (5.6.24): Severe CAD; Mid LAD 60-70% Stenosis IFR 0.96, Ostial Left Circumflex 95%, Prox RCA 90%    - PET (5.2.24): Concern for Anterior Ischemia    - ECHO (5.2.24) - LVEF 60%  Acute Respiratory Failure Requiring Intubation/Ventilation - Now on RA   Hypotension requiring Pressors - Resolved     - History of HTN  Unstable Angina - Resolved   HLD  GERD  Obesity   No Hx of GIB    Plan:   Continue ASA, BB and Statin   Aggressive Mobilization of PT and Q1HR IS  Disposition per CV Surgery   Will Continue to Follow  Labs in AM: CBC, CMP and Mg     YELENA Salinas  Cardiology  [facility]  05/13/2024    I agree with the findings of the complexity of problems addressed and take responsibility for the plan's risks and complications. I approved the plan documented by Brandon Rodriguez NP.

## 2024-05-13 NOTE — PLAN OF CARE
Problem: Occupational Therapy  Goal: Occupational Therapy Goal  Description: LTG: Pt will perform basic ADLs and ADL transfers with Modified independence and good compliance to sternal precautions using LRAD by discharge.    STG: to be met by 6/13/24    Pt will complete grooming standing at sink with LRAD with SBA.  Pt will complete UB dressing with SBA.  Pt will complete LB dressing with SBA using LRAD and AE prn.  Pt will complete toileting with SBA using LRAD.  Pt will complete functional mobility to/from toilet and toilet transfer with SBA using LRAD.   Pt will independently verbalize sternal precautions with >90% accuracy.   Outcome: Progressing

## 2024-05-13 NOTE — PROGRESS NOTES
05/13/24 0925   Pre Exercise Vitals   /70   Pulse 75   Supplemental O2? No   SpO2 100 %   During Exercise Vitals   Pulse 88   SpO2 97 %   Distance Walked 100 feet   Post Exercise Vitals   /86   Pulse 82   Supplemental O2? No   SpO2 99 %   Modality   Modality   (rollator)     Min assist x1 from sitting to standing. Sternal precautions maintained. Gait steady, slow with rollator. Tolerated well. Assisted back to chair. Encouraged incentive spirometer q 1 hour and increase activity. Communicated with nurse pre and post walk.

## 2024-05-13 NOTE — NURSING
Nurses Note -- 4 Eyes      5/13/2024   8:58 AM      Skin assessed during: Daily Assessment      [x] No Altered Skin Integrity Present    [x]Prevention Measures Documented      [] Yes- Altered Skin Integrity Present or Discovered   [] LDA Added if Not in Epic (Describe Wound)   [] New Altered Skin Integrity was Present on Admit and Documented in LDA   [] Wound Image Taken    Wound Care Consulted? No    Attending Nurse:  Anahi Vital RN/Staff Member:  Dennis

## 2024-05-13 NOTE — PT/OT/SLP PROGRESS
Physical Therapy Treatment    Patient Name:  Tanvi Miranda   MRN:  [id number]    Recommendations:     Discharge therapy intensity: Low Intensity Therapy   Discharge Equipment Recommendations: walker, rolling  Barriers to discharge: None    Assessment:     Tanvi Miranda is a 68 y.o. female admitted with a medical diagnosis of CABG x2, ligation of KISHORE.  She presents with the following impairments/functional limitations: weakness, impaired endurance, impaired functional mobility, decreased coordination.     Pt was able to transfer with CGA and maintain sternal precautions. Pt also ambulated 150ft with RW, SBA, and performed stair climbing with handrail and CGA. Tolerated well.     Rehab Prognosis: Good; patient would benefit from acute skilled PT services to address these deficits and reach maximum level of function.    Recent Surgery: Procedure(s) (LRB):  CORONARY ARTERY BYPASS GRAFT (CABG) (N/A)  SURGICAL PROCUREMENT, VEIN, ENDOSCOPIC (N/A)  Left atrial appendage closure device (N/A) 4 Days Post-Op    Plan:     During this hospitalization, patient would benefit from acute PT services 5 x/week to address the identified rehab impairments via gait training, therapeutic activities, therapeutic exercises and progress toward the following goals:    Plan of Care Expires:  06/12/24    Subjective     Chief Complaint: none  Patient/Family Comments/goals: to go home  Pain/Comfort:  Pain Rating 1: 0/10      Objective:     Communicated with nurse prior to session.  Patient found supine with   upon PT entry to room.     General Precautions: Standard, fall, sternal  Orthopedic Precautions: N/A  Braces: N/A  Respiratory Status: Room air  Blood Pressure: 111/65, 78 HR, 95%  Skin Integrity: Visible skin intact      Functional Mobility:  Bed Mobility:     Scooting: stand by assistance  Transfers:     Sit to Stand:  contact guard assistance with rolling walker  Gait: 150ft with RW and SBA, slow pace, VC to increase pace and to look  up.   Stairs:  Pt ascended/descended 5 stair(s) with No Assistive Device with right handrail with Contact Guard Assistance.     Education:  Patient provided with verbal education education regarding PT role/goals/POC, post-op precautions, fall prevention, and safety awareness.  Understanding was verbalized.     Patient left up in chair with all lines intact, call button in reach, and nurse notified    GOALS:   Multidisciplinary Problems       Physical Therapy Goals          Problem: Physical Therapy    Goal Priority Disciplines Outcome Goal Variances Interventions   Physical Therapy Goal     PT, PT/OT Progressing     Description: Goals to be met by: 24     Patient will increase functional independence with mobility by performin. Supine to sit with Stand-by Assistance  2. Sit to stand transfer with Supervision  3. Gait  x 200 feet with Supervision using Rolling Walker.                          Time Tracking:     PT Received On: 24  PT Start Time: 1030     PT Stop Time: 1054  PT Total Time (min): 24 min     Billable Minutes: Gait Training 12 and Therapeutic Activity 12    Treatment Type: Treatment  PT/PTA: PT     Number of PTA visits since last PT visit: 2024

## 2024-05-13 NOTE — PLAN OF CARE
05/13/24 0911   Discharge Reassessment   Assessment Type Discharge Planning Brief Assessment   Did the patient's condition or plan change since previous assessment? Yes   Discharge Plan discussed with: Patient   Communicated MAXIMUS with patient/caregiver Yes  (probable today)   Discharge Plan A Home with family;Home Health   Discharge Plan B Home with family;Home Health   DME Needed Upon Discharge  walker, rolling   Transition of Care Barriers None   Why the patient remains in the hospital Requires continued medical care   Post-Acute Status   Post-Acute Authorization Home Health   Home Health Status Referrals Sent   Hospital Resources/Appts/Education Provided Post-Acute resouces added to AVS   Patient choice form signed by patient/caregiver List with quality metrics by geographic area provided   Discharge Delays None known at this time     Patient will be discharged today. Spoke to patient about home health for nursing and therapy and walker. She agreed to walker and home health. Provided patient choice list.She chose [facility]. Sent referral via [facility]. Sent order to [facility] for walker

## 2024-05-13 NOTE — PT/OT/SLP EVAL
"Occupational Therapy  Evaluation    Name: Tanvi Miranda  MRN: [id number]  Admitting Diagnosis: Chest pain   Recent Surgery: Procedure(s) (LRB):  CORONARY ARTERY BYPASS GRAFT (CABG) (N/A)  SURGICAL PROCUREMENT, VEIN, ENDOSCOPIC (N/A)  Left atrial appendage closure device (N/A) 4 Days Post-Op    Recommendations:     Discharge therapy intensity: Low Intensity Therapy   Discharge Equipment Recommendations:  walker, rolling  Barriers to discharge:  Other (Comment) (ongoing medical needs)    Assessment:     Tanvi Miranda is a 68 y.o. female with a medical diagnosis of CAD s/p CABG x2 and ligation of KISHORE on 5/9. She is A&Ox4 and tolerated OT evaluation well. She presents with the following performance deficits affecting function: weakness, impaired endurance, impaired self care skills, impaired functional mobility, decreased coordination, other (comment) (sternal precautions). Pt educated on sternal precautions and verbalized understanding. She required min-CGA for functional mobility within room and min A for ADL tasks presented. She required verbal cues for adherence to sternal precautions throughout session. Recommend low intensity therapy with assist from family upon d/c.     Rehab Prognosis: Good; patient would benefit from acute skilled OT services to address these deficits and reach maximum level of function.       Plan:     Patient to be seen 4 x/week to address the above listed problems via self-care/home management, therapeutic activities, therapeutic exercises  Plan of Care Expires: 06/13/24  Plan of Care Reviewed with: patient, spouse, sibling    Subjective     Chief Complaint: "need to use the bathroom"  Patient/Family Comments/goals: to get better     Occupational Profile:  Living Environment: Pt reports living with her  in a mobile home with x5 steps to enter and x1 hand rail. Pt has both a tub/shower combo  and walk-in shower with a built-in bath bench.    Previous level of function: Pt reports " being independent with ADLs prior.   Roles and Routines: Wife   Equipment Used at Home:  (built-in bath bench)  Assistance upon Discharge: Pt will have assist from family upon d/c.     Pain/Comfort:  Pain Rating 1: 0/10    Patients cultural, spiritual, Congregational conflicts given the current situation: no    Objective:     OT communicated with RN prior to session.      Patient was found up in chair with pulse ox (continuous), telemetry, blood pressure cuff, peripheral IV upon OT entry to room.    General Precautions: Standard, fall, sternal  Orthopedic Precautions: N/A  Braces: N/A    Vital Signs: Blood Pressure: 102/71  Respiratory Status: on room air    Bed Mobility:    Pt up in chair upon OT entry.     Functional Mobility/Transfers:  Patient completed Sit <> Stand Transfer with contact guard assistance  with  rolling walker   Patient completed Chair Transfer using Step Transfer technique with contact guard assistance with rolling walker  Patient completed Toilet Transfer Step Transfer technique with minimum assistance with  rolling walker    Activities of Daily Living:  Toileting: minimum assistance to complete toilet transfer and for perineal hygiene.     AMPAC 6 Click ADL:  AMPAC Total Score: 20    Functional Cognition:  Orientation: oriented to Person, Place, Time, and Situation  Safety Awareness: WFL    Visual Perceptual Skills:  Intact    Upper Extremity Function:  Right Upper Extremity:   Range of Motion: WFL within sternal precautions.   Strength: WFL within sternal precautions.   Coordination: WFL    Left Upper Extremity:  Range of Motion: WFL within sternal precautions.   Strength: WFL within sternal precautions.   Coordination: WFL    Balance:   Static sitting balance: WFL  Dynamic sitting balance:WFL  Static standing balance:CGA  Dynamic standing balance:CGA    Therapeutic Positioning  Risk for acquired pressure injuries is decreased due to ability to get to BSC/toilet with assist.    OT interventions  performed during the course of today's session:   Therapeutic positioning was provided at the conclusion of session to offload all bony prominences for the prevention and/or reduction of pressure injuries    Skin assessment: all bony prominences were assessed    Findings: known area of altered skin integrity at sternal incision.     OT recommendations for therapeutic positioning throughout hospitalization:   Follow [facility] Pressure Injury Prevention Protocol  Geomat recommended for sacral protection while [facility]    Patient Education:  Patient, spouse were, and sibling  provided with verbal education education regarding OT role/goals/POC, post op precautions, fall prevention, safety awareness, Discharge/DME recommendations, and pressure ulcer prevention.  Understanding was verbalized.     Patient left up in chair with all lines intact, call button in reach, geomat cushion, RN notified, and family present.    GOALS:   Multidisciplinary Problems       Occupational Therapy Goals          Problem: Occupational Therapy    Goal Priority Disciplines Outcome Interventions   Occupational Therapy Goal     OT, PT/OT Progressing    Description: LTG: Pt will perform basic ADLs and ADL transfers with Modified independence and good compliance to sternal precautions using LRAD by discharge.    STG: to be met by 6/13/24    Pt will complete grooming standing at sink with LRAD with SBA.  Pt will complete UB dressing with SBA.  Pt will complete LB dressing with SBA using LRAD and AE prn.  Pt will complete toileting with SBA using LRAD.  Pt will complete functional mobility to/from toilet and toilet transfer with SBA using LRAD.   Pt will independently verbalize sternal precautions with >90% accuracy.                        History:     History reviewed. No pertinent past medical history.      Past Surgical History:   Procedure Laterality Date    CLOSURE OF LEFT ATRIAL APPENDAGE USING DEVICE N/A 5/9/2024    Procedure: Left atrial appendage  closure device;  Surgeon: Nehemias Talamantes MD;  Location: [facility];  Service: Cardiology;  Laterality: N/A;    CORONARY ARTERY BYPASS GRAFT (CABG) N/A 5/9/2024    Procedure: CORONARY ARTERY BYPASS GRAFT (CABG);  Surgeon: Nehemias Talamantes MD;  Location: [facility] OR;  Service: Cardiothoracic;  Laterality: N/A;  ECHO NOTIFIED    ENDOSCOPIC HARVEST OF VEIN N/A 5/9/2024    Procedure: SURGICAL PROCUREMENT, VEIN, ENDOSCOPIC;  Surgeon: Nehemias Talamantes MD;  Location: [facility];  Service: Cardiothoracic;  Laterality: N/A;    LEFT HEART CATHETERIZATION Left 5/6/2024    Procedure: Left heart cath;  Surgeon: Celso Watts Jr., MD;  Location: [facility] CATH LAB;  Service: Cardiology;  Laterality: Left;       Time Tracking:     OT Date of Treatment: 05/13/24  OT Start Time: 1422  OT Stop Time: 1439  OT Total Time (min): 17 min    Billable Minutes:Evaluation Moderate Complexity.     5/13/2024

## 2024-05-13 NOTE — PROGRESS NOTES
CT SURGERY PROGRESS NOTE  Tanvi Miranda  68 y.o.  1955    Patients Procedure: Procedure(s) (LRB):  CORONARY ARTERY BYPASS GRAFT (CABG) (N/A)  SURGICAL PROCUREMENT, VEIN, ENDOSCOPIC (N/A)  Left atrial appendage closure device (N/A)    Subjective  Interval History: Patient is postoperative day 4 from CABG x 2 with EVH and ligation of left atrial appendage.          Medication List  Infusions   loperamide  2 mg Oral Continuous PRN         Scheduled   aspirin  81 mg Oral Daily    atorvastatin  80 mg Oral QHS    docusate sodium  100 mg Oral BID    enoxparin  40 mg Subcutaneous Daily    famotidine  20 mg Oral Daily    folic acid  1 mg Oral Daily    metoprolol tartrate  25 mg Oral BID    sucralfate  1 g Oral QID (AC & HS)       Objective:  Recent Vitals:  Temp:  [98.3 °F (36.8 °C)-98.8 °F (37.1 °C)] 98.4 °F (36.9 °C)  Pulse:  [] 73  Resp:  [19-36] 26  SpO2:  [86 %-100 %] 97 %  BP: (105-147)/(66-91) 105/66    Physical Exam  Constitutional:       General: She is not in acute distress.     Appearance: She is not ill-appearing.   HENT:      Head: Normocephalic and atraumatic.      Mouth/Throat:      Mouth: Mucous membranes are moist.      Pharynx: Oropharynx is clear.   Cardiovascular:      Rate and Rhythm: Normal rate and regular rhythm.      Pulses: Normal pulses.      Heart sounds: Normal heart sounds. No murmur heard.     No friction rub. No gallop.   Pulmonary:      Effort: Pulmonary effort is normal. No respiratory distress.      Breath sounds: Normal breath sounds. No wheezing, rhonchi or rales.   Abdominal:      General: There is no distension.      Palpations: Abdomen is soft.   Musculoskeletal:         General: Normal range of motion.      Cervical back: Normal range of motion and neck supple.      Right lower leg: No edema.      Left lower leg: No edema.   Skin:     General: Skin is warm and dry.      Comments: Median sternotomy incision dressed - c/d/I  Ct x 2 in place.  Dressed. C/d/i    Neurological:      General: No focal deficit present.      Mental Status: She is alert and oriented to person, place, and time.   Psychiatric:         Mood and Affect: Mood normal.         Behavior: Behavior normal.          I/O last 24 hrs:  Intake/Output - Last 3 Shifts         05/11 0700  05/12 0659 05/12 0700 05/13 0659 05/13 0700  05/14 0659    P.O.  760     I.V. (mL/kg)       IV Piggyback       Total Intake(mL/kg)  760 (9.2)     Urine (mL/kg/hr) 1000 (0.5) 0 (0)     Stool  0     Chest Tube 60 40     Total Output 1060 40     Net -1060 +720            Urine Occurrence  3 x 1 x    Stool Occurrence  1 x             Labs  CBC:   Recent Labs   Lab 05/13/24 0408   WBC 5.62   RBC 3.80*   HGB 11.0*   HCT 33.7*      MCV 88.7   MCH 28.9   MCHC 32.6*     CMP:   Recent Labs   Lab 05/13/24 0408   CALCIUM 8.9   ALBUMIN 2.5*      K 3.8   CO2 27   BUN 10.7   CREATININE 0.69   ALKPHOS 60   ALT 14   AST 26   BILITOT 0.7     LFTs:   Recent Labs   Lab 05/13/24  0408   ALT 14   AST 26   ALKPHOS 60   BILITOT 0.7   ALBUMIN 2.5*     All pertinent labs from the last 24 hours have been reviewed.      Imaging:   CXR: X-Ray Chest AP Portable    Result Date: 5/10/2024  1. Interval removal of endotracheal and enteric tubes. 2. Bibasilar subsegmental atelectasis. Electronically signed by: Neda Cuevas Date:    05/10/2024 Time:    05:56    X-Ray Chest AP Portable    Result Date: 5/9/2024  Postoperative changes Electronically signed by: Garth Dixon Date:    05/09/2024 Time:    15:35   I have reviewed all pertinent imaging results/findings within the past 24 hours.        ASSESSMENT/PLAN:    Multivessel coronary artery disease  HTN  HLD  Significant family history of early CAD    -s/p CABG x 2 with EVH and LLAA  -OOB.  Mobilize.  Aggressive IS use  -OK to downgrade to telemetry - waiting on bed  -DC home soon 24 with HHRN     Case and plan of care discussed with MD Celso Carlson PAC

## 2024-05-13 NOTE — NURSING
Nurses Note -- 4 Eyes      5/13/2024   4:32 AM      Skin assessed during: Daily Assessment      [x] No Altered Skin Integrity Present    [x]Prevention Measures Documented      [] Yes- Altered Skin Integrity Present or Discovered   [] LDA Added if Not in Epic (Describe Wound)   [] New Altered Skin Integrity was Present on Admit and Documented in LDA   [] Wound Image Taken    Wound Care Consulted? No    Attending Nurse:  Diana Vital RN/Staff Member:  Yovanny DENSON

## 2024-05-14 VITALS
OXYGEN SATURATION: 99 % | RESPIRATION RATE: 25 BRPM | DIASTOLIC BLOOD PRESSURE: 89 MMHG | HEIGHT: 63 IN | HEART RATE: 93 BPM | SYSTOLIC BLOOD PRESSURE: 128 MMHG | WEIGHT: 179 LBS | TEMPERATURE: 98 F | BODY MASS INDEX: 31.71 KG/M2

## 2024-05-14 LAB
ALBUMIN SERPL-MCNC: 2.4 G/DL (ref 3.4–4.8)
ALBUMIN/GLOB SERPL: 0.9 RATIO (ref 1.1–2)
ALP SERPL-CCNC: 52 UNIT/L (ref 40–150)
ALT SERPL-CCNC: 14 UNIT/L (ref 0–55)
AST SERPL-CCNC: 24 UNIT/L (ref 5–34)
BASOPHILS # BLD AUTO: 0.06 X10(3)/MCL
BASOPHILS NFR BLD AUTO: 1 %
BILIRUB SERPL-MCNC: 0.9 MG/DL
BUN SERPL-MCNC: 9.4 MG/DL (ref 9.8–20.1)
CALCIUM SERPL-MCNC: 8.7 MG/DL (ref 8.4–10.2)
CHLORIDE SERPL-SCNC: 110 MMOL/L (ref 98–107)
CO2 SERPL-SCNC: 23 MMOL/L (ref 23–31)
CREAT SERPL-MCNC: 0.65 MG/DL (ref 0.55–1.02)
EOSINOPHIL # BLD AUTO: 0.23 X10(3)/MCL (ref 0–0.9)
EOSINOPHIL NFR BLD AUTO: 4 %
ERYTHROCYTE [DISTWIDTH] IN BLOOD BY AUTOMATED COUNT: 14.1 % (ref 11.5–17)
GFR SERPLBLD CREATININE-BSD FMLA CKD-EPI: >60 ML/MIN/1.73/M2
GLOBULIN SER-MCNC: 2.7 GM/DL (ref 2.4–3.5)
GLUCOSE SERPL-MCNC: 93 MG/DL (ref 82–115)
HCT VFR BLD AUTO: 32.5 % (ref 37–47)
HGB BLD-MCNC: 10.9 G/DL (ref 12–16)
IMM GRANULOCYTES # BLD AUTO: 0.11 X10(3)/MCL (ref 0–0.04)
IMM GRANULOCYTES NFR BLD AUTO: 1.9 %
LYMPHOCYTES # BLD AUTO: 1.31 X10(3)/MCL (ref 0.6–4.6)
LYMPHOCYTES NFR BLD AUTO: 22.8 %
MAGNESIUM SERPL-MCNC: 1.9 MG/DL (ref 1.6–2.6)
MCH RBC QN AUTO: 29.4 PG (ref 27–31)
MCHC RBC AUTO-ENTMCNC: 33.5 G/DL (ref 33–36)
MCV RBC AUTO: 87.6 FL (ref 80–94)
MONOCYTES # BLD AUTO: 0.62 X10(3)/MCL (ref 0.1–1.3)
MONOCYTES NFR BLD AUTO: 10.8 %
NEUTROPHILS # BLD AUTO: 3.41 X10(3)/MCL (ref 2.1–9.2)
NEUTROPHILS NFR BLD AUTO: 59.5 %
NRBC BLD AUTO-RTO: 0 %
PLATELET # BLD AUTO: 147 X10(3)/MCL (ref 130–400)
PMV BLD AUTO: 10.6 FL (ref 7.4–10.4)
POTASSIUM SERPL-SCNC: 4 MMOL/L (ref 3.5–5.1)
PROT SERPL-MCNC: 5.1 GM/DL (ref 5.8–7.6)
RBC # BLD AUTO: 3.71 X10(6)/MCL (ref 4.2–5.4)
SODIUM SERPL-SCNC: 140 MMOL/L (ref 136–145)
WBC # SPEC AUTO: 5.74 X10(3)/MCL (ref 4.5–11.5)

## 2024-05-14 PROCEDURE — 99024 POSTOP FOLLOW-UP VISIT: CPT | Mod: ,,,

## 2024-05-14 PROCEDURE — 25000003 PHARM REV CODE 250: Performed by: NURSE PRACTITIONER

## 2024-05-14 PROCEDURE — 36415 COLL VENOUS BLD VENIPUNCTURE: CPT | Performed by: NURSE PRACTITIONER

## 2024-05-14 PROCEDURE — 99900031 HC PATIENT EDUCATION (STAT)

## 2024-05-14 PROCEDURE — 25000003 PHARM REV CODE 250: Performed by: THORACIC SURGERY (CARDIOTHORACIC VASCULAR SURGERY)

## 2024-05-14 PROCEDURE — 94760 N-INVAS EAR/PLS OXIMETRY 1: CPT

## 2024-05-14 PROCEDURE — 25000003 PHARM REV CODE 250: Performed by: PHYSICIAN ASSISTANT

## 2024-05-14 PROCEDURE — 97110 THERAPEUTIC EXERCISES: CPT

## 2024-05-14 PROCEDURE — 85025 COMPLETE CBC W/AUTO DIFF WBC: CPT | Performed by: NURSE PRACTITIONER

## 2024-05-14 PROCEDURE — 94799 UNLISTED PULMONARY SVC/PX: CPT

## 2024-05-14 PROCEDURE — 80053 COMPREHEN METABOLIC PANEL: CPT | Performed by: NURSE PRACTITIONER

## 2024-05-14 PROCEDURE — 99900035 HC TECH TIME PER 15 MIN (STAT)

## 2024-05-14 PROCEDURE — 83735 ASSAY OF MAGNESIUM: CPT | Performed by: NURSE PRACTITIONER

## 2024-05-14 RX ORDER — HYDROCODONE BITARTRATE AND ACETAMINOPHEN 5; 325 MG/1; MG/1
1 TABLET ORAL EVERY 6 HOURS PRN
Qty: 28 TABLET | Refills: 0 | Status: SHIPPED | OUTPATIENT
Start: 2024-05-14 | End: 2024-05-29 | Stop reason: SDUPTHER

## 2024-05-14 RX ORDER — FUROSEMIDE 40 MG/1
40 TABLET ORAL ONCE
Status: COMPLETED | OUTPATIENT
Start: 2024-05-14 | End: 2024-05-14

## 2024-05-14 RX ORDER — ASPIRIN 81 MG/1
81 TABLET ORAL DAILY
Qty: 30 TABLET | Refills: 11 | Status: SHIPPED | OUTPATIENT
Start: 2024-05-14 | End: 2025-05-14

## 2024-05-14 RX ORDER — METOPROLOL TARTRATE 25 MG/1
25 TABLET, FILM COATED ORAL 2 TIMES DAILY
Qty: 180 TABLET | Refills: 3 | Status: SHIPPED | OUTPATIENT
Start: 2024-05-14 | End: 2025-05-14

## 2024-05-14 RX ORDER — FUROSEMIDE 10 MG/ML
40 INJECTION INTRAMUSCULAR; INTRAVENOUS ONCE
Status: DISCONTINUED | OUTPATIENT
Start: 2024-05-14 | End: 2024-05-14

## 2024-05-14 RX ADMIN — METOPROLOL TARTRATE 25 MG: 25 TABLET, FILM COATED ORAL at 08:05

## 2024-05-14 RX ADMIN — ASPIRIN 81 MG: 81 TABLET, COATED ORAL at 08:05

## 2024-05-14 RX ADMIN — FOLIC ACID 1 MG: 1 TABLET ORAL at 08:05

## 2024-05-14 RX ADMIN — DOCUSATE SODIUM 100 MG: 100 CAPSULE, LIQUID FILLED ORAL at 08:05

## 2024-05-14 RX ADMIN — FUROSEMIDE 40 MG: 40 TABLET ORAL at 10:05

## 2024-05-14 RX ADMIN — FAMOTIDINE 20 MG: 20 TABLET, FILM COATED ORAL at 08:05

## 2024-05-14 NOTE — PLAN OF CARE
05/14/24 0845   Final Note   Assessment Type Final Discharge Note   Anticipated Discharge Disposition Home-Health   Post-Acute Status   Post-Acute Authorization Home Health   Home Health Status Set-up Complete/Auth obtained   Discharge Delays None known at this time     Patient is discharged today to home. [facility] sent AVS, d/c summary and d/c order. Walker was delivered yesterday.

## 2024-05-14 NOTE — PROGRESS NOTES
CT SURGERY PROGRESS NOTE  Tanvi Miranda  68 y.o.  1955    Patients Procedure: Procedure(s) (LRB):  CORONARY ARTERY BYPASS GRAFT (CABG) (N/A)  SURGICAL PROCUREMENT, VEIN, ENDOSCOPIC (N/A)  Left atrial appendage closure device (N/A)    Subjective  Interval History: Patient is postoperative day 5 from CABG x 2 with EVH and ligation of left atrial appendage.  No acute events overnight.  Sitting up in chair this morning.  VSS, no siginficant complaints.     Review of Systems   Constitutional:  Negative for chills, fever and malaise/fatigue.   Respiratory:  Negative for cough, shortness of breath and wheezing.    Cardiovascular:  Negative for chest pain and palpitations.   Gastrointestinal:  Negative for nausea and vomiting.       Medication List  Infusions   loperamide  2 mg Oral Continuous PRN         Scheduled   aspirin  81 mg Oral Daily    atorvastatin  80 mg Oral QHS    docusate sodium  100 mg Oral BID    enoxparin  40 mg Subcutaneous Daily    famotidine  20 mg Oral Daily    folic acid  1 mg Oral Daily    metoprolol tartrate  25 mg Oral BID    sucralfate  1 g Oral QID (AC & HS)       Objective:  Recent Vitals:  Temp:  [98.2 °F (36.8 °C)-98.9 °F (37.2 °C)] 98.3 °F (36.8 °C)  Pulse:  [] 93  Resp:  [20-34] 25  SpO2:  [96 %-100 %] 98 %  BP: ()/(55-93) 128/89    Physical Exam  Constitutional:       General: She is not in acute distress.     Appearance: Normal appearance. She is not ill-appearing.   HENT:      Head: Normocephalic and atraumatic.      Mouth/Throat:      Mouth: Mucous membranes are moist.      Pharynx: Oropharynx is clear.   Cardiovascular:      Rate and Rhythm: Normal rate and regular rhythm.      Pulses: Normal pulses.      Heart sounds: Normal heart sounds. No murmur heard.     No friction rub. No gallop.   Pulmonary:      Effort: Pulmonary effort is normal. No respiratory distress.      Breath sounds: Normal breath sounds. No wheezing, rhonchi or rales.   Abdominal:      General:  Bowel sounds are normal. There is no distension.      Palpations: Abdomen is soft.   Musculoskeletal:         General: Normal range of motion.      Cervical back: Normal range of motion and neck supple.      Right lower leg: No edema.      Left lower leg: No edema.   Skin:     General: Skin is warm and dry.      Comments: Median sternotomy incision eula - c/d/i   Neurological:      General: No focal deficit present.      Mental Status: She is alert and oriented to person, place, and time.   Psychiatric:         Mood and Affect: Mood normal.         Behavior: Behavior normal.          I/O last 24 hrs:  Intake/Output - Last 3 Shifts         05/12 0700  05/13 0659 05/13 0700  05/14 0659 05/14 0700  05/15 0659    P.O. 760      Total Intake(mL/kg) 760 (9.2)      Urine (mL/kg/hr) 0 (0)      Stool 0      Chest Tube 40      Total Output 40      Net +720             Urine Occurrence 3 x 3 x     Stool Occurrence 1 x              Labs  CBC:   Recent Labs   Lab 05/14/24  0336   WBC 5.74   RBC 3.71*   HGB 10.9*   HCT 32.5*      MCV 87.6   MCH 29.4   MCHC 33.5     CMP:   Recent Labs   Lab 05/14/24  0336   CALCIUM 8.7   ALBUMIN 2.4*      K 4.0   CO2 23   BUN 9.4*   CREATININE 0.65   ALKPHOS 52   ALT 14   AST 24   BILITOT 0.9     LFTs:   Recent Labs   Lab 05/14/24  0336   ALT 14   AST 24   ALKPHOS 52   BILITOT 0.9   ALBUMIN 2.4*     All pertinent labs from the last 24 hours have been reviewed.    ASSESSMENT/PLAN:    Multivessel coronary artery disease  HTN  HLD  Significant family history of early CAD    -s/p CABG x 2 with EVH and LLAA  -Stable for discharge home with home health.  Follow up with Dr. Talamantes in 3 weeks.     Case and plan of care discussed with Dr. Albin London, TYLER

## 2024-05-14 NOTE — PROGRESS NOTES
"[facility]    Cardiology  Progress Note    Patient Name: Tanvi Miranda  MRN: [id number]  Admission Date: 5/5/2024  Hospital Length of Stay: 9 days  Code Status: Full Code   Attending Physician: Nehemias Talamantes MD   Primary Care Physician: Rupinder, Primary Doctor  Expected Discharge Date: 5/14/2024  Principal Problem:Atherosclerosis of native coronary artery of native heart without angina pectoris    Subjective:     Brief HPI/Hospital Course: 68-year-old female that is known to Dr. Doe with a PMHx of HLD, HTN, GERD and family Hx of early CAD. She presented to [facility] ED on 5.5.24 with complaints of left sided chest pain. Patient recently had a VAN/PET and ECHO completed by Dr. Doe and was planned to follow up 5.9.24 for results. She reports associated SOB and fatugue with exertion. She also report radiating left arm pain. Her last [facility] was back in 2018. ED Course: HR 66, /84, RR 20, Temp 98.9F SpO2 100% on RA. Lab work showed: WBC 3.98, K 4.1, BUN/Cr 10.3/0.78, Ca 10.1, AST/ALT 28/23, BNP 12.7, Trop: <0.010 X2, EKG shows no acute ST changes. [facility] will admit for chest pain.     Hospital Course:   5.7.24:  NAD. VSS. No complaints of CP/SOB/Palps. "I feel okay"  5.8.24: NAD. VSS. No complaints of CP/SOB/Palps. "I feel okay" CV surgery planning CABG for tomorrow. Resting comfortably in bed.  5.9.24: NAD. Intubated/Sedated. VSS. On Epi and Precedex   5.10.24: NAD. Sitting in Bedside Chair. Denies SOB and Palps. + CP/Incisional. Insulin Drip per CVS. "I am ok, just some pain."   5.11.24: NAD. "I am feeling well." Denies SOB and Palps. + CP/Incisional.   5.12.24: NAD. "I am ok." Sitting in Bedside Chair. Denies SOB and Palps. + CP/Incisional.   5.13.24: NAD. Sitting in Bedside Chair. Denies SOB and Palps. + CP/Incisional.   5.14.24: NAD Noted. Vitals Stable. SR on Tele. BP Stable. Up 5.5 lbs, giving oral Lasix this AM. Planning for DC Today.     PMH: HLD, HTN, GERD and family Hx of early CAD  PSH: " Colonoscopy, tonsillectomy, Hysterectomy   Family History: Mother: CAD s/p PCI, Alzheimers, PAD, Brother & Sister: CAD  Social History: Denies Tobacco, illicit drug, or ETOH use.      Previous Cardiac Diagnostics:   CABG (5.9.24):  Procedure:  Coronary artery bypass grafting X 2 ,   Left internal mammary artery to the LAD   Saphenous venous graft to   RCA Streaker  Ligation of left atrial appendage  Endoscopic venous harvesting left greater saphenous vein    LHC (5.6.24):  There is significant coronary artery disease. Mid Left Anterior Descending has a 60-70% stenosis. Instantaneous wave-free ratio (iFR) was performed on the lesion, and found to be non-hemodynamically significant at 0.96. 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 Ostial Left Circumflex has a 95% stenosis. The distal vessel is supplied via left-to-right collaterals from the LAD (septal perforating branches). Prox Right Coronary Artery has a calcified 90% stenosis. The vessel is moderately tortuous. The distal vessel is supplied via left-to-right collaterals from the LAD (septal perforating branches). LVEDP is normal at 13 mmHg.    Bhumika PET (5.2.24):  The left ventricular cavity is noted to be normal on the stress studies. The stress left ventricular ejection fraction was calculated to be 63% and left ventricular global function is normal. The rest left ventricular cavity is noted to be normal. The rest left ventricular ejection fraction was calculated to be 50% and rest left ventricular global function is normal.   When compared to the resting ejection fraction (50%), the stress ejection fraction (63%) has increased.   There was a rise in myocardial blood flow between rest and stress.  Global myocardial blood flow reserve was 2.88.  Myocardial blood flow reserve is reduced in the inferior territory which is suggestive of flow limiting stenosis in this territory.  *FINAL READ NOT DONE*     ECHO (5.2.24):  The study quality is  average.   The left ventricle is normal in size. Global left ventricular systolic function is normal. The left ventricular ejection fraction is 60%.   Normal appearing valvular structures and function are within study limits.   The pulmonary artery systolic pressure is 10 mmHg. The pulmonary artery appears to be normal.     LHC ( 8.3.18):  LAD: arrises right from the aorta there is no LM. Significant calcification present in LAD. There is a 40-50% stenosis in the midpoint of the LAD. D2 is moderate in size with 20-30% stenosis   RCA: large prominent vessel. It is dominant. 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, Shows a long tubular  50% stenosis proximally with heavy calcification,  No obstructive disease was found   PLAN: Medical management     Review of Systems   Cardiovascular:  Negative for chest pain.   Respiratory:  Negative for shortness of breath.    All other systems reviewed and are negative.    Objective:     Vital Signs (Most Recent):  Temp: 98.3 °F (36.8 °C) (05/14/24 0800)  Pulse: 93 (05/14/24 0800)  Resp: (!) 25 (05/14/24 0800)  BP: 128/89 (05/14/24 0800)  SpO2: 99 % (05/14/24 0800) Vital Signs (24h Range):  Temp:  [98.2 °F (36.8 °C)-98.9 °F (37.2 °C)] 98.3 °F (36.8 °C)  Pulse:  [] 93  Resp:  [20-34] 25  SpO2:  [96 %-99 %] 99 %  BP: ()/(55-93) 128/89   Weight: 81.2 kg (179 lb 0.2 oz)  Body mass index is 31.71 kg/m².  SpO2: 99 %     No intake or output data in the 24 hours ending 05/14/24 1124    Lines/Drains/Airways       None                 Significant Labs:   Recent Results (from the past 72 hour(s))   CBC Without Differential    Collection Time: 05/12/24  4:36 AM   Result Value Ref Range    WBC 8.85 4.50 - 11.50 x10(3)/mcL    RBC 4.30 4.20 - 5.40 x10(6)/mcL    Hgb 12.5 12.0 - 16.0 g/dL    Hct 38.6 37.0 - 47.0 %    MCV 89.8 80.0 - 94.0 fL    MCH 29.1  27.0 - 31.0 pg    MCHC 32.4 (L) 33.0 - 36.0 g/dL    RDW 14.3 11.5 - 17.0 %    Platelet 108 (L) 130 - 400 x10(3)/mcL    MPV 11.0 (H) 7.4 - 10.4 fL    IPF 4.4 0.9 - 11.2 %    NRBC% 0.0 %   Comprehensive Metabolic Panel    Collection Time: 05/12/24  4:36 AM   Result Value Ref Range    Sodium 139 136 - 145 mmol/L    Potassium 4.3 3.5 - 5.1 mmol/L    Chloride 108 (H) 98 - 107 mmol/L    CO2 25 23 - 31 mmol/L    Glucose 92 82 - 115 mg/dL    Blood Urea Nitrogen 10.4 9.8 - 20.1 mg/dL    Creatinine 0.69 0.55 - 1.02 mg/dL    Calcium 9.2 8.4 - 10.2 mg/dL    Protein Total 5.6 (L) 5.8 - 7.6 gm/dL    Albumin 2.8 (L) 3.4 - 4.8 g/dL    Globulin 2.8 2.4 - 3.5 gm/dL    Albumin/Globulin Ratio 1.0 (L) 1.1 - 2.0 ratio    Bilirubin Total 1.0 <=1.5 mg/dL    ALP 55 40 - 150 unit/L    ALT 13 0 - 55 unit/L    AST 28 5 - 34 unit/L    eGFR >60 mL/min/1.73/m2   Magnesium    Collection Time: 05/12/24  4:36 AM   Result Value Ref Range    Magnesium Level 2.10 1.60 - 2.60 mg/dL   CBC with Differential    Collection Time: 05/12/24  4:36 AM   Result Value Ref Range    WBC 8.85 4.50 - 11.50 x10(3)/mcL    RBC 4.30 4.20 - 5.40 x10(6)/mcL    Hgb 12.5 12.0 - 16.0 g/dL    Hct 38.6 37.0 - 47.0 %    MCV 89.8 80.0 - 94.0 fL    MCH 29.1 27.0 - 31.0 pg    MCHC 32.4 (L) 33.0 - 36.0 g/dL    RDW 14.3 11.5 - 17.0 %    Platelet 108 (L) 130 - 400 x10(3)/mcL    MPV 11.0 (H) 7.4 - 10.4 fL    Neut % 73.1 %    Lymph % 15.8 %    Mono % 8.7 %    Eos % 1.6 %    Basophil % 0.5 %    Lymph # 1.40 0.6 - 4.6 x10(3)/mcL    Neut # 6.47 2.1 - 9.2 x10(3)/mcL    Mono # 0.77 0.1 - 1.3 x10(3)/mcL    Eos # 0.14 0 - 0.9 x10(3)/mcL    Baso # 0.04 <=0.2 x10(3)/mcL    IG# 0.03 0 - 0.04 x10(3)/mcL    IG% 0.3 %    NRBC% 0.0 %    IPF 4.4 0.9 - 11.2 %   Comprehensive Metabolic Panel    Collection Time: 05/13/24  4:08 AM   Result Value Ref Range    Sodium 142 136 - 145 mmol/L    Potassium 3.8 3.5 - 5.1 mmol/L    Chloride 109 (H) 98 - 107 mmol/L    CO2 27 23 - 31 mmol/L    Glucose 99 82 - 115  mg/dL    Blood Urea Nitrogen 10.7 9.8 - 20.1 mg/dL    Creatinine 0.69 0.55 - 1.02 mg/dL    Calcium 8.9 8.4 - 10.2 mg/dL    Protein Total 5.1 (L) 5.8 - 7.6 gm/dL    Albumin 2.5 (L) 3.4 - 4.8 g/dL    Globulin 2.6 2.4 - 3.5 gm/dL    Albumin/Globulin Ratio 1.0 (L) 1.1 - 2.0 ratio    Bilirubin Total 0.7 <=1.5 mg/dL    ALP 60 40 - 150 unit/L    ALT 14 0 - 55 unit/L    AST 26 5 - 34 unit/L    eGFR >60 mL/min/1.73/m2   Magnesium    Collection Time: 05/13/24  4:08 AM   Result Value Ref Range    Magnesium Level 2.00 1.60 - 2.60 mg/dL   CBC with Differential    Collection Time: 05/13/24  4:08 AM   Result Value Ref Range    WBC 5.62 4.50 - 11.50 x10(3)/mcL    RBC 3.80 (L) 4.20 - 5.40 x10(6)/mcL    Hgb 11.0 (L) 12.0 - 16.0 g/dL    Hct 33.7 (L) 37.0 - 47.0 %    MCV 88.7 80.0 - 94.0 fL    MCH 28.9 27.0 - 31.0 pg    MCHC 32.6 (L) 33.0 - 36.0 g/dL    RDW 14.1 11.5 - 17.0 %    Platelet 142 130 - 400 x10(3)/mcL    MPV 11.3 (H) 7.4 - 10.4 fL    Neut % 66.9 %    Lymph % 19.2 %    Mono % 9.3 %    Eos % 3.7 %    Basophil % 0.5 %    Lymph # 1.08 0.6 - 4.6 x10(3)/mcL    Neut # 3.76 2.1 - 9.2 x10(3)/mcL    Mono # 0.52 0.1 - 1.3 x10(3)/mcL    Eos # 0.21 0 - 0.9 x10(3)/mcL    Baso # 0.03 <=0.2 x10(3)/mcL    IG# 0.02 0 - 0.04 x10(3)/mcL    IG% 0.4 %    NRBC% 0.0 %   Comprehensive Metabolic Panel    Collection Time: 05/14/24  3:36 AM   Result Value Ref Range    Sodium 140 136 - 145 mmol/L    Potassium 4.0 3.5 - 5.1 mmol/L    Chloride 110 (H) 98 - 107 mmol/L    CO2 23 23 - 31 mmol/L    Glucose 93 82 - 115 mg/dL    Blood Urea Nitrogen 9.4 (L) 9.8 - 20.1 mg/dL    Creatinine 0.65 0.55 - 1.02 mg/dL    Calcium 8.7 8.4 - 10.2 mg/dL    Protein Total 5.1 (L) 5.8 - 7.6 gm/dL    Albumin 2.4 (L) 3.4 - 4.8 g/dL    Globulin 2.7 2.4 - 3.5 gm/dL    Albumin/Globulin Ratio 0.9 (L) 1.1 - 2.0 ratio    Bilirubin Total 0.9 <=1.5 mg/dL    ALP 52 40 - 150 unit/L    ALT 14 0 - 55 unit/L    AST 24 5 - 34 unit/L    eGFR >60 mL/min/1.73/m2   Magnesium    Collection Time:  05/14/24  3:36 AM   Result Value Ref Range    Magnesium Level 1.90 1.60 - 2.60 mg/dL   CBC with Differential    Collection Time: 05/14/24  3:36 AM   Result Value Ref Range    WBC 5.74 4.50 - 11.50 x10(3)/mcL    RBC 3.71 (L) 4.20 - 5.40 x10(6)/mcL    Hgb 10.9 (L) 12.0 - 16.0 g/dL    Hct 32.5 (L) 37.0 - 47.0 %    MCV 87.6 80.0 - 94.0 fL    MCH 29.4 27.0 - 31.0 pg    MCHC 33.5 33.0 - 36.0 g/dL    RDW 14.1 11.5 - 17.0 %    Platelet 147 130 - 400 x10(3)/mcL    MPV 10.6 (H) 7.4 - 10.4 fL    Neut % 59.5 %    Lymph % 22.8 %    Mono % 10.8 %    Eos % 4.0 %    Basophil % 1.0 %    Lymph # 1.31 0.6 - 4.6 x10(3)/mcL    Neut # 3.41 2.1 - 9.2 x10(3)/mcL    Mono # 0.62 0.1 - 1.3 x10(3)/mcL    Eos # 0.23 0 - 0.9 x10(3)/mcL    Baso # 0.06 <=0.2 x10(3)/mcL    IG# 0.11 (H) 0 - 0.04 x10(3)/mcL    IG% 1.9 %    NRBC% 0.0 %     Telemetry: SR    Physical Exam  Vitals and nursing note reviewed.   Constitutional:       Appearance: Normal appearance.   HENT:      Head: Normocephalic.      Mouth/Throat:      Mouth: Mucous membranes are moist.      Pharynx: Oropharynx is clear.   Cardiovascular:      Rate and Rhythm: Normal rate and regular rhythm.      Heart sounds: Normal heart sounds.   Pulmonary:      Effort: Pulmonary effort is normal. No respiratory distress.      Breath sounds: Normal breath sounds. No wheezing or rales.   Abdominal:      General: There is no distension.      Palpations: Abdomen is soft.      Tenderness: There is no abdominal tenderness. There is no guarding.   Musculoskeletal:         General: Normal range of motion.      Cervical back: Neck supple.   Skin:     General: Skin is warm and dry.   Neurological:      General: No focal deficit present.      Mental Status: She is alert and oriented to person, place, and time. Mental status is at baseline.   Psychiatric:         Mood and Affect: Mood normal.         Behavior: Behavior normal.         Judgment: Judgment normal.       Current Inpatient Medications:  Current  Facility-Administered Medications:     0.9%  NaCl infusion (for blood administration), , Intravenous, Q24H PRN, Lissy London FNP    acetaminophen tablet 650 mg, 650 mg, Oral, Q4H PRN, Celso Watts Jr., MD, 650 mg at 05/07/24 1230    albumin human 5% bottle 12.5 g, 12.5 g, Intravenous, PRN, Anna Mitchell PA, Stopped at 05/10/24 0157    aspirin EC tablet 81 mg, 81 mg, Oral, Daily, Anna Mitchell PA, 81 mg at 05/14/24 0833    atorvastatin tablet 80 mg, 80 mg, Oral, QHS, Danuta Cheek NP, 80 mg at 05/13/24 2001    calcium gluconate 1 g in NS IVPB (premixed), 1 g, Intravenous, PRN, Anna Mitchell PA    calcium gluconate 1 g in NS IVPB (premixed), 2 g, Intravenous, PRN, Anna Mitchell PA    calcium gluconate 1 g in NS IVPB (premixed), 3 g, Intravenous, PRN, Anna Mitchell PA    cefUROXime sodium 1.5 g in dextrose 5 % in water (D5W) 100 mL IVPB (MB+), 1.5 g, Intravenous, On Call Procedure, Quentin Peterson MD, 1.5 g at 05/09/24 1142    dextrose 10% bolus 125 mL 125 mL, 12.5 g, Intravenous, PRN, Anna Mitchell PA    dextrose 10% bolus 250 mL 250 mL, 25 g, Intravenous, PRN, Anna Mitchell PA    docusate sodium capsule 100 mg, 100 mg, Oral, BID, Anna Mitchell PA, 100 mg at 05/14/24 0834    enoxaparin injection 40 mg, 40 mg, Subcutaneous, Daily, Lissy London FNP, 40 mg at 05/13/24 1607    famotidine tablet 20 mg, 20 mg, Oral, Daily, Nehemias Talamantes MD, 20 mg at 05/14/24 0833    folic acid tablet 1 mg, 1 mg, Oral, Daily, Anna Mitchell PA, 1 mg at 05/14/24 0833    hydrALAZINE injection 10 mg, 10 mg, Intravenous, Q4H PRN, Celso Watts Jr., MD    HYDROcodone-acetaminophen 5-325 mg per tablet 1 tablet, 1 tablet, Oral, Q4H PRN, Celso Watts Jr., MD, 1 tablet at 05/10/24 0821    HYDROcodone-acetaminophen 5-325 mg per tablet 1 tablet, 1 tablet, Oral, Q4H PRN, Anna Mitchell, PA    lactulose 10 gram/15 ml solution 20 g, 20 g, Oral, Q6H PRN, Anna Mitchell, PA    loperamide  capsule 2 mg, 2 mg, Oral, Continuous PRN, Anna Mitchell PA    magnesium sulfate 2g in water 50mL IVPB (premix), 2 g, Intravenous, PRN, Anna Mitchell PA    magnesium sulfate 2g in water 50mL IVPB (premix), 4 g, Intravenous, PRN, Anna Mitchell PA    metoclopramide injection 5 mg, 5 mg, Intravenous, Q6H PRN, Anna Mitchell PA    metoprolol tartrate (LOPRESSOR) tablet 25 mg, 25 mg, Oral, BID, Brandon Rodriguez ANP, 25 mg at 05/14/24 0834    morphine injection 2 mg, 2 mg, Intravenous, Q4H PRN, Celso Watts Jr., MD    morphine injection 4 mg, 4 mg, Intravenous, Q4H PRN, Anna Mitchell PA, 4 mg at 05/09/24 1834    mupirocin 2 % ointment, , Nasal, On Call Procedure, Nehemias Talamantes MD    nitroGLYCERIN SL tablet 0.4 mg, 0.4 mg, Sublingual, Q5 Min PRN, Aubrey Davis III, MD    ondansetron injection 4 mg, 4 mg, Intravenous, Q8H PRN, Celso Watts Jr., MD    ondansetron injection 4 mg, 4 mg, Intravenous, Q4H PRN, Anna Mitchell PA, 4 mg at 05/10/24 0823    oxyCODONE immediate release tablet 5 mg, 5 mg, Oral, Q4H PRN, Nehemias Talamantes MD, 5 mg at 05/13/24 2001    oxyCODONE immediate release tablet Tab 10 mg, 10 mg, Oral, Q4H PRN, Anna Mitchell PA, 10 mg at 05/10/24 1640    sodium chloride 0.9% flush 10 mL, 10 mL, Intravenous, PRN, Aubrey Davis III, MD    sodium chloride 0.9% flush 10 mL, 10 mL, Intravenous, PRN, Danuta Cheek NP    sucralfate tablet 1 g, 1 g, Oral, QID (AC & HS), Anna Mitchell PA, 1 g at 05/13/24 1137  VTE Risk Mitigation (From admission, onward)           Ordered     enoxaparin injection 40 mg  Daily         05/10/24 1227     IP VTE HIGH RISK PATIENT  Once         05/10/24 1227     Place MARISELA hose  Until discontinued         05/09/24 1455     Place sequential compression device  Until discontinued         05/05/24 1244                  Assessment:   CAD (Multivessel)    - 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 to LAD, SV to RCA Phu; LAAL    - LHC (5.6.24): Severe  CAD; Mid LAD 60-70% Stenosis IFR 0.96, Ostial Left Circumflex 95%, Prox RCA 90%    - PET (5.2.24): Concern for Anterior Ischemia    - ECHO (5.2.24) - LVEF 60%  Acute Respiratory Failure Requiring Intubation/Ventilation - Now on RA   Hypotension requiring Pressors - Resolved/BP Stable    - History of HTN  Unstable Angina - Resolved   Hyperlipidemia    - On Statin  GERD  Obesity   No Hx of GIB    Plan:   Continue Current Cardiac Medications Including: Aspirin 81 Mg Daily, Rosuvastatin 40 Mg QHS, Metoprolol Tartrate 25 Mg PO BID  Give Lasix 40 Mg PO x 1 Dose this AM (Weight up 5.5 lbs)  Resume Home Lisinopril 10 Mg Daily   Nitro SL PRN CP  Refer for ICR Re: CAD/CABG  Stable for DC From [facility] Standpoint  Follow up with [facility] Outpatient     TYLER Mills  Cardiology  [facility]  05/14/2024

## 2024-05-14 NOTE — DISCHARGE SUMMARY
[facility] - [facility] ICU  Cardiothoracic Surgery  Discharge Summary      Patient Name: Tanvi Miranda  MRN: [id number]  Admission Date: 5/5/2024  Hospital Length of Stay: 9 days  Discharge Date and Time:  05/14/2024 8:19 AM  Attending Physician: Nehemias Talamantes MD   Discharging Provider: TYLER Powell  Primary Care Provider: Rupinder, Primary Doctor    HPI:   No notes on file    Procedure(s) (LRB):  CORONARY ARTERY BYPASS GRAFT (CABG) (N/A)  SURGICAL PROCUREMENT, VEIN, ENDOSCOPIC (N/A)  Left atrial appendage closure device (N/A)      Indwelling Lines/Drains at time of discharge:   Lines/Drains/Airways       None                 Hospital Course: No notes on file    Goals of Care Treatment Preferences:  Code Status: Full Code      Consults (From admission, onward)          Status Ordering Provider     Inpatient consult to Social Work/Case Management  Once        Provider:  (Not yet assigned)    NEHEMIAS Coates     Inpatient consult to Cardiac Rehab  Once        Provider:  (Not yet assigned)    STACIE Juan     Inpatient consult to Cardiothoracic Surgery  Once        Provider:  Nehemias Talamantes MD    Completed BOYD REA            Significant Diagnostic Studies: Labs: CMP   Recent Labs   Lab 05/13/24  0408 05/14/24  0336    140   K 3.8 4.0   CO2 27 23   BUN 10.7 9.4*   CREATININE 0.69 0.65   CALCIUM 8.9 8.7   ALBUMIN 2.5* 2.4*   BILITOT 0.7 0.9   ALKPHOS 60 52   AST 26 24   ALT 14 14    and CBC   Recent Labs   Lab 05/13/24  0408 05/14/24  0336   WBC 5.62 5.74   HGB 11.0* 10.9*   HCT 33.7* 32.5*    147       Pending Diagnostic Studies:       None            No new Assessment & Plan notes have been filed under this hospital service since the last note was generated.  Service: Cardiovascular Surgery    Final Active Diagnoses:    Diagnosis Date Noted POA    Atherosclerosis of native coronary artery of native heart without angina pectoris [I25.10] 05/09/2024 Unknown  "     Problems Resolved During this Admission:      Discharged Condition: good    Disposition: [place] [facility] [place]    Follow Up:   Follow-up Information       [facility] Follow up.    Specialties: Home Health Services, Home Therapy Services, Home Living Aide Services  Contact information:  Reji Powers  [facility] [id number] [phone]                         Patient Instructions:      WALKER FOR HOME USE   Order Comments: Dx: I25.10, Z95.1 post CABG     Order Specific Question Answer Comments   Type of Walker: Adult (5'4"-6'6")    With wheels? Yes    Height: 5' 3" (1.6 m)    Weight: 82.6 kg (182 lb 1.6 oz)    Length of need (1-99 months): 99    Does patient have medical equipment at home? none    Please check all that apply: Patient's condition impairs ambulation.    Please check all that apply: Patient needs help to get in and out of chair.    Please check all that apply: Patient is unable to safely ambulate without equipment.      SUBSEQUENT HOME HEALTH ORDERS   Order Comments: Home Health evaluate and treat  Skilled nurse for management of disease process, medication management, and education.  PT evaluate and treat - for ambulation, balance, strengthening, and safety assessment.     Order Specific Question Answer Comments   What Home Health Agency is the patient currently using? Other/External      Medications:  Reconciled Home Medications:      Medication List        START taking these medications      aspirin 81 MG EC tablet  Commonly known as: ECOTRIN  Take 1 tablet (81 mg total) by mouth once daily.     HYDROcodone-acetaminophen 5-325 mg per tablet  Commonly known as: NORCO  Take 1 tablet by mouth every 6 (six) hours as needed for Pain.     metoprolol tartrate 25 MG tablet  Commonly known as: LOPRESSOR  Take 1 tablet (25 mg total) by mouth 2 (two) times daily.            CONTINUE taking these medications      clopidogreL 75 mg tablet  Commonly known as: PLAVIX  Take 75 mg by mouth once " daily.     rosuvastatin 40 MG Tab  Commonly known as: CRESTOR  Take 40 mg by mouth once daily.            STOP taking these medications      lisinopriL 10 MG tablet            Time spent on the discharge of patient: 30 minutes    TYLER Powell  Cardiothoracic Surgery  [facility] - [facility] ICU

## 2024-05-14 NOTE — PROGRESS NOTES
05/14/24 0920   Pre Exercise Vitals   /86   Pulse 86   Supplemental O2? No   SpO2 96 %   During Exercise Vitals   Pulse 101   Supplemental O2? No   SpO2 97 %   Distance Walked 125 feet   Post Exercise Vitals   /72   Pulse 87   Supplemental O2? No   SpO2 96 %   Modality   Modality   (rollator)     Communicated with nurse pre and post walk. Applied bilateral  knee high teds. Standby assist from sitting to standing. Sternal precautions maintained. Gait steady, slow with rollator. Tolerated well. Assisted back to chair.  Performed incentive spirometer. Max 1000. Reinforced postop instructions. Plans dc home today.

## 2024-05-15 ENCOUNTER — PATIENT OUTREACH (OUTPATIENT)
Dept: ADMINISTRATIVE | Facility: CLINIC | Age: 69
End: 2024-05-15
Payer: MEDICARE

## 2024-05-15 NOTE — PROGRESS NOTES
C3 nurse attempted to contact Tanviwarner Miranda  for a TCC post hospital discharge follow up call. No answer. Left voicemail with callback information. The patient has a scheduled HOSFU appointment with Dr. Doe on 05/23/2024 @ 1045 am.   Appointment with Dr. Talamantes on 06/12/2024 @ 820 am.

## 2024-05-16 NOTE — PROGRESS NOTES
C3 nurse spoke with Tanvi Miranda  for a TCC post hospital discharge follow up call. The patient has a scheduled HOSFU appointment with Dr. Doe on 05/23/2024 @ 1045 am.   Appointment with Dr. Talamantes on 06/12/2024 @ 820 am.

## 2024-05-29 DIAGNOSIS — G89.18 POST-OP PAIN: ICD-10-CM

## 2024-05-29 DIAGNOSIS — I25.10 ATHEROSCLEROSIS OF NATIVE CORONARY ARTERY OF NATIVE HEART WITHOUT ANGINA PECTORIS: Primary | ICD-10-CM

## 2024-05-29 RX ORDER — HYDROCODONE BITARTRATE AND ACETAMINOPHEN 5; 325 MG/1; MG/1
1 TABLET ORAL EVERY 6 HOURS PRN
Qty: 28 TABLET | Refills: 0 | OUTPATIENT
Start: 2024-05-29 | End: 2024-06-09

## 2024-06-06 ENCOUNTER — TELEPHONE (OUTPATIENT)
Dept: CARDIAC SURGERY | Facility: CLINIC | Age: 69
End: 2024-06-06
Payer: MEDICARE

## 2024-06-06 NOTE — TELEPHONE ENCOUNTER
Pt refused therapy in the beginning and asking if they can ask and initiate therapy at this point, orders given.  Inst to continue to monitor pain and call back if condition worsens.  Verb understanding.   ----- Message from Alice Beasley sent at 6/6/2024 11:05 AM CDT -----  Debra with [facility] call to give update on patient, she is complaining about Left knee pain since yesterday, surgical incision is healing does has a scab no redness, no sign of DVT    Call back # [phone]

## 2024-06-09 ENCOUNTER — HOSPITAL ENCOUNTER (EMERGENCY)
Facility: HOSPITAL | Age: 69
Discharge: HOME | End: 2024-06-09
Attending: EMERGENCY MEDICINE
Payer: MEDICARE

## 2024-06-09 VITALS
HEIGHT: 63 IN | SYSTOLIC BLOOD PRESSURE: 123 MMHG | OXYGEN SATURATION: 98 % | HEART RATE: 80 BPM | DIASTOLIC BLOOD PRESSURE: 71 MMHG | TEMPERATURE: 99 F | BODY MASS INDEX: 30.65 KG/M2 | RESPIRATION RATE: 20 BRPM | WEIGHT: 173 LBS

## 2024-06-09 DIAGNOSIS — G89.18 POST-OP PAIN: ICD-10-CM

## 2024-06-09 DIAGNOSIS — M79.605 ACUTE LEG PAIN, LEFT: ICD-10-CM

## 2024-06-09 DIAGNOSIS — M79.89 LEFT LEG SWELLING: Primary | ICD-10-CM

## 2024-06-09 LAB
ALBUMIN SERPL-MCNC: 2.9 G/DL (ref 3.4–4.8)
ALBUMIN/GLOB SERPL: 0.9 RATIO (ref 1.1–2)
ALP SERPL-CCNC: 80 UNIT/L (ref 40–150)
ALT SERPL-CCNC: 13 UNIT/L (ref 0–55)
ANION GAP SERPL CALC-SCNC: 13 MEQ/L
AST SERPL-CCNC: 25 UNIT/L (ref 5–34)
BASOPHILS # BLD AUTO: 0.02 X10(3)/MCL
BASOPHILS NFR BLD AUTO: 0.3 %
BILIRUB SERPL-MCNC: 0.7 MG/DL
BUN SERPL-MCNC: 9.6 MG/DL (ref 9.8–20.1)
CALCIUM SERPL-MCNC: 9.7 MG/DL (ref 8.4–10.2)
CHLORIDE SERPL-SCNC: 107 MMOL/L (ref 98–107)
CO2 SERPL-SCNC: 20 MMOL/L (ref 23–31)
CREAT SERPL-MCNC: 0.73 MG/DL (ref 0.55–1.02)
CREAT/UREA NIT SERPL: 13
EOSINOPHIL # BLD AUTO: 0.01 X10(3)/MCL (ref 0–0.9)
EOSINOPHIL NFR BLD AUTO: 0.1 %
ERYTHROCYTE [DISTWIDTH] IN BLOOD BY AUTOMATED COUNT: 14.4 % (ref 11.5–17)
GFR SERPLBLD CREATININE-BSD FMLA CKD-EPI: >60 ML/MIN/1.73/M2
GLOBULIN SER-MCNC: 3.1 GM/DL (ref 2.4–3.5)
GLUCOSE SERPL-MCNC: 103 MG/DL (ref 82–115)
HCT VFR BLD AUTO: 33.5 % (ref 37–47)
HGB BLD-MCNC: 10.9 G/DL (ref 12–16)
IMM GRANULOCYTES # BLD AUTO: 0.02 X10(3)/MCL (ref 0–0.04)
IMM GRANULOCYTES NFR BLD AUTO: 0.3 %
LYMPHOCYTES # BLD AUTO: 0.71 X10(3)/MCL (ref 0.6–4.6)
LYMPHOCYTES NFR BLD AUTO: 10 %
MCH RBC QN AUTO: 28.5 PG (ref 27–31)
MCHC RBC AUTO-ENTMCNC: 32.5 G/DL (ref 33–36)
MCV RBC AUTO: 87.7 FL (ref 80–94)
MONOCYTES # BLD AUTO: 0.85 X10(3)/MCL (ref 0.1–1.3)
MONOCYTES NFR BLD AUTO: 12 %
NEUTROPHILS # BLD AUTO: 5.48 X10(3)/MCL (ref 2.1–9.2)
NEUTROPHILS NFR BLD AUTO: 77.3 %
NRBC BLD AUTO-RTO: 0 %
PLATELET # BLD AUTO: 202 X10(3)/MCL (ref 130–400)
PMV BLD AUTO: 10.5 FL (ref 7.4–10.4)
POTASSIUM SERPL-SCNC: 3.8 MMOL/L (ref 3.5–5.1)
PROT SERPL-MCNC: 6 GM/DL (ref 5.8–7.6)
RBC # BLD AUTO: 3.82 X10(6)/MCL (ref 4.2–5.4)
SODIUM SERPL-SCNC: 140 MMOL/L (ref 136–145)
WBC # SPEC AUTO: 7.09 X10(3)/MCL (ref 4.5–11.5)

## 2024-06-09 PROCEDURE — 25000003 PHARM REV CODE 250: Performed by: EMERGENCY MEDICINE

## 2024-06-09 PROCEDURE — 85025 COMPLETE CBC W/AUTO DIFF WBC: CPT | Performed by: EMERGENCY MEDICINE

## 2024-06-09 PROCEDURE — 99284 EMERGENCY DEPT VISIT MOD MDM: CPT | Mod: 25

## 2024-06-09 PROCEDURE — 80053 COMPREHEN METABOLIC PANEL: CPT | Performed by: EMERGENCY MEDICINE

## 2024-06-09 RX ORDER — FUROSEMIDE 20 MG/1
20 TABLET ORAL DAILY PRN
COMMUNITY

## 2024-06-09 RX ORDER — HYDROCODONE BITARTRATE AND ACETAMINOPHEN 5; 325 MG/1; MG/1
1 TABLET ORAL EVERY 6 HOURS PRN
Qty: 12 TABLET | Refills: 0 | Status: SHIPPED | OUTPATIENT
Start: 2024-06-09

## 2024-06-09 RX ORDER — HYDROCODONE BITARTRATE AND ACETAMINOPHEN 5; 325 MG/1; MG/1
1 TABLET ORAL
Status: COMPLETED | OUTPATIENT
Start: 2024-06-09 | End: 2024-06-09

## 2024-06-09 RX ADMIN — HYDROCODONE BITARTRATE AND ACETAMINOPHEN 1 TABLET: 5; 325 TABLET ORAL at 08:06

## 2024-06-09 NOTE — ED PROVIDER NOTES
Encounter Date: 6/9/2024    SCRIBE #1 NOTE: I, Mio Holland, am scribing for, and in the presence of,  Dr. Smith. I have scribed the following portions of the note - Other sections scribed: HPI, ROS, Physical Exam, MDM, Attending.       History     Chief Complaint   Patient presents with    Back Pain     Lower back pain and left knee pain and swelling x 6 days, h/o CABG on 05/09 with Dr. honeycutt     69 y/o female with history of HTN, HLD and CAD s/p CABG on 5/9 with Dr. Talamantes presents to ED for L knee, hip and lower back pain onset about 4 days ago.  Pt also reports notable swelling to L knee.  Pt had endoscopic procurement of vein via incision at L medial knee per chart review.    The history is provided by the patient.   Back Pain   This is a new problem. Illness onset: 4 days ago. The problem occurs constantly. The problem has been unchanged. Associated symptoms include leg pain.     Review of patient's allergies indicates:  No Known Allergies  Past Medical History:   Diagnosis Date    Coronary artery disease     HLD (hyperlipidemia)     Hypertension      Past Surgical History:   Procedure Laterality Date    CLOSURE OF LEFT ATRIAL APPENDAGE USING DEVICE N/A 5/9/2024    Procedure: Left atrial appendage closure device;  Surgeon: Nehemias Talamantes MD;  Location: [facility];  Service: Cardiology;  Laterality: N/A;    CORONARY ARTERY BYPASS GRAFT (CABG) N/A 5/9/2024    Procedure: CORONARY ARTERY BYPASS GRAFT (CABG);  Surgeon: Nehemias Talamantes MD;  Location: [facility] OR;  Service: Cardiothoracic;  Laterality: N/A;  ECHO NOTIFIED    ENDOSCOPIC HARVEST OF VEIN N/A 5/9/2024    Procedure: SURGICAL PROCUREMENT, VEIN, ENDOSCOPIC;  Surgeon: Nehemias Talamantes MD;  Location: [facility] OR;  Service: Cardiothoracic;  Laterality: N/A;    LEFT HEART CATHETERIZATION Left 5/6/2024    Procedure: Left heart cath;  Surgeon: Celso Watts Jr., MD;  Location: [facility] CATH LAB;  Service: Cardiology;  Laterality: Left;     No family history on  file.  Social History     Tobacco Use    Smoking status: Never    Smokeless tobacco: Never     Review of Systems   Musculoskeletal:  Positive for arthralgias (L knee), back pain and joint swelling (L knee).       Physical Exam     Initial Vitals [06/09/24 0739]   BP Pulse Resp Temp SpO2   133/81 107 20 98.9 °F (37.2 °C) 97 %      MAP       --         Physical Exam    Nursing note and vitals reviewed.  Constitutional: She appears well-developed and well-nourished. She is not diaphoretic. She does not appear ill. No distress.   HENT:   Head: Normocephalic and atraumatic.   Right Ear: External ear normal.   Left Ear: External ear normal.   Mouth/Throat: Oropharynx is clear and moist.   Eyes: Conjunctivae are normal.   Neck: Neck supple. No tracheal deviation present.   Cardiovascular:  Normal rate, regular rhythm and normal heart sounds.           No murmur heard.  Pulmonary/Chest: Breath sounds normal. No respiratory distress. She has no wheezes. She has no rhonchi. She has no rales.   Sternotomy scar well-healing with dressing in place    Abdominal: Abdomen is soft. She exhibits no distension. There is no abdominal tenderness.   No right CVA tenderness.  No left CVA tenderness.   Musculoskeletal:      Cervical back: Neck supple.      Comments: Entire L leg swollen      Neurological: She is alert and oriented to person, place, and time. GCS score is 15. GCS eye subscore is 4. GCS verbal subscore is 5. GCS motor subscore is 6.   Skin: Skin is warm and dry. Capillary refill takes less than 2 seconds. No rash noted. No pallor.   Well-healing incision to L medial knee         ED Course   Procedures  Labs Reviewed   COMPREHENSIVE METABOLIC PANEL - Abnormal; Notable for the following components:       Result Value    CO2 20 (*)     Blood Urea Nitrogen 9.6 (*)     Albumin 2.9 (*)     Albumin/Globulin Ratio 0.9 (*)     All other components within normal limits   CBC WITH DIFFERENTIAL - Abnormal; Notable for the following  components:    RBC 3.82 (*)     Hgb 10.9 (*)     Hct 33.5 (*)     MCHC 32.5 (*)     MPV 10.5 (*)     All other components within normal limits   CBC W/ AUTO DIFFERENTIAL    Narrative:     The following orders were created for panel order CBC auto differential.  Procedure                               Abnormality         Status                     ---------                               -----------         ------                     CBC with Differential[[id number]]       Abnormal            Final result                 Please view results for these tests on the individual orders.          Imaging Results    None          Medications   HYDROcodone-acetaminophen 5-325 mg per tablet 1 tablet (1 tablet Oral Given 6/9/24 0838)     Medical Decision Making  Differential diagnoses include, but are not limited to:  DVT, anemia, renal failure, kidney failure, postoperative pain, dependent edema, congestive heart failure    Problems Addressed:  Acute leg pain, left: acute illness or injury that poses a threat to life or bodily functions  Left leg swelling: acute illness or injury that poses a threat to life or bodily functions  Post-op pain: acute illness or injury that poses a threat to life or bodily functions    Amount and/or Complexity of Data Reviewed  External Data Reviewed: notes.     Details: Chart review  Patient admitted on 05/05/2024 and had a CABG on 05/09, was discharged on 05/14  Labs: ordered. Decision-making details documented in ED Course.  Radiology: ordered.     Details: US: There was no evidence of deep or superficial vein thrombosis in the left lower extremity.     Risk  Prescription drug management.            Scribe Attestation:   Scribe #1: I performed the above scribed service and the documentation accurately describes the services I performed. I attest to the accuracy of the note.    Attending Attestation:           Physician Attestation for Scribe:  Physician Attestation Statement for Scribe #1: I,  reviewed documentation, as scribed by Mio Holland in my presence, and it is both accurate and complete.             ED Course as of 06/09/24 1022   Sun Jun 09, 2024   0932 Ultrasound was negative for DVT [KM]   0943 Pain is improved, awaiting labs.  [KM]   1003 Hemoglobin(!): 10.9  At baseline [KM]   1007 Spoke with Dr Woodruff (CT surgery) regarding patient's ED visit due to the fact she had CABG on 5/9  [KM]   1016 Discussed lab results with patient.  She was agreeable with plan to be discharged on pain medication.  She understands she should wear her compression stocking to help with the swelling.  She was starting physical therapy this week. [KM]      ED Course User Index  [KM] Anna Smith MD                             Clinical Impression:  Final diagnoses:  [M79.89] Left leg swelling (Primary)  [M79.605] Acute leg pain, left  [G89.18] Post-op pain          ED Disposition Condition    Discharge Stable          ED Prescriptions       Medication Sig Dispense Start Date End Date Auth. Provider    HYDROcodone-acetaminophen (NORCO) 5-325 mg per tablet Take 1 tablet by mouth every 6 (six) hours as needed for Pain. 12 tablet 6/9/2024 -- Anna Smith MD          Follow-up Information       Follow up With Specialties Details Why Contact Info    Nehemias Talamantes MD Cardiothoracic Surgery, Cardiology Go on 6/12/2024 at 8:20 AM for follow up [facility] [address] [place] [address] [address]  [facility] [address]  [phone]      [facility] - Emergency Dept Emergency Medicine  As needed, If symptoms worsen [facility] [facility] [id number] [phone]             Anna Smith MD  06/09/24 1022

## 2024-06-11 NOTE — PHYSICIAN QUERY
Due to the conflicting clinical picture, please clinically validate the Acute Respiratory Failure.  The respiratory condition has been ruled out

## 2024-06-12 ENCOUNTER — OFFICE VISIT (OUTPATIENT)
Dept: CARDIAC SURGERY | Facility: CLINIC | Age: 69
End: 2024-06-12
Payer: MEDICARE

## 2024-06-12 VITALS
WEIGHT: 164 LBS | BODY MASS INDEX: 29.06 KG/M2 | DIASTOLIC BLOOD PRESSURE: 83 MMHG | HEIGHT: 63 IN | HEART RATE: 103 BPM | SYSTOLIC BLOOD PRESSURE: 130 MMHG | OXYGEN SATURATION: 97 %

## 2024-06-12 DIAGNOSIS — I25.10 ATHEROSCLEROSIS OF NATIVE CORONARY ARTERY OF NATIVE HEART WITHOUT ANGINA PECTORIS: Primary | ICD-10-CM

## 2024-06-12 PROCEDURE — 3075F SYST BP GE 130 - 139MM HG: CPT | Mod: CPTII,,, | Performed by: THORACIC SURGERY (CARDIOTHORACIC VASCULAR SURGERY)

## 2024-06-12 PROCEDURE — 1159F MED LIST DOCD IN RCRD: CPT | Mod: CPTII,,, | Performed by: THORACIC SURGERY (CARDIOTHORACIC VASCULAR SURGERY)

## 2024-06-12 PROCEDURE — 1125F AMNT PAIN NOTED PAIN PRSNT: CPT | Mod: CPTII,,, | Performed by: THORACIC SURGERY (CARDIOTHORACIC VASCULAR SURGERY)

## 2024-06-12 PROCEDURE — 1160F RVW MEDS BY RX/DR IN RCRD: CPT | Mod: CPTII,,, | Performed by: THORACIC SURGERY (CARDIOTHORACIC VASCULAR SURGERY)

## 2024-06-12 PROCEDURE — 1101F PT FALLS ASSESS-DOCD LE1/YR: CPT | Mod: CPTII,,, | Performed by: THORACIC SURGERY (CARDIOTHORACIC VASCULAR SURGERY)

## 2024-06-12 PROCEDURE — 4010F ACE/ARB THERAPY RXD/TAKEN: CPT | Mod: CPTII,,, | Performed by: THORACIC SURGERY (CARDIOTHORACIC VASCULAR SURGERY)

## 2024-06-12 PROCEDURE — 99024 POSTOP FOLLOW-UP VISIT: CPT | Mod: ,,, | Performed by: THORACIC SURGERY (CARDIOTHORACIC VASCULAR SURGERY)

## 2024-06-12 PROCEDURE — 3079F DIAST BP 80-89 MM HG: CPT | Mod: CPTII,,, | Performed by: THORACIC SURGERY (CARDIOTHORACIC VASCULAR SURGERY)

## 2024-06-12 PROCEDURE — 3288F FALL RISK ASSESSMENT DOCD: CPT | Mod: CPTII,,, | Performed by: THORACIC SURGERY (CARDIOTHORACIC VASCULAR SURGERY)

## 2024-06-12 NOTE — PROGRESS NOTES
"Tanvi Miranda is a 68 y.o. female patient.   No diagnosis found.  Past Medical History:   Diagnosis Date    Coronary artery disease     HLD (hyperlipidemia)     Hypertension      No past surgical history pertinent negatives on file.  Scheduled Meds:  Continuous Infusions:  PRN Meds:    Review of patient's allergies indicates:  No Known Allergies  There are no hospital problems to display for this patient.    Blood pressure 130/83, pulse 103, height 5' 3" (1.6 m), weight 74.4 kg (164 lb), SpO2 97%.    Subjective:  The patient is doing very well status post coronary artery bypass grafting.  Apparently had some swelling in the left lower extremity and visited the ER where an ultrasound was negative.      Objective:  Overall doing very well.  Sternum is stable.  Left lower extremity with minimal swelling.      Assessment & Plan:  The patient is doing very well after coronary artery bypass grafting.  I did recommend cardiac rehab.  She has not ready yet.  I told her to re-evaluate in the couple of weeks.  RTC p.rguillermo Talamantes MD  6/12/2024    "

## 2024-06-14 ENCOUNTER — EXTERNAL HOME HEALTH (OUTPATIENT)
Dept: HOME HEALTH SERVICES | Facility: HOSPITAL | Age: 69
End: 2024-06-14
Payer: MEDICARE

## 2024-06-21 NOTE — ADDENDUM NOTE
Addendum  created 06/21/24 0847 by Vazquez Suarez MD    Attestation recorded in Intraprocedure, Intraprocedure Attestations filed

## 2024-07-10 ENCOUNTER — DOCUMENT SCAN (OUTPATIENT)
Dept: HOME HEALTH SERVICES | Facility: HOSPITAL | Age: 69
End: 2024-07-10
Payer: MEDICARE

## 2024-07-15 ENCOUNTER — TELEPHONE (OUTPATIENT)
Dept: CARDIAC SURGERY | Facility: CLINIC | Age: 69
End: 2024-07-15
Payer: MEDICARE

## 2024-07-15 NOTE — TELEPHONE ENCOUNTER
Pt surgery was over 2 months ago and inst she shouldn't need narcotic pain meds at this point and try otc  meds, she c/o headache after laying down at night.  She is going to check with pcp, and check bp to see if elevated.  States she was taking Tylenol PM at night.----- Message from Miguel Downing sent at 7/15/2024  3:24 PM CDT -----  Regarding: pain med refill  Contact: # [phone]  Pharmacy [facility] in [place]  Needs something for pain

## 2024-07-24 ENCOUNTER — DOCUMENT SCAN (OUTPATIENT)
Dept: HOME HEALTH SERVICES | Facility: HOSPITAL | Age: 69
End: 2024-07-24
Payer: MEDICARE

## 2024-08-01 ENCOUNTER — TELEPHONE (OUTPATIENT)
Dept: CARDIAC SURGERY | Facility: CLINIC | Age: 69
End: 2024-08-01
Payer: MEDICARE

## 2024-08-01 NOTE — TELEPHONE ENCOUNTER
Letter faxed as requested.   ----- Message from Alice Beasley sent at 8/1/2024  8:39 AM CDT -----  Patient called asking  for a return to work, Work Release, she wants it mailed out to her and fax to her company. Wants to return back to work August 19, 2024. Please call and reach to patient [phone]    [employer] Fax#[phone]

## (undated) DEVICE — GLOVE PROTEXIS PI SYN SURG 7.5

## (undated) DEVICE — COVER PROBE US 5.5X58L NON LTX

## (undated) DEVICE — Device

## (undated) DEVICE — CARTRIDGE HEPARIN DOSE

## (undated) DEVICE — SUT PROLENE 7-0 BV175-6

## (undated) DEVICE — SUT SILK 2-0 BLK BR KS 30 I

## (undated) DEVICE — KIT SURGICAL TURNOVER

## (undated) DEVICE — SET ANGIO ACIST CVI ANGIOTOUCH

## (undated) DEVICE — GLOVE COTTON KNITTED CUFF

## (undated) DEVICE — DRAIN CHEST DRY SUCTION

## (undated) DEVICE — DRAPE SLUSH WARMER WITH DISC

## (undated) DEVICE — BLADE SCALP OPHTL BEVEL STR

## (undated) DEVICE — PUNCH AORTIC ROT TIP 4.0MM

## (undated) DEVICE — BAG MEDI-PLAST DECANTER C-FLOW

## (undated) DEVICE — SOL NORMAL USPCA 0.9%

## (undated) DEVICE — SYR W NDL BLN FILL 5ML 18GX1.5

## (undated) DEVICE — SUT PROLENE 5/0 RB-1 36 IN

## (undated) DEVICE — SPONGE GAUZE 16PLY 4X4

## (undated) DEVICE — GLOVE PROTEXIS BLUE LATEX 7

## (undated) DEVICE — SUT VICRYL 1 OB 36 CTX

## (undated) DEVICE — SYR SLIP TIP 20CC

## (undated) DEVICE — SOL LAC RINGERS 1000ML INJ

## (undated) DEVICE — TRAY CATH FOL SIL TEMP 10 16FR

## (undated) DEVICE — GLOVE PROTEXIS HYDROGEL SZ6.5

## (undated) DEVICE — VALVE CONTROL COPILOT

## (undated) DEVICE — PAD DEFIB CADENCE ADULT R2

## (undated) DEVICE — TUBE SUCTION 6.5 TIP 6FR

## (undated) DEVICE — INSERT INTRACK CLAMP ULTRA 88M

## (undated) DEVICE — SPONGE LAP 18X18 PREWASHED

## (undated) DEVICE — NDL ASPIRATOR AIR 16G

## (undated) DEVICE — TUBING INSUFFLATOR W/ROT CONCT

## (undated) DEVICE — CANNULA NON-VENT 24FR 14.5IN

## (undated) DEVICE — CATH THOR STND RGHT ANG 28F

## (undated) DEVICE — DRESSING TEGADERM CHG 3.5X4.5

## (undated) DEVICE — SYS CLSR DERMABOND PRINEO 22CM

## (undated) DEVICE — INSERT INTRACK CLAMP ULT 66MM

## (undated) DEVICE — APPLIER LIGACLIP SM 9.38IN

## (undated) DEVICE — STOPCOCK 4-WAY

## (undated) DEVICE — SUT MONOCRYL PLUS UD 3-0 27

## (undated) DEVICE — BAND TR COMP DEVICE REG 24CM

## (undated) DEVICE — KIT VAVD

## (undated) DEVICE — CANNULA ADULT NASAL 7FT

## (undated) DEVICE — BLANKET HYPER ADULT 24X60IN

## (undated) DEVICE — SOL .9NACL PF 100 ML

## (undated) DEVICE — GUIDEWIRE OMNI STR TIP 185CM

## (undated) DEVICE — CATH THORACIC 28FR ST

## (undated) DEVICE — PAD DEFIB RADIOTRANSPARENT

## (undated) DEVICE — KIT CATHGARD DBL LUMN 9FRX11.5

## (undated) DEVICE — SOL ELECTROLYTE PH 7.4 500ML

## (undated) DEVICE — SYS VIRTUOSAPH PLUS EVM

## (undated) DEVICE — SYR 10CC LUER LOCK

## (undated) DEVICE — GUIDE LAUNCHER 6FR EBU 3.5

## (undated) DEVICE — SOL PLASMALYTE PH 7.4 1000ML

## (undated) DEVICE — KIT GLIDESHEATH SLEND 6FR 10CM

## (undated) DEVICE — DRESSING TELFA + RECT 6X10IN

## (undated) DEVICE — SOL NACL IRR 3000ML

## (undated) DEVICE — CATH OPTITORQUE RADIAL 5FR

## (undated) DEVICE — KIT C.A.T.S. FAST START

## (undated) DEVICE — CANNULA MC2 OVAL NVENT 32/40FR

## (undated) DEVICE — CARTRIDGE SILV 4CHAN 2.0-3.5MG

## (undated) DEVICE — DRESSING TELFA + BARR 4X6IN

## (undated) DEVICE — HOLDER STRIP-T SELF ADH 2X10IN

## (undated) DEVICE — GLOVE PROTEXIS NEOPRN SZ8

## (undated) DEVICE — SENSOR LOW LEVEL OXYGEN

## (undated) DEVICE — SUT 2/0 36IN ETHIBOND EXCE

## (undated) DEVICE — GLOVE BIOGEL 7.5

## (undated) DEVICE — BOWL STERILE LG GRAD 32OZ

## (undated) DEVICE — SUT ETHBND XTRA 1 OS-8 30IN

## (undated) DEVICE — HEMOSTAT SURGICEL FIBRLR 2X4IN

## (undated) DEVICE — PENCIL ELECSURG ROCKER 15FT

## (undated) DEVICE — GOWN SMARTSLEEVE AAMI LVL4 XXL

## (undated) DEVICE — CARTRIDGE HEPARIN 2 CHNNL ACT

## (undated) DEVICE — PACK SURG PERF CARDPULM BYPS

## (undated) DEVICE — HEMOCONCENTRATOR W TBNG ADPT

## (undated) DEVICE — GUIDEWIRE INQWIRE SE 3MM JTIP

## (undated) DEVICE — SUT 2 30IN SILK BLK BRAIDE